# Patient Record
Sex: FEMALE | Race: WHITE | URBAN - METROPOLITAN AREA
[De-identification: names, ages, dates, MRNs, and addresses within clinical notes are randomized per-mention and may not be internally consistent; named-entity substitution may affect disease eponyms.]

---

## 2017-01-05 ENCOUNTER — CARE COORDINATION (OUTPATIENT)
Dept: CARDIOLOGY | Facility: CLINIC | Age: 72
End: 2017-01-05

## 2017-01-05 DIAGNOSIS — Z94.0 KIDNEY REPLACED BY TRANSPLANT: Primary | ICD-10-CM

## 2017-01-05 DIAGNOSIS — R79.89 ELEVATED SERUM CREATININE: ICD-10-CM

## 2017-01-05 NOTE — NURSING NOTE
Additional labs per Dr. Rosalba Cope, N  Nephrology  [facility]  [phone]

## 2017-01-06 ENCOUNTER — TELEPHONE (OUTPATIENT)
Dept: TRANSPLANT | Facility: CLINIC | Age: 72
End: 2017-01-06

## 2017-01-06 NOTE — TELEPHONE ENCOUNTER
Prior Authorization Specialty Medication Request    Medication/Dose: Mycophenolic acid 360 mg  Frequency: BID    Route: oral  Diagnosis and ICD: Kidney Transplant Z94.0  New/Renewal/Insurance Change PA:     Important Lab Values:     Previously Tried and Failed Therapies:     Rationale:     Would you like to include any research articles?    If yes please include the hyperlink(s) below or fax @ [phone].    (Include Name and MRN)    If you received a fax notification from an outside Pharmacy;  Pharmacy Name:[facility]  Pharmacy #:[phone]  Pharmacy Fax:[phone]

## 2017-01-10 ENCOUNTER — TELEPHONE (OUTPATIENT)
Dept: NEPHROLOGY | Facility: CLINIC | Age: 72
End: 2017-01-10

## 2017-01-10 NOTE — Clinical Note
January 10, 2017    OUTPATIENT LABORATORY TEST ORDER    Patient Name: Dixie Cunha               Transplant Date: 8/26/2003   YOB: 1945                              Issue Date & Time:1/10/2017  9:48 AM    [facility] MR: [id number]                               Exp. Date (1 year after date issued)    Diagnoses: Aftercare of Organ Transplant (ICD-10 Z48.288)   Kidney Transplant (ICD-10 Z94.0)   Long term use of medications (ICD-10 Z79.899)     Lab results to be available on the same day drawn.   Patient should release information to the [facility] Transplant [facility].  Please fax to the Transplant Center at [phone].    Every 3 months:   ?CBC and Platelet   ?Basic Metabolic Panel           ?12 hour Mycophenolic acid  level          ?12 hour tacrolimus drug level    Every 6 months:          ?Random Urine for protein/creatinine ratio          ?Liver Function Tests           ?Fasting Lipid Panel      If you have any questions, please call The Transplant Center at [phone] .    Please fax labs to [phone]    .

## 2017-01-10 NOTE — Clinical Note
OUTPATIENT LABORATORY TEST ORDER    Patient Name: Dixie Cunha               Transplant Date: 8/26/2003   YOB: 1945                              Issue Date & Time:1/10/2017  9:48 AM    [facility] MR: [id number]                               Exp. Date (1 year after date issued)    Diagnoses: Aftercare of Organ Transplant (ICD-10 Z48.288)   Kidney Transplant (ICD-10 Z94.0)   Long term use of medications (ICD-10 Z79.899)     Lab results to be available on the same day drawn.   Patient should release information to the [facility].  Please fax to the Transplant Center at [phone].    Every 3 months:   ?CBC and Platelet   ?Basic Metabolic Panel           ?12 hour Mycophenolic acid  level          ?12 hour tacrolimus drug level    Every 6 months:          ?Random Urine for protein/creatinine ratio          ?Liver Function Tests           ?Fasting Lipid Panel      If you have any questions, please call The Transplant Center at [phone] .    Please fax labs to [phone]    .

## 2017-01-11 DIAGNOSIS — Z48.298 AFTERCARE FOLLOWING ORGAN TRANSPLANT: ICD-10-CM

## 2017-01-11 DIAGNOSIS — Z94.0 KIDNEY REPLACED BY TRANSPLANT: ICD-10-CM

## 2017-01-11 DIAGNOSIS — Z79.899 ENCOUNTER FOR LONG-TERM CURRENT USE OF MEDICATION: ICD-10-CM

## 2017-01-11 PROCEDURE — 80197 ASSAY OF TACROLIMUS: CPT | Performed by: INTERNAL MEDICINE

## 2017-01-13 LAB
MYCOPHENOLATE SERPL LC/MS/MS-MCNC: NORMAL MG/L (ref 1–3.5)
MYCOPHENOLATE-G SERPL LC/MS/MS-MCNC: NORMAL MG/L (ref 30–95)
TACROLIMUS BLD-MCNC: 8.9 UG/L (ref 5–15)
TME LAST DOSE: NORMAL H
TME LAST DOSE: NORMAL H

## 2017-01-16 ENCOUNTER — DOCUMENTATION ONLY (OUTPATIENT)
Dept: TRANSPLANT | Facility: CLINIC | Age: 72
End: 2017-01-16

## 2017-01-16 ENCOUNTER — TELEPHONE (OUTPATIENT)
Dept: TRANSPLANT | Facility: CLINIC | Age: 72
End: 2017-01-16

## 2017-01-16 DIAGNOSIS — Z94.0 KIDNEY TRANSPLANTED: Primary | ICD-10-CM

## 2017-01-16 DIAGNOSIS — Z94.0 IMMUNOSUPPRESSIVE MANAGEMENT ENCOUNTER FOLLOWING KIDNEY TRANSPLANT: ICD-10-CM

## 2017-01-16 DIAGNOSIS — Z79.899 IMMUNOSUPPRESSIVE MANAGEMENT ENCOUNTER FOLLOWING KIDNEY TRANSPLANT: ICD-10-CM

## 2017-01-16 DIAGNOSIS — Z48.298 AFTERCARE FOLLOWING ORGAN TRANSPLANT: ICD-10-CM

## 2017-01-16 DIAGNOSIS — T86.10 COMPLICATIONS, KIDNEY TRANSPLANT: ICD-10-CM

## 2017-01-16 NOTE — TELEPHONE ENCOUNTER
ISSUE: Serum Potassium = 5.7    Recommend eating LESS of Potassium rich food.    No answer.  Left a voicemail message of which fruits and vegetables to avoid.

## 2017-01-16 NOTE — PROGRESS NOTES
Notes Recorded by Jean Pierre Navarrete MD on 1/13/2017 at 6:17 PM  Can you please make sure her labs are arranged for   She needs to check MPA level and tacrolimus in addition to renal panels    Added lab orders in Epic.  External lab orders already have MPA levels.

## 2017-01-17 ENCOUNTER — OFFICE VISIT (OUTPATIENT)
Dept: NEPHROLOGY | Facility: CLINIC | Age: 72
End: 2017-01-17
Attending: INTERNAL MEDICINE
Payer: COMMERCIAL

## 2017-01-17 VITALS
OXYGEN SATURATION: 94 % | HEIGHT: 64 IN | HEART RATE: 80 BPM | WEIGHT: 172.6 LBS | DIASTOLIC BLOOD PRESSURE: 91 MMHG | SYSTOLIC BLOOD PRESSURE: 160 MMHG | TEMPERATURE: 98.6 F | BODY MASS INDEX: 29.47 KG/M2

## 2017-01-17 DIAGNOSIS — Z94.0 KIDNEY REPLACED BY TRANSPLANT: ICD-10-CM

## 2017-01-17 DIAGNOSIS — Z48.298 AFTERCARE FOLLOWING ORGAN TRANSPLANT: ICD-10-CM

## 2017-01-17 DIAGNOSIS — E08.00 DIABETES MELLITUS DUE TO UNDERLYING CONDITION WITH HYPEROSMOLARITY WITHOUT COMA, WITH LONG-TERM CURRENT USE OF INSULIN (H): ICD-10-CM

## 2017-01-17 DIAGNOSIS — I15.1 HYPERTENSION SECONDARY TO OTHER RENAL DISORDERS: ICD-10-CM

## 2017-01-17 DIAGNOSIS — D84.9 IMMUNOSUPPRESSION (H): Primary | ICD-10-CM

## 2017-01-17 DIAGNOSIS — Z79.4 DIABETES MELLITUS DUE TO UNDERLYING CONDITION WITH HYPEROSMOLARITY WITHOUT COMA, WITH LONG-TERM CURRENT USE OF INSULIN (H): ICD-10-CM

## 2017-01-17 DIAGNOSIS — I25.10 CORONARY ARTERY DISEASE INVOLVING NATIVE CORONARY ARTERY OF NATIVE HEART WITHOUT ANGINA PECTORIS: ICD-10-CM

## 2017-01-17 PROCEDURE — 99213 OFFICE O/P EST LOW 20 MIN: CPT | Mod: ZF

## 2017-01-17 RX ORDER — TACROLIMUS 0.5 MG/1
0.5 CAPSULE ORAL 2 TIMES DAILY
Qty: 360 CAPSULE | Refills: 3 | Status: SHIPPED | OUTPATIENT
Start: 2017-01-17 | End: 2017-01-31

## 2017-01-17 ASSESSMENT — PAIN SCALES - GENERAL: PAINLEVEL: NO PAIN (0)

## 2017-01-17 NOTE — Clinical Note
1/17/2017       RE: Dixie Cunha  [address]  [place] [address]     Dear Colleague,    Thank you for referring your patient, Dixie Cunha, to the [facility] NEPHROLOGY at [facility]. Please see a copy of my visit note below.    No notes on file    Again, thank you for allowing me to participate in the care of your patient.      Sincerely,    Jean Pierre Navarrete MD

## 2017-01-17 NOTE — NURSING NOTE
"Chief Complaint   Patient presents with     RECHECK     FOLLOW UP       Initial /91 mmHg  Pulse 80  Temp(Src) 98.6  F (37  C) (Oral)  Ht 1.626 m (5' 4\")  Wt 78.291 kg (172 lb 9.6 oz)  BMI 29.61 kg/m2  SpO2 94% Estimated body mass index is 29.61 kg/(m^2) as calculated from the following:    Height as of this encounter: 1.626 m (5' 4\").    Weight as of this encounter: 78.291 kg (172 lb 9.6 oz).  BP completed using cuff size: dagoberto LAWSON CMA    "

## 2017-01-17 NOTE — Clinical Note
1/17/2017      RE: Dixie Cunha  [address]  [place] [address]       Assessment and Plan:  1. 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 Cr. Has improved significantly with the switch of rapamycin     2. Immunosuppression: Switched Rapamune to MMF will continue tacrolimus, no diarrhea thus far. I reduced the prograf dose to 0.5 mg po bid goal 4-6 ug/L     3. HTN/ volume overload: acceptable BP, weight is down to EDW and currently on diuretics PRN    4. CAD: LHC was done and showed obstructive CAD only amenable to medical intervention currently asymptomatic     5. DM: well control, Hg A1c in 6 range, following with endocrinology     6. Skin cancer: no recurrence since she was switch on Rapa, dermatology to f/u.     7. Dyslipidemia: continues with statin did not switch to crestor     Will plan to space out labs to monthly,     Assessment and plan was discussed with patient and she voiced her understanding and agreement.      Reason for Visit:  Ms. Cunha is here for kidney transplant follow up.     HPI:   Dixie Cunha is a 70 year old year old female with PMH significant for DM (well controled), complicated by CAD s/p GABG 2003, that failed ( as per patient her vein were to small), now with PHT, RHF, diabetic nephropathy s/p LDKT in 2003 from her sister, with baseline Cr was 1.4-1.5, until 8/12/16. She was found with a Cr of 3.5 during her routine f/u with transplant nephrology. Her immunosuppression has been Rapamune and Tacrolimus. She had a kidney Bx showing oxalate nephropathy, most likely secondary to infectious diarrhea ( 4-6 mo of diarrhea).  She was treated for Yersinia enteritis and her diarrhea resolved. Her Cr got better with resolving diarrhea but she had volume overload,with 15-20 pounds volume up after switch to sirolimus.   Her fluid over load was managed by  discontinuing the rapamycin and intensifying diuretics. She is currently off routine diuresis and only using it PRN   She will travel back to [place] this season          Transplant Hx:       Tx: LDKT  Date: 2003       Present Maintenance IS:Tac myfortic being tapered of mTOR     Baseline Creatinine: 1.4-1.5       Recent DSA: No         Biopsy: Yes: 9/16/16: oxalate nephropathy       Home BP: 140 /70 mmHg      ROS:   A comprehensive review of systems was obtained and negative, except as noted in the HPI or PMH.    Active Medical Problems:  Patient Active Problem List   Diagnosis     Diabetes (H)     Kidney replaced by transplant     CAD (coronary artery disease)     S/P CABG (coronary artery bypass graft)     Skin cancer     Secondary oxalosis (H)     Hypertension     Aftercare following organ transplant     Immunosuppression (H)       Personal Hx:  Social History     Social History     Marital Status:      Spouse Name: N/A     Number of Children: N/A     Years of Education: N/A     Occupational History     Not on file.     Social History Main Topics     Smoking status: Former Smoker     Smokeless tobacco: Not on file     Alcohol Use: Yes     Drug Use: No     Sexual Activity: Not on file     Other Topics Concern     Not on file     Social History Narrative       Allergies:  Allergies   Allergen Reactions     Gabapentin Swelling       Medications:  Prior to Admission medications    Medication Sig Start Date End Date Taking? Authorizing Provider   chlorthalidone (HYGROTON) 25 MG tablet Take 1 tablet (25 mg) by mouth daily 9/20/16  Yes Nydia Sosa MD   blood glucose monitoring (ONE TOUCH ULTRASOFT) lancets Dispense item covered by pt ins. E11.9 IDDM type II - Test 4 times/day. Reason: Insulin Pump 8/16/16  Yes Reported, Patient   metoclopramide (REGLAN) 10 MG tablet Take 10 mg by mouth 8/16/16  Yes Reported, Patient   acetaminophen (TYLENOL) 325 MG tablet Take 325 mg by mouth 10/3/14  Yes Reported,  "Patient   insulin lispro (HUMALOG) 100 UNIT/ML VIAL Inject 100 Units Subcutaneous 3 times daily (before meals)   Yes Reported, Patient   penicillin V (VEETID) 250 mg/5 mL suspension Take 160-800 mg by mouth 3 TAB BEFORE AND 3 TAB AFTER DENTAL PROCEDURE   Yes Reported, Patient   RAPAMUNE 0.5 MG PO TABLET Take 1 tablet (0.5 mg) by mouth daily Total dose = 1.5 mg daily  Patient taking differently: Take 1 mg by mouth daily Total dose = 1.5 mg daily 9/14/16  Yes Kareem White MD   PROGRAF 1 MG PO CAPSULE Take 1 capsule (1 mg) by mouth 2 times daily 8/29/16  Yes Kareem White MD   aspirin  MG tablet Take 325 mg by mouth 10/3/14  Yes Reported, Patient   glucagon (GLUCAGON EMERGENCY) 1 MG injection Inject 1 mg subcutaneous. As  idrected  Indications: HYPOGLYCEMIC DISORDER 10/3/14  Yes Reported, Patient   metoprolol (LOPRESSOR) 25 MG tablet Take 25 mg by mouth 11/10/14  Yes Reported, Patient   Omega-3 Fatty Acids (FISH OIL) 1000 MG CPDR Take  by mouth. One capsule twice daily.  Indications: Arteriosclerotic Heart Disease 10/3/14  Yes Reported, Patient   omeprazole (PRILOSEC) 20 MG capsule Take 20 mg by mouth 10/3/14  Yes Reported, Patient   blood glucose (ONE TOUCH ULTRA) test strip Use to test blood glucose 10 times daily 12/12/14  Yes Reported, Patient   simvastatin (ZOCOR) 20 MG tablet Take 20 mg by mouth 10/3/14  Yes Reported, Patient   sulfamethoxazole-trimethoprim (BACTRIM DS) 800-160 MG per tablet 1 tab q MWF for prophylaxis  Indications: PROPHYLAXIS 10/3/14  Yes Reported, Patient       Vitals:  /91 mmHg  Pulse 80  Temp(Src) 98.6  F (37  C) (Oral)  Ht 1.626 m (5' 4\")  Wt 78.291 kg (172 lb 9.6 oz)  BMI 29.61 kg/m2  SpO2 94%    Exam:   HENT: mouth without ulcers or lesions  LYMPHATICS: no cervical or supraclavicular nodes  RESP: lungs clear to auscultation - no rales, rhonchi or wheezes  CV: regular rhythm, normal rate, no rub, no murmur  EDEMA: minimal LE edema bilateral " (improved)  ABDOMEN: soft, nondistended, nontender, bowel sounds normal  MS: extremities normal - no gross deformities noted, no evidence of inflammation in joints, no muscle tenderness  SKIN: no rash    Results: labs reviewed,        Jea nPierre Navarrete MD

## 2017-01-18 NOTE — TELEPHONE ENCOUNTER
[facility] Prior Authorization Team   Phone: [phone]  Fax: [phone]      PA Initiation    Medication: MYFORTIC 360MG  Insurance Company: [employer] ([facility]) - Phone [phone] Fax [phone]  Pharmacy Filling the Rx: [id number] [place], [place] - [address]  Filling Pharmacy Phone:    Filling Pharmacy Fax:    Start Date: 1/17/2017

## 2017-01-20 DIAGNOSIS — R79.89 ELEVATED SERUM CREATININE: ICD-10-CM

## 2017-01-20 DIAGNOSIS — D84.9 IMMUNOSUPPRESSION (H): ICD-10-CM

## 2017-01-20 DIAGNOSIS — Z94.0 KIDNEY REPLACED BY TRANSPLANT: ICD-10-CM

## 2017-01-20 LAB
ALBUMIN SERPL-MCNC: 3.4 G/DL (ref 3.4–5)
ANION GAP SERPL CALCULATED.3IONS-SCNC: 7 MMOL/L (ref 3–14)
BUN SERPL-MCNC: 52 MG/DL (ref 7–30)
CALCIUM SERPL-MCNC: 8.3 MG/DL (ref 8.5–10.1)
CHLORIDE SERPL-SCNC: 103 MMOL/L (ref 94–109)
CO2 SERPL-SCNC: 30 MMOL/L (ref 20–32)
CREAT SERPL-MCNC: 2.28 MG/DL (ref 0.52–1.04)
FERRITIN SERPL-MCNC: 36 NG/ML (ref 8–252)
GFR SERPL CREATININE-BSD FRML MDRD: 21 ML/MIN/1.7M2
GLUCOSE SERPL-MCNC: 86 MG/DL (ref 70–99)
HGB BLD-MCNC: 10.3 G/DL (ref 11.7–15.7)
IRON SATN MFR SERPL: 12 % (ref 15–46)
IRON SERPL-MCNC: 46 UG/DL (ref 35–180)
PHOSPHATE SERPL-MCNC: 3.7 MG/DL (ref 2.5–4.5)
POTASSIUM SERPL-SCNC: 4.9 MMOL/L (ref 3.4–5.3)
SODIUM SERPL-SCNC: 140 MMOL/L (ref 133–144)
TACROLIMUS BLD-MCNC: 8.4 UG/L (ref 5–15)
TIBC SERPL-MCNC: 376 UG/DL (ref 240–430)
TME LAST DOSE: NORMAL H

## 2017-01-20 NOTE — TELEPHONE ENCOUNTER
Prior Authorization Approval    Authorization Effective Date: 1/18/2017  Authorization Expiration Date: 12/31/2017  Medication: MYFORTIC 360MG - Approved  Approved Dose/Quantity: BID  Reference #: CMM EPA KEY [id number]   Insurance Company: [employer] ([facility]) - Phone [phone] Fax [phone]  Expected CoPay:       CoPay Card Available:      Foundation Assistance Needed:    Which Pharmacy is filling the prescription (Not needed for infusion/clinic administered): [id number] [place] [address]  Pharmacy Notified:  Yes  Patient Notified:  Yes

## 2017-01-22 PROBLEM — D84.9 IMMUNOSUPPRESSION (H): Status: ACTIVE | Noted: 2017-01-22

## 2017-01-23 NOTE — PROGRESS NOTES
Assessment and Plan:  1. 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 Cr. Has improved significantly with the switch of rapamycin     2. Immunosuppression: Switched Rapamune to MMF will continue tacrolimus, no diarrhea thus far. I reduced the prograf dose to 0.5 mg po bid goal 4-6 ug/L     3. HTN/ volume overload: acceptable BP, weight is down to EDW and currently on diuretics PRN    4. CAD: LHC was done and showed obstructive CAD only amenable to medical intervention currently asymptomatic     5. DM: well control, Hg A1c in 6 range, following with endocrinology     6. Skin cancer: no recurrence since she was switch on Rapa, dermatology to f/u.     7. Dyslipidemia: continues with statin did not switch to crestor     Will plan to space out labs to monthly,     Assessment and plan was discussed with patient and she voiced her understanding and agreement.      Reason for Visit:  Ms. Cunha is here for kidney transplant follow up.     HPI:   Dixie Cunha is a 70 year old year old female with PMH significant for DM (well controled), complicated by CAD s/p GABG 2003, that failed ( as per patient her vein were to small), now with PHT, RHF, diabetic nephropathy s/p LDKT in 2003 from her sister, with baseline Cr was 1.4-1.5, until 8/12/16. She was found with a Cr of 3.5 during her routine f/u with transplant nephrology. Her immunosuppression has been Rapamune and Tacrolimus. She had a kidney Bx showing oxalate nephropathy, most likely secondary to infectious diarrhea ( 4-6 mo of diarrhea).  She was treated for Yersinia enteritis and her diarrhea resolved. Her Cr got better with resolving diarrhea but she had volume overload,with 15-20 pounds volume up after switch to sirolimus.   Her fluid over load was managed by discontinuing the rapamycin and intensifying diuretics. She is currently off routine diuresis  and only using it PRN   She will travel back to [place] this season          Transplant Hx:       Tx: LDKT  Date: 2003       Present Maintenance IS:Tac myfortic being tapered of mTOR     Baseline Creatinine: 1.4-1.5       Recent DSA: No         Biopsy: Yes: 9/16/16: oxalate nephropathy       Home BP: 140 /70 mmHg      ROS:   A comprehensive review of systems was obtained and negative, except as noted in the HPI or PMH.    Active Medical Problems:  Patient Active Problem List   Diagnosis     Diabetes (H)     Kidney replaced by transplant     CAD (coronary artery disease)     S/P CABG (coronary artery bypass graft)     Skin cancer     Secondary oxalosis (H)     Hypertension     Aftercare following organ transplant     Immunosuppression (H)       Personal Hx:  Social History     Social History     Marital Status:      Spouse Name: N/A     Number of Children: N/A     Years of Education: N/A     Occupational History     Not on file.     Social History Main Topics     Smoking status: Former Smoker     Smokeless tobacco: Not on file     Alcohol Use: Yes     Drug Use: No     Sexual Activity: Not on file     Other Topics Concern     Not on file     Social History Narrative       Allergies:  Allergies   Allergen Reactions     Gabapentin Swelling       Medications:  Prior to Admission medications    Medication Sig Start Date End Date Taking? Authorizing Provider   chlorthalidone (HYGROTON) 25 MG tablet Take 1 tablet (25 mg) by mouth daily 9/20/16  Yes Nydia Sosa MD   blood glucose monitoring (ONE TOUCH ULTRASOFT) lancets Dispense item covered by pt ins. E11.9 IDDM type II - Test 4 times/day. Reason: Insulin Pump 8/16/16  Yes Reported, Patient   metoclopramide (REGLAN) 10 MG tablet Take 10 mg by mouth 8/16/16  Yes Reported, Patient   acetaminophen (TYLENOL) 325 MG tablet Take 325 mg by mouth 10/3/14  Yes Reported, Patient   insulin lispro (HUMALOG) 100 UNIT/ML VIAL Inject 100 Units Subcutaneous 3 times daily  "(before meals)   Yes Reported, Patient   penicillin V (VEETID) 250 mg/5 mL suspension Take 160-800 mg by mouth 3 TAB BEFORE AND 3 TAB AFTER DENTAL PROCEDURE   Yes Reported, Patient   RAPAMUNE 0.5 MG PO TABLET Take 1 tablet (0.5 mg) by mouth daily Total dose = 1.5 mg daily  Patient taking differently: Take 1 mg by mouth daily Total dose = 1.5 mg daily 9/14/16  Yes Kareem White MD   PROGRAF 1 MG PO CAPSULE Take 1 capsule (1 mg) by mouth 2 times daily 8/29/16  Yes Kareem White MD   aspirin  MG tablet Take 325 mg by mouth 10/3/14  Yes Reported, Patient   glucagon (GLUCAGON EMERGENCY) 1 MG injection Inject 1 mg subcutaneous. As  idrected  Indications: HYPOGLYCEMIC DISORDER 10/3/14  Yes Reported, Patient   metoprolol (LOPRESSOR) 25 MG tablet Take 25 mg by mouth 11/10/14  Yes Reported, Patient   Omega-3 Fatty Acids (FISH OIL) 1000 MG CPDR Take  by mouth. One capsule twice daily.  Indications: Arteriosclerotic Heart Disease 10/3/14  Yes Reported, Patient   omeprazole (PRILOSEC) 20 MG capsule Take 20 mg by mouth 10/3/14  Yes Reported, Patient   blood glucose (ONE TOUCH ULTRA) test strip Use to test blood glucose 10 times daily 12/12/14  Yes Reported, Patient   simvastatin (ZOCOR) 20 MG tablet Take 20 mg by mouth 10/3/14  Yes Reported, Patient   sulfamethoxazole-trimethoprim (BACTRIM DS) 800-160 MG per tablet 1 tab q MWF for prophylaxis  Indications: PROPHYLAXIS 10/3/14  Yes Reported, Patient       Vitals:  /91 mmHg  Pulse 80  Temp(Src) 98.6  F (37  C) (Oral)  Ht 1.626 m (5' 4\")  Wt 78.291 kg (172 lb 9.6 oz)  BMI 29.61 kg/m2  SpO2 94%    Exam:   HENT: mouth without ulcers or lesions  LYMPHATICS: no cervical or supraclavicular nodes  RESP: lungs clear to auscultation - no rales, rhonchi or wheezes  CV: regular rhythm, normal rate, no rub, no murmur  EDEMA: minimal LE edema bilateral (improved)  ABDOMEN: soft, nondistended, nontender, bowel sounds normal  MS: extremities normal - no gross " deformities noted, no evidence of inflammation in joints, no muscle tenderness  SKIN: no rash    Results: labs reviewed,

## 2017-01-24 LAB
MYCOPHENOLATE SERPL LC/MS/MS-MCNC: 2.97 MG/L (ref 1–3.5)
MYCOPHENOLATE-G SERPL LC/MS/MS-MCNC: 121.5 MG/L (ref 30–95)
TME LAST DOSE: ABNORMAL H

## 2017-01-31 DIAGNOSIS — Z94.0 KIDNEY REPLACED BY TRANSPLANT: ICD-10-CM

## 2017-01-31 DIAGNOSIS — D84.9 IMMUNOSUPPRESSION (H): Primary | ICD-10-CM

## 2017-01-31 RX ORDER — TACROLIMUS 0.5 MG/1
0.5 CAPSULE ORAL 2 TIMES DAILY
Qty: 60 CAPSULE | Refills: 11 | Status: SHIPPED | OUTPATIENT
Start: 2017-01-31 | End: 2017-04-27

## 2017-01-31 RX ORDER — MYCOPHENOLIC ACID 360 MG/1
360 TABLET, DELAYED RELEASE ORAL 2 TIMES DAILY
Qty: 60 TABLET | Refills: 11 | Status: SHIPPED | OUTPATIENT
Start: 2017-01-31 | End: 2017-06-01

## 2017-03-02 DIAGNOSIS — T86.10 COMPLICATIONS, KIDNEY TRANSPLANT: ICD-10-CM

## 2017-03-02 DIAGNOSIS — Z94.0 KIDNEY TRANSPLANTED: ICD-10-CM

## 2017-03-02 RX ORDER — CALCIUM ACETATE 667 MG/1
667 CAPSULE ORAL
Qty: 270 CAPSULE | Refills: 3 | Status: SHIPPED | OUTPATIENT
Start: 2017-03-02 | End: 2018-06-06

## 2017-03-02 NOTE — TELEPHONE ENCOUNTER
Last Office Visit with Nephrologist:  1/17/17.  Medication refilled per Nephrology Clinic protocol.     Debbie Olsen RN

## 2017-03-30 ENCOUNTER — CARE COORDINATION (OUTPATIENT)
Dept: NEPHROLOGY | Facility: CLINIC | Age: 72
End: 2017-03-30

## 2017-03-30 NOTE — PROGRESS NOTES
Reason for Call    Followed up with patient on uremic symptoms. She's currently in [place], said she's feeling well overall. Notes ongoing fatigue, which isn't new or changed. Weight is down to 155lbs. Had one bout of diarrhea, which was treated locally. Said she's not great at checking BP consistently. No other symptoms or concerns at this time for nephrology.    Patient Education    1. Uremic symptoms and when to call clinic    Plan    1. Follow up with Dr. Navarrete on 5/16/17    Patient was given an opportunity to ask questions and have those questions answered to her satisfaction.  Patient verbalized understanding of instructions provided and agreed to plan of care.    Debbie Olsen RN

## 2017-04-26 DIAGNOSIS — D84.9 IMMUNOSUPPRESSION (H): ICD-10-CM

## 2017-04-27 RX ORDER — TACROLIMUS 0.5 MG/1
0.5 CAPSULE ORAL 2 TIMES DAILY
Qty: 60 CAPSULE | Refills: 11 | Status: SHIPPED | OUTPATIENT
Start: 2017-04-27 | End: 2017-07-21

## 2017-05-13 ENCOUNTER — HOSPITAL ENCOUNTER (OUTPATIENT)
Facility: CLINIC | Age: 72
Setting detail: SPECIMEN
Discharge: HOME | End: 2017-05-13
Attending: INTERNAL MEDICINE | Admitting: INTERNAL MEDICINE
Payer: COMMERCIAL

## 2017-05-13 PROCEDURE — 80197 ASSAY OF TACROLIMUS: CPT | Performed by: INTERNAL MEDICINE

## 2017-05-16 ENCOUNTER — OFFICE VISIT (OUTPATIENT)
Dept: NEPHROLOGY | Facility: CLINIC | Age: 72
End: 2017-05-16
Attending: INTERNAL MEDICINE
Payer: COMMERCIAL

## 2017-05-16 VITALS
TEMPERATURE: 98.4 F | OXYGEN SATURATION: 96 % | BODY MASS INDEX: 27.96 KG/M2 | DIASTOLIC BLOOD PRESSURE: 62 MMHG | HEIGHT: 64 IN | WEIGHT: 163.8 LBS | HEART RATE: 75 BPM | SYSTOLIC BLOOD PRESSURE: 128 MMHG

## 2017-05-16 DIAGNOSIS — R82.992: ICD-10-CM

## 2017-05-16 DIAGNOSIS — E08.00 DIABETES MELLITUS DUE TO UNDERLYING CONDITION WITH HYPEROSMOLARITY WITHOUT COMA, WITH LONG-TERM CURRENT USE OF INSULIN (H): ICD-10-CM

## 2017-05-16 DIAGNOSIS — D84.9 IMMUNOSUPPRESSION (H): ICD-10-CM

## 2017-05-16 DIAGNOSIS — Z48.298 AFTERCARE FOLLOWING ORGAN TRANSPLANT: Primary | ICD-10-CM

## 2017-05-16 DIAGNOSIS — I15.1 HYPERTENSION SECONDARY TO OTHER RENAL DISORDERS: ICD-10-CM

## 2017-05-16 DIAGNOSIS — Z79.4 DIABETES MELLITUS DUE TO UNDERLYING CONDITION WITH HYPEROSMOLARITY WITHOUT COMA, WITH LONG-TERM CURRENT USE OF INSULIN (H): ICD-10-CM

## 2017-05-16 PROCEDURE — 99212 OFFICE O/P EST SF 10 MIN: CPT | Mod: ZF

## 2017-05-16 ASSESSMENT — PAIN SCALES - GENERAL: PAINLEVEL: NO PAIN (0)

## 2017-05-16 NOTE — MR AVS SNAPSHOT
After Visit Summary   5/16/2017    Dixie Cunha    MRN: [id number]           Patient Information     Date Of Birth          1945        Visit Information        Provider Department      5/16/2017 1:10 PM Jean Pierre Navarrete MD [facility]         Follow-ups after your visit        Your next 10 appointments already scheduled     Aug 14, 2017  2:00 PM CDT   (Arrive by 1:30 PM)   Return Kidney Transplant with Jean Pierre Navarrete MD   [facility] ([facility] [place] [facility] [place])    [address] [place] [id number] [phone]              Who to contact     If you have questions or need follow up information about today's clinic visit or your schedule please contact [facility] directly at [phone].  Normal or non-critical lab and imaging results will be communicated to you by Applausehart, letter or phone within 4 business days after the clinic has received the results. If you do not hear from us within 7 days, please contact the clinic through Applausehart or phone. If you have a critical or abnormal lab result, we will notify you by phone as soon as possible.  Submit refill requests through [email or web address] or call your pharmacy and they will forward the refill request to us. Please allow 3 business days for your refill to be completed.          Additional Information About Your Visit        Applausehart Information     [email or web address] gives you secure access to your electronic health record. If you see a primary care provider, you can also send messages to your care team and make appointments. If you have questions, please call your primary care clinic.  If you do not have a primary care provider, please call [phone] and they will assist you.        Care EveryWhere ID     This is your Care EveryWhere ID. This could be used by other organizations to access your [facility] medical records  [id number]        Your Vitals Were     Pulse Temperature Height Pulse  "Oximetry BMI (Body Mass Index)       75 98.4  F (36.9  C) (Oral) 1.626 m (5' 4\") 96% 28.12 kg/m2        Blood Pressure from Last 3 Encounters:   05/16/17 128/62   01/17/17 (!) 160/91   12/20/16 150/75    Weight from Last 3 Encounters:   05/16/17 74.3 kg (163 lb 12.8 oz)   01/17/17 78.3 kg (172 lb 9.6 oz)   12/20/16 77.6 kg (171 lb)              Today, you had the following     No orders found for display         Today's Medication Changes          These changes are accurate as of: 5/16/17  1:29 PM.  If you have any questions, ask your nurse or doctor.               These medicines have changed or have updated prescriptions.        Dose/Directions    bumetanide 2 MG tablet   Commonly known as:  BUMEX   This may have changed:  See the new instructions.   Used for:  Edema        TAKE 1 TABLET BY MOUTH TWICE A DAY   Quantity:  60 tablet   Refills:  2                Primary Care Provider Office Phone # Fax #    Tano Koch [phone]       [facility] [address] [place] [id number]        Thank you!     Thank you for choosing [facility]  for your care. Our goal is always to provide you with excellent care. Hearing back from our patients is one way we can continue to improve our services. Please take a few minutes to complete the written survey that you may receive in the mail after your visit with us. Thank you!             Your Updated Medication List - Protect others around you: Learn how to safely use, store and throw away your medicines at www.disposemymeds.org.          This list is accurate as of: 5/16/17  1:29 PM.  Always use your most recent med list.                   Brand Name Dispense Instructions for use    acetaminophen 325 MG tablet    TYLENOL     Take 325 mg by mouth as needed       ASPIRIN PO      Take 81 mg by mouth daily       blood glucose monitoring lancets      Dispense item covered by pt ins. E11.9 IDDM type II - Test 4 times/day. Reason: " Insulin Pump       bumetanide 2 MG tablet    BUMEX    60 tablet    TAKE 1 TABLET BY MOUTH TWICE A DAY       Calcium Acetate (Phos Binder) 667 MG Caps     270 capsule    Take 667 mg by mouth 3 times daily (with meals)       Fish Oil 1000 MG Cpdr      Take  by mouth. One capsule twice daily.  Indications: Arteriosclerotic Heart Disease       FLUZONE HIGH-DOSE 0.5 ML injection   Generic drug:  influenza Vac Split High-Dose      TO BE ADMINISTERED BY PHARMACIST FOR IMMUNIZATION       GLUCAGON EMERGENCY 1 MG kit   Generic drug:  glucagon      Inject 1 mg subcutaneous. As  idrected  Indications: HYPOGLYCEMIC DISORDER       insulin lispro 100 UNIT/ML injection    HumaLOG     Inject 100 Units Subcutaneous 3 times daily (before meals)       KAYEXALATE Powd     453 g    Take 30 mls by mouth, Mix with water to equal 30 ml. Only take if Potassium level >5.5       magnesium oxide 400 (241.3 MG) MG tablet    MAG-OX    60 tablet    Take 1 tablet (400 mg) by mouth daily       metoprolol 25 MG tablet    LOPRESSOR    180 tablet    Take 1 tablet (25 mg) by mouth 2 times daily       mycophenolic acid EC tablet     60 tablet    Take 1 tablet (360 mg) by mouth 2 times daily       nitroglycerin 0.4 MG sublingual tablet    NITROSTAT     Place 0.4 mg under the tongue       omeprazole 20 MG CR capsule    priLOSEC     Take 20 mg by mouth       ONE TOUCH ULTRA test strip   Generic drug:  blood glucose monitoring      Use to test blood glucose 10 times daily       penicillin V 250 mg/5 mL suspension    VEETID     Take 160-800 mg by mouth 3 TAB BEFORE AND 3 TAB AFTER DENTAL PROCEDURE       REGLAN 10 MG tablet   Generic drug:  metoclopramide      Take 10 mg by mouth as needed       sulfamethoxazole-trimethoprim 800-160 MG per tablet    BACTRIM DS     1 tab q MWF for prophylaxis  Indications: PROPHYLAXIS       tacrolimus 0.5 MG capsule    PROGRAF - GENERIC EQUIVALENT    60 capsule    Take 1 capsule (0.5 mg) by mouth 2 times daily

## 2017-05-16 NOTE — NURSING NOTE
Chief Complaint   Patient presents with     RECHECK     Post Kidney TXP   Pt roomed, vitals, meds, and allergies reviewed with pt. Pt ready for provider.  Morgan Gonzalez, CMA

## 2017-05-16 NOTE — LETTER
5/16/2017      RE: Dixie Cunha  [address]  [facility] [id number]       Assessment and Plan:  1. LDKT - 2003, new baseline 2-2.5 mg/dL   2. Immunosuppression:  MMF and prograf goal 4-6 ug/L     3. HTN: acceptable BP,  Now euvolemic     4. CAD: LHC was done and showed obstructive CAD only amenable to medical intervention currently asymptomatic     5. DM: well control, Hg A1c in 6 range, following with endocrinology     6. Skin cancer: no recurrence since she was switch on Rapa, dermatology to f/u.     7. Dyslipidemia: continues with statin did not switch to crestor     Will continue with monthly labs      Assessment and plan was discussed with patient and she voiced her understanding and agreement.      Reason for Visit:  Ms. Cunha is here for kidney transplant follow up.     HPI:   Dixie Cunha is a 71 year old year old female with PMH significant for DM (well controled), complicated by CAD s/p GABG 2003, that failed ( as per patient her vein were to small), now with PHT, RHF, diabetic nephropathy s/p LDKT in 2003 from her sister, with baseline Cr was 1.4-1.5, until 8/12/16. She was found with a Cr of 3.5 during her routine f/u with transplant nephrology. Her immunosuppression had been Rapamune and Tacrolimus. 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     Overall ding better. She had just returned from [place].           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    Active Medical Problems:  Patient Active Problem List   Diagnosis     Diabetes (H)     Kidney replaced by transplant     CAD (coronary artery disease)     S/P CABG  (coronary artery bypass graft)     Skin cancer     Secondary oxalosis (H)     Hypertension     Aftercare following organ transplant     Immunosuppression (H)       Personal Hx:  Social History     Social History     Marital status:      Spouse name: N/A     Number of children: N/A     Years of education: N/A     Occupational History     Not on file.     Social History Main Topics     Smoking status: Former Smoker     Smokeless tobacco: Not on file     Alcohol use Yes     Drug use: No     Sexual activity: Not on file     Other Topics Concern     Not on file     Social History Narrative       Allergies:  Allergies   Allergen Reactions     Gabapentin Swelling       Medications:  Prior to Admission medications    Medication Sig Start Date End Date Taking? Authorizing Provider   chlorthalidone (HYGROTON) 25 MG tablet Take 1 tablet (25 mg) by mouth daily 9/20/16  Yes Nydia Sosa MD   blood glucose monitoring (ONE TOUCH ULTRASOFT) lancets Dispense item covered by pt ins. E11.9 IDDM type II - Test 4 times/day. Reason: Insulin Pump 8/16/16  Yes Reported, Patient   metoclopramide (REGLAN) 10 MG tablet Take 10 mg by mouth 8/16/16  Yes Reported, Patient   acetaminophen (TYLENOL) 325 MG tablet Take 325 mg by mouth 10/3/14  Yes Reported, Patient   insulin lispro (HUMALOG) 100 UNIT/ML VIAL Inject 100 Units Subcutaneous 3 times daily (before meals)   Yes Reported, Patient   penicillin V (VEETID) 250 mg/5 mL suspension Take 160-800 mg by mouth 3 TAB BEFORE AND 3 TAB AFTER DENTAL PROCEDURE   Yes Reported, Patient   RAPAMUNE 0.5 MG PO TABLET Take 1 tablet (0.5 mg) by mouth daily Total dose = 1.5 mg daily  Patient taking differently: Take 1 mg by mouth daily Total dose = 1.5 mg daily 9/14/16  Yes Kareem White MD   PROGRAF 1 MG PO CAPSULE Take 1 capsule (1 mg) by mouth 2 times daily 8/29/16  Yes Kareem White MD   aspirin  MG tablet Take 325 mg by mouth 10/3/14  Yes Reported, Patient   glucagon  "(GLUCAGON EMERGENCY) 1 MG injection Inject 1 mg subcutaneous. As  idrected  Indications: HYPOGLYCEMIC DISORDER 10/3/14  Yes Reported, Patient   metoprolol (LOPRESSOR) 25 MG tablet Take 25 mg by mouth 11/10/14  Yes Reported, Patient   Omega-3 Fatty Acids (FISH OIL) 1000 MG CPDR Take  by mouth. One capsule twice daily.  Indications: Arteriosclerotic Heart Disease 10/3/14  Yes Reported, Patient   omeprazole (PRILOSEC) 20 MG capsule Take 20 mg by mouth 10/3/14  Yes Reported, Patient   blood glucose (ONE TOUCH ULTRA) test strip Use to test blood glucose 10 times daily 12/12/14  Yes Reported, Patient   simvastatin (ZOCOR) 20 MG tablet Take 20 mg by mouth 10/3/14  Yes Reported, Patient   sulfamethoxazole-trimethoprim (BACTRIM DS) 800-160 MG per tablet 1 tab q MWF for prophylaxis  Indications: PROPHYLAXIS 10/3/14  Yes Reported, Patient       Vitals:  /62  Pulse 75  Temp 98.4  F (36.9  C) (Oral)  Ht 1.626 m (5' 4\")  Wt 74.3 kg (163 lb 12.8 oz)  SpO2 96%  BMI 28.12 kg/m2    Exam:   HENT: mouth without ulcers or lesions  LYMPHATICS: no cervical or supraclavicular nodes  RESP: lungs clear to auscultation - no rales, rhonchi or wheezes  CV: regular rhythm, normal rate, no rub, no murmur  EDEMA: no LE edema bilateral  ABDOMEN: soft, nondistended, nontender, bowel sounds normal  MS: extremities normal - no gross deformities noted, no evidence of inflammation in joints, no muscle tenderness  SKIN: no rash    Results: labs reviewed,          Jean Pierre Navarrete MD      "

## 2017-05-30 NOTE — PROGRESS NOTES
Assessment and Plan:  1. LDKT - 2003, new baseline 2-2.5 mg/dL   2. Immunosuppression:  MMF and prograf goal 4-6 ug/L     3. HTN: acceptable BP,  Now euvolemic     4. CAD: LHC was done and showed obstructive CAD only amenable to medical intervention currently asymptomatic     5. DM: well control, Hg A1c in 6 range, following with endocrinology     6. Skin cancer: no recurrence since she was switch on Rapa, dermatology to f/u.     7. Dyslipidemia: continues with statin did not switch to crestor     Will continue with monthly labs      Assessment and plan was discussed with patient and she voiced her understanding and agreement.      Reason for Visit:  Ms. Cunha is here for kidney transplant follow up.     HPI:   Dixie Cunha is a 71 year old year old female with PMH significant for DM (well controled), complicated by CAD s/p GABG 2003, that failed ( as per patient her vein were to small), now with PHT, RHF, diabetic nephropathy s/p LDKT in 2003 from her sister, with baseline Cr was 1.4-1.5, until 8/12/16. She was found with a Cr of 3.5 during her routine f/u with transplant nephrology. Her immunosuppression had been Rapamune and Tacrolimus. 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     Overall ding better. She had just returned from [place].           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    Active Medical Problems:  Patient Active Problem List   Diagnosis     Diabetes (H)     Kidney replaced by transplant     CAD (coronary artery disease)     S/P CABG (coronary artery bypass graft)     Skin cancer     Secondary oxalosis (H)      Hypertension     Aftercare following organ transplant     Immunosuppression (H)       Personal Hx:  Social History     Social History     Marital status:      Spouse name: N/A     Number of children: N/A     Years of education: N/A     Occupational History     Not on file.     Social History Main Topics     Smoking status: Former Smoker     Smokeless tobacco: Not on file     Alcohol use Yes     Drug use: No     Sexual activity: Not on file     Other Topics Concern     Not on file     Social History Narrative       Allergies:  Allergies   Allergen Reactions     Gabapentin Swelling       Medications:  Prior to Admission medications    Medication Sig Start Date End Date Taking? Authorizing Provider   chlorthalidone (HYGROTON) 25 MG tablet Take 1 tablet (25 mg) by mouth daily 9/20/16  Yes Nydia Sosa MD   blood glucose monitoring (ONE TOUCH ULTRASOFT) lancets Dispense item covered by pt ins. E11.9 IDDM type II - Test 4 times/day. Reason: Insulin Pump 8/16/16  Yes Reported, Patient   metoclopramide (REGLAN) 10 MG tablet Take 10 mg by mouth 8/16/16  Yes Reported, Patient   acetaminophen (TYLENOL) 325 MG tablet Take 325 mg by mouth 10/3/14  Yes Reported, Patient   insulin lispro (HUMALOG) 100 UNIT/ML VIAL Inject 100 Units Subcutaneous 3 times daily (before meals)   Yes Reported, Patient   penicillin V (VEETID) 250 mg/5 mL suspension Take 160-800 mg by mouth 3 TAB BEFORE AND 3 TAB AFTER DENTAL PROCEDURE   Yes Reported, Patient   RAPAMUNE 0.5 MG PO TABLET Take 1 tablet (0.5 mg) by mouth daily Total dose = 1.5 mg daily  Patient taking differently: Take 1 mg by mouth daily Total dose = 1.5 mg daily 9/14/16  Yes Kareem White MD   PROGRAF 1 MG PO CAPSULE Take 1 capsule (1 mg) by mouth 2 times daily 8/29/16  Yes Kareem White MD   aspirin  MG tablet Take 325 mg by mouth 10/3/14  Yes Reported, Patient   glucagon (GLUCAGON EMERGENCY) 1 MG injection Inject 1 mg subcutaneous. As  idrected   "Indications: HYPOGLYCEMIC DISORDER 10/3/14  Yes Reported, Patient   metoprolol (LOPRESSOR) 25 MG tablet Take 25 mg by mouth 11/10/14  Yes Reported, Patient   Omega-3 Fatty Acids (FISH OIL) 1000 MG CPDR Take  by mouth. One capsule twice daily.  Indications: Arteriosclerotic Heart Disease 10/3/14  Yes Reported, Patient   omeprazole (PRILOSEC) 20 MG capsule Take 20 mg by mouth 10/3/14  Yes Reported, Patient   blood glucose (ONE TOUCH ULTRA) test strip Use to test blood glucose 10 times daily 12/12/14  Yes Reported, Patient   simvastatin (ZOCOR) 20 MG tablet Take 20 mg by mouth 10/3/14  Yes Reported, Patient   sulfamethoxazole-trimethoprim (BACTRIM DS) 800-160 MG per tablet 1 tab q MWF for prophylaxis  Indications: PROPHYLAXIS 10/3/14  Yes Reported, Patient       Vitals:  /62  Pulse 75  Temp 98.4  F (36.9  C) (Oral)  Ht 1.626 m (5' 4\")  Wt 74.3 kg (163 lb 12.8 oz)  SpO2 96%  BMI 28.12 kg/m2    Exam:   HENT: mouth without ulcers or lesions  LYMPHATICS: no cervical or supraclavicular nodes  RESP: lungs clear to auscultation - no rales, rhonchi or wheezes  CV: regular rhythm, normal rate, no rub, no murmur  EDEMA: no LE edema bilateral  ABDOMEN: soft, nondistended, nontender, bowel sounds normal  MS: extremities normal - no gross deformities noted, no evidence of inflammation in joints, no muscle tenderness  SKIN: no rash    Results: labs reviewed,        "

## 2017-06-01 DIAGNOSIS — Z94.0 KIDNEY REPLACED BY TRANSPLANT: Primary | ICD-10-CM

## 2017-06-01 RX ORDER — MYCOPHENOLIC ACID 360 MG/1
360 TABLET, DELAYED RELEASE ORAL 2 TIMES DAILY
Qty: 60 TABLET | Refills: 11 | Status: SHIPPED | OUTPATIENT
Start: 2017-06-01 | End: 2018-01-29

## 2017-06-03 DIAGNOSIS — R60.9 EDEMA: ICD-10-CM

## 2017-06-05 RX ORDER — BUMETANIDE 2 MG/1
TABLET ORAL
Qty: 60 TABLET | Refills: 11 | Status: SHIPPED | OUTPATIENT
Start: 2017-06-05 | End: 2017-08-14

## 2017-06-05 NOTE — TELEPHONE ENCOUNTER
Last Office Visit with Nephrologist:  5/16/17.  Medication refilled per Nephrology Clinic protocol.     Debbie Olsen RN

## 2017-07-19 DIAGNOSIS — Z79.899 IMMUNOSUPPRESSIVE MANAGEMENT ENCOUNTER FOLLOWING KIDNEY TRANSPLANT: ICD-10-CM

## 2017-07-19 DIAGNOSIS — Z94.0 IMMUNOSUPPRESSIVE MANAGEMENT ENCOUNTER FOLLOWING KIDNEY TRANSPLANT: ICD-10-CM

## 2017-07-19 DIAGNOSIS — Z94.0 KIDNEY TRANSPLANTED: ICD-10-CM

## 2017-07-19 DIAGNOSIS — T86.10 COMPLICATIONS, KIDNEY TRANSPLANT: ICD-10-CM

## 2017-07-19 DIAGNOSIS — Z48.298 AFTERCARE FOLLOWING ORGAN TRANSPLANT: ICD-10-CM

## 2017-07-19 PROCEDURE — 80197 ASSAY OF TACROLIMUS: CPT | Performed by: INTERNAL MEDICINE

## 2017-07-19 PROCEDURE — 80180 DRUG SCRN QUAN MYCOPHENOLATE: CPT | Performed by: INTERNAL MEDICINE

## 2017-07-21 ENCOUNTER — TELEPHONE (OUTPATIENT)
Dept: TRANSPLANT | Facility: CLINIC | Age: 72
End: 2017-07-21

## 2017-07-21 DIAGNOSIS — D84.9 IMMUNOSUPPRESSION (H): ICD-10-CM

## 2017-07-21 LAB
TACROLIMUS BLD-MCNC: ABNORMAL UG/L (ref 5–15)
TME LAST DOSE: ABNORMAL H

## 2017-07-21 RX ORDER — TACROLIMUS 0.5 MG/1
1 CAPSULE ORAL 2 TIMES DAILY
Qty: 120 CAPSULE | Refills: 11 | Status: SHIPPED | OUTPATIENT
Start: 2017-07-21 | End: 2017-09-28

## 2017-07-21 NOTE — TELEPHONE ENCOUNTER
Tacrolimus level <3.0 on dose of 0.5 mg twice a day.  Goal tacrolimus level 3-5.  Creatinine 2.25  Also on Myfortic 360 mg twice a day  14 years post kidney transplant    PLAN:   Increase tacrolimus dose to 1 mg twice a day  Check tacrolimus level with next labs.

## 2017-07-23 LAB
MYCOPHENOLATE SERPL LC/MS/MS-MCNC: 1.99 MG/L (ref 1–3.5)
MYCOPHENOLATE-G SERPL LC/MS/MS-MCNC: 125 MG/L (ref 30–95)
TME LAST DOSE: ABNORMAL H

## 2017-08-09 DIAGNOSIS — Z94.0 KIDNEY TRANSPLANTED: ICD-10-CM

## 2017-08-09 DIAGNOSIS — Z79.899 IMMUNOSUPPRESSIVE MANAGEMENT ENCOUNTER FOLLOWING KIDNEY TRANSPLANT: ICD-10-CM

## 2017-08-09 DIAGNOSIS — T86.10 COMPLICATIONS, KIDNEY TRANSPLANT: ICD-10-CM

## 2017-08-09 DIAGNOSIS — Z48.298 AFTERCARE FOLLOWING ORGAN TRANSPLANT: ICD-10-CM

## 2017-08-09 DIAGNOSIS — Z94.0 IMMUNOSUPPRESSIVE MANAGEMENT ENCOUNTER FOLLOWING KIDNEY TRANSPLANT: ICD-10-CM

## 2017-08-09 PROCEDURE — 80197 ASSAY OF TACROLIMUS: CPT | Performed by: INTERNAL MEDICINE

## 2017-08-09 PROCEDURE — 80180 DRUG SCRN QUAN MYCOPHENOLATE: CPT | Performed by: INTERNAL MEDICINE

## 2017-08-11 LAB
TACROLIMUS BLD-MCNC: 5.5 UG/L (ref 5–15)
TME LAST DOSE: NORMAL H

## 2017-08-14 ENCOUNTER — OFFICE VISIT (OUTPATIENT)
Dept: NEPHROLOGY | Facility: CLINIC | Age: 72
End: 2017-08-14
Attending: INTERNAL MEDICINE
Payer: COMMERCIAL

## 2017-08-14 VITALS
HEART RATE: 90 BPM | HEIGHT: 64 IN | WEIGHT: 164.6 LBS | TEMPERATURE: 98.3 F | OXYGEN SATURATION: 98 % | DIASTOLIC BLOOD PRESSURE: 74 MMHG | BODY MASS INDEX: 28.1 KG/M2 | SYSTOLIC BLOOD PRESSURE: 129 MMHG

## 2017-08-14 DIAGNOSIS — R60.9 EDEMA, UNSPECIFIED TYPE: ICD-10-CM

## 2017-08-14 DIAGNOSIS — Z94.0 KIDNEY TRANSPLANTED: ICD-10-CM

## 2017-08-14 DIAGNOSIS — Z48.298 AFTERCARE FOLLOWING ORGAN TRANSPLANT: ICD-10-CM

## 2017-08-14 DIAGNOSIS — N25.0 RENAL OSTEODYSTROPHY: ICD-10-CM

## 2017-08-14 DIAGNOSIS — R53.83 FATIGUE, UNSPECIFIED TYPE: Primary | ICD-10-CM

## 2017-08-14 DIAGNOSIS — T86.10 COMPLICATION OF TRANSPLANTED KIDNEY, UNSPECIFIED COMPLICATION: ICD-10-CM

## 2017-08-14 PROCEDURE — 99212 OFFICE O/P EST SF 10 MIN: CPT | Mod: ZF

## 2017-08-14 RX ORDER — BUMETANIDE 0.5 MG/1
0.5 TABLET ORAL DAILY
Qty: 90 TABLET | Refills: 3 | Status: SHIPPED | OUTPATIENT
Start: 2017-08-14 | End: 2017-09-18

## 2017-08-14 NOTE — MR AVS SNAPSHOT
After Visit Summary   8/14/2017    Dixie Cunha    MRN: [id number]           Patient Information     Date Of Birth          1945        Visit Information        Provider Department      8/14/2017 2:00 PM Jean Pierre Navarrete MD [facility]        Today's Diagnoses     Fatigue, unspecified type    -  1    Edema, unspecified type        Kidney transplanted        Aftercare following organ transplant        Complication of transplanted kidney, unspecified complication        Renal osteodystrophy           Follow-ups after your visit        Follow-up notes from your care team     Return in about 4 weeks (around 9/11/2017).      Your next 10 appointments already scheduled     Sep 18, 2017  2:00 PM CDT   (Arrive by 1:30 PM)   Return Kidney Transplant with Jean Pierre Navarrete MD   [facility] ([facility] [place])    [address] [place] [id number] [phone]              Future tests that were ordered for you today     Open Future Orders        Priority Expected Expires Ordered    TSH with free T4 reflex Routine  9/13/2017 8/14/2017    EBV DNA PCR Quantitative Whole Blood Routine  9/13/2017 8/14/2017    CMV DNA quantification Routine  9/13/2017 8/14/2017    Parathyroid Hormone Intact Routine  9/13/2017 8/14/2017    Magnesium Routine  9/13/2017 8/14/2017    Phosphorus Routine  9/13/2017 8/14/2017            Who to contact     If you have questions or need follow up information about today's clinic visit or your schedule please contact [facility] directly at [phone].  Normal or non-critical lab and imaging results will be communicated to you by MyChart, letter or phone within 4 business days after the clinic has received the results. If you do not hear from us within 7 days, please contact the clinic through MyChart or phone. If you have a critical or abnormal lab result, we will notify you by phone as soon as possible.  Submit  "refill requests through [employer] or call your pharmacy and they will forward the refill request to us. Please allow 3 business days for your refill to be completed.          Additional Information About Your Visit        [employer] Information     [employer] gives you secure access to your electronic health record. If you see a primary care provider, you can also send messages to your care team and make appointments. If you have questions, please call your primary care clinic.  If you do not have a primary care provider, please call [phone] and they will assist you.        Care EveryWhere ID     This is your Care EveryWhere ID. This could be used by other organizations to access your [place] medical records  [id number]        Your Vitals Were     Pulse Temperature Height Pulse Oximetry BMI (Body Mass Index)       90 98.3  F (36.8  C) (Oral) 1.626 m (5' 4\") 98% 28.25 kg/m2        Blood Pressure from Last 3 Encounters:   08/14/17 129/74   05/16/17 128/62   01/17/17 (!) 160/91    Weight from Last 3 Encounters:   08/14/17 74.7 kg (164 lb 9.6 oz)   05/16/17 74.3 kg (163 lb 12.8 oz)   01/17/17 78.3 kg (172 lb 9.6 oz)                 Today's Medication Changes          These changes are accurate as of: 8/14/17  2:45 PM.  If you have any questions, ask your nurse or doctor.               These medicines have changed or have updated prescriptions.        Dose/Directions    bumetanide 0.5 MG tablet   Commonly known as:  BUMEX   This may have changed:  See the new instructions.   Used for:  Edema, unspecified type   Changed by:  Jean Pierre Navarrete MD        Dose:  0.5 mg   Take 1 tablet (0.5 mg) by mouth daily   Quantity:  90 tablet   Refills:  3            Where to get your medicines      These medications were sent to [facility] - [address] [address]     Phone:  [phone]     bumetanide 0.5 MG tablet                Primary Care Provider Office " Phone # Fax #    Tano Koch [phone]       [facility] [address] [address]        Equal Access to Services     SRINIVAS MCGARRY : Neno steven ku cartero Sodeidraali, waaxda luqadaha, qaybta kaalmada adealvina, shell mcbridetaz hager. So [facility] [phone].    ATENCIÓN: Si habla español, tiene a zapata disposición servicios gratuitos de asistencia lingüística. Aurea al [phone].    We comply with applicable federal civil rights laws and [place] laws. We do not discriminate on the basis of race, color, national origin, age, disability sex, sexual orientation or gender identity.            Thank you!     Thank you for choosing [facility]  for your care. Our goal is always to provide you with excellent care. Hearing back from our patients is one way we can continue to improve our services. Please take a few minutes to complete the written survey that you may receive in the mail after your visit with us. Thank you!             Your Updated Medication List - Protect others around you: Learn how to safely use, store and throw away your medicines at www.disposemymeds.org.          This list is accurate as of: 8/14/17  2:45 PM.  Always use your most recent med list.                   Brand Name Dispense Instructions for use Diagnosis    acetaminophen 325 MG tablet    TYLENOL     Take 325 mg by mouth as needed        ASPIRIN PO      Take 81 mg by mouth daily        blood glucose monitoring lancets      Dispense item covered by pt ins. E11.9 IDDM type II - Test 4 times/day. Reason: Insulin Pump        bumetanide 0.5 MG tablet    BUMEX    90 tablet    Take 1 tablet (0.5 mg) by mouth daily    Edema, unspecified type       Calcium Acetate (Phos Binder) 667 MG Caps     270 capsule    Take 667 mg by mouth 3 times daily (with meals)    Kidney transplanted, Complications, kidney transplant       Fish Oil 1000 MG Cpdr      Take  by mouth. One capsule  twice daily.  Indications: Arteriosclerotic Heart Disease        FLUZONE HIGH-DOSE 0.5 ML injection   Generic drug:  influenza Vac Split High-Dose      TO BE ADMINISTERED BY PHARMACIST FOR IMMUNIZATION        GLUCAGON EMERGENCY 1 MG kit   Generic drug:  glucagon      Inject 1 mg subcutaneous. As  idrected  Indications: HYPOGLYCEMIC DISORDER        insulin lispro 100 UNIT/ML injection    HumaLOG     Inject 100 Units Subcutaneous 3 times daily (before meals)        KAYEXALATE Powd     453 g    Take 30 mls by mouth, Mix with water to equal 30 ml. Only take if Potassium level >5.5    Hyperkalemia       magnesium oxide 400 (241.3 MG) MG tablet    MAG-OX    60 tablet    Take 1 tablet (400 mg) by mouth daily    Kidney transplanted, Aftercare following organ transplant, Complication of transplanted kidney, unspecified complication       metoprolol 25 MG tablet    LOPRESSOR    180 tablet    Take 1 tablet (25 mg) by mouth 2 times daily    Renal hypertension, stage 1-4 or unspecified chronic kidney disease       mycophenolic acid EC tablet     60 tablet    Take 1 tablet (360 mg) by mouth 2 times daily    Kidney replaced by transplant       nitroGLYcerin 0.4 MG sublingual tablet    NITROSTAT     Place 0.4 mg under the tongue        omeprazole 20 MG CR capsule    priLOSEC     Take 20 mg by mouth        ONE TOUCH ULTRA test strip   Generic drug:  blood glucose monitoring      Use to test blood glucose 10 times daily        penicillin V 250 mg/5 mL suspension    VEETID     Take 160-800 mg by mouth 3 TAB BEFORE AND 3 TAB AFTER DENTAL PROCEDURE        REGLAN 10 MG tablet   Generic drug:  metoclopramide      Take 10 mg by mouth as needed        sulfamethoxazole-trimethoprim 800-160 MG per tablet    BACTRIM DS     1 tab q MWF for prophylaxis  Indications: PROPHYLAXIS        tacrolimus 0.5 MG capsule    GENERIC EQUIVALENT    120 capsule    Take 2 capsules (1 mg) by mouth 2 times daily    Immunosuppression (H)

## 2017-08-14 NOTE — LETTER
8/14/2017      RE: Dixie Cunha  [address]  [facility] [address]       Assessment and Plan:  1. LDKT - 2003, new baseline 2-2.5 mg/dL   2. Immunosuppression:  MMF and prograf goal 4-6 ug/L   3. HTN: acceptable BP,  Now euvolemic on bumex 0.5 mg daily   4. CAD: LHC was done and showed obstructive CAD only amenable to medical intervention currently asymptomatic   5. DM: well control, Hg A1c in 6 range, following with endocrinology  6. Skin cancer: no recurrence follows with dermatology   7. Dyslipidemia: continues with statin did not switch to crestor   Will continue with monthly labs    8. CKD 4 but stable   9. Anemia of CKD stable HGB   10. MICHELA: PTH is appropriate will need repeat and her vitamin D was replete last checked   11. Fatigue: will need to check virals and TSH with next lab draw   Assessment and plan was discussed with patient and she voiced her understanding and agreement.      Reason for Visit:  Ms. Cunha is here for kidney transplant follow up.     HPI:   Dixie Cunha is a 71 year old year old female with PMH significant for DM (well controled), complicated by CAD s/p GABG 2003, that failed ( as per patient her vein were to small), now with PHT, RHF, diabetic nephropathy s/p LDKT in 2003 from her sister, with baseline Cr was 1.4-1.5, until 8/12/16. She was found with a Cr of 3.5 during her routine f/u with transplant nephrology. Her immunosuppression had been Rapamune and Tacrolimus. 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 Her immunosuppression was switched to Tac/MPA with good tolerance.    Since she was seen last, she has been feeling fine. Her allograft functiosn are stable. Her DM is under good control. She is regular with her drugs and labs.         Transplant Hx:       Tx: LDKT  Date: 2003       Present Maintenance IS:Tac myfortic being tapered of mTOR     Baseline Creatinine: 2-2.5 mg/dL       Recent  DSA: No         Biopsy: Yes: 9/16/16: oxalate nephropathy       Home BP: 140 /70 mmHg      ROS:   A comprehensive review of systems was obtained and negative, except as noted in the HPI or PMH.    Active Medical Problems:  Patient Active Problem List   Diagnosis     Diabetes (H)     Kidney replaced by transplant     CAD (coronary artery disease)     S/P CABG (coronary artery bypass graft)     Skin cancer     Secondary oxalosis (H)     Hypertension     Aftercare following organ transplant     Immunosuppression (H)       Personal Hx:  Social History     Social History     Marital status:      Spouse name: N/A     Number of children: N/A     Years of education: N/A     Occupational History     Not on file.     Social History Main Topics     Smoking status: Former Smoker     Smokeless tobacco: Not on file     Alcohol use Yes     Drug use: No     Sexual activity: Not on file     Other Topics Concern     Not on file     Social History Narrative       Allergies:  Allergies   Allergen Reactions     Gabapentin Swelling       Medications:  Prior to Admission medications    Medication Sig Start Date End Date Taking? Authorizing Provider   chlorthalidone (HYGROTON) 25 MG tablet Take 1 tablet (25 mg) by mouth daily 9/20/16  Yes Nydia Sosa MD   blood glucose monitoring (ONE TOUCH ULTRASOFT) lancets Dispense item covered by pt ins. E11.9 IDDM type II - Test 4 times/day. Reason: Insulin Pump 8/16/16  Yes Reported, Patient   metoclopramide (REGLAN) 10 MG tablet Take 10 mg by mouth 8/16/16  Yes Reported, Patient   acetaminophen (TYLENOL) 325 MG tablet Take 325 mg by mouth 10/3/14  Yes Reported, Patient   insulin lispro (HUMALOG) 100 UNIT/ML VIAL Inject 100 Units Subcutaneous 3 times daily (before meals)   Yes Reported, Patient   penicillin V (VEETID) 250 mg/5 mL suspension Take 160-800 mg by mouth 3 TAB BEFORE AND 3 TAB AFTER DENTAL PROCEDURE   Yes Reported, Patient   RAPAMUNE 0.5 MG PO TABLET Take 1 tablet (0.5 mg)  "by mouth daily Total dose = 1.5 mg daily  Patient taking differently: Take 1 mg by mouth daily Total dose = 1.5 mg daily 9/14/16  Yes Kareem White MD   PROGRAF 1 MG PO CAPSULE Take 1 capsule (1 mg) by mouth 2 times daily 8/29/16  Yes Kareem White MD   aspirin  MG tablet Take 325 mg by mouth 10/3/14  Yes Reported, Patient   glucagon (GLUCAGON EMERGENCY) 1 MG injection Inject 1 mg subcutaneous. As  idrected  Indications: HYPOGLYCEMIC DISORDER 10/3/14  Yes Reported, Patient   metoprolol (LOPRESSOR) 25 MG tablet Take 25 mg by mouth 11/10/14  Yes Reported, Patient   Omega-3 Fatty Acids (FISH OIL) 1000 MG CPDR Take  by mouth. One capsule twice daily.  Indications: Arteriosclerotic Heart Disease 10/3/14  Yes Reported, Patient   omeprazole (PRILOSEC) 20 MG capsule Take 20 mg by mouth 10/3/14  Yes Reported, Patient   blood glucose (ONE TOUCH ULTRA) test strip Use to test blood glucose 10 times daily 12/12/14  Yes Reported, Patient   simvastatin (ZOCOR) 20 MG tablet Take 20 mg by mouth 10/3/14  Yes Reported, Patient   sulfamethoxazole-trimethoprim (BACTRIM DS) 800-160 MG per tablet 1 tab q MWF for prophylaxis  Indications: PROPHYLAXIS 10/3/14  Yes Reported, Patient       Vitals:  /74  Pulse 90  Temp 98.3  F (36.8  C) (Oral)  Ht 1.626 m (5' 4\")  Wt 74.7 kg (164 lb 9.6 oz)  SpO2 98%  BMI 28.25 kg/m2    Exam:   HENT: mouth without ulcers or lesions  LYMPHATICS: no cervical or supraclavicular nodes  RESP: lungs clear to auscultation - no rales, rhonchi or wheezes  CV: regular rhythm, normal rate, no rub, no murmur  EDEMA: no LE edema bilateral  ABDOMEN: soft, nondistended, nontender, bowel sounds normal  MS: extremities normal - no gross deformities noted, no evidence of inflammation in joints, no muscle tenderness  SKIN: no rash    Results: labs reviewed,            Jean Pierre Navarrete MD      "

## 2017-08-14 NOTE — NURSING NOTE
"Chief Complaint   Patient presents with     RECHECK     Kidney follow up       Initial /74  Pulse 90  Temp 98.3  F (36.8  C) (Oral)  Ht 1.626 m (5' 4\")  Wt 74.7 kg (164 lb 9.6 oz)  SpO2 98%  BMI 28.25 kg/m2 Estimated body mass index is 28.25 kg/(m^2) as calculated from the following:    Height as of this encounter: 1.626 m (5' 4\").    Weight as of this encounter: 74.7 kg (164 lb 9.6 oz).  Medication Reconciliation: complete   MARJORIE FAGAN CMA      "

## 2017-08-16 LAB
MYCOPHENOLATE SERPL LC/MS/MS-MCNC: 2.36 MG/L (ref 1–3.5)
MYCOPHENOLATE-G SERPL LC/MS/MS-MCNC: 129.9 MG/L (ref 30–95)
TME LAST DOSE: ABNORMAL H

## 2017-08-19 NOTE — PROGRESS NOTES
Assessment and Plan:  1. LDKT - 2003, new baseline 2-2.5 mg/dL   2. Immunosuppression:  MMF and prograf goal 4-6 ug/L   3. HTN: acceptable BP,  Now euvolemic on bumex 0.5 mg daily   4. CAD: LHC was done and showed obstructive CAD only amenable to medical intervention currently asymptomatic   5. DM: well control, Hg A1c in 6 range, following with endocrinology  6. Skin cancer: no recurrence follows with dermatology   7. Dyslipidemia: continues with statin did not switch to crestor   Will continue with monthly labs    8. CKD 4 but stable   9. Anemia of CKD stable HGB   10. MICHELA: PTH is appropriate will need repeat and her vitamin D was replete last checked   11. Fatigue: will need to check virals and TSH with next lab draw   Assessment and plan was discussed with patient and she voiced her understanding and agreement.      Reason for Visit:  Ms. Cunha is here for kidney transplant follow up.     HPI:   Dixie Cunha is a 71 year old year old female with PMH significant for DM (well controled), complicated by CAD s/p GABG 2003, that failed ( as per patient her vein were to small), now with PHT, RHF, diabetic nephropathy s/p LDKT in 2003 from her sister, with baseline Cr was 1.4-1.5, until 8/12/16. She was found with a Cr of 3.5 during her routine f/u with transplant nephrology. Her immunosuppression had been Rapamune and Tacrolimus. 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 Her immunosuppression was switched to Tac/MPA with good tolerance.    Since she was seen last, she has been feeling fine. Her allograft functiosn are stable. Her DM is under good control. She is regular with her drugs and labs.         Transplant Hx:       Tx: LDKT  Date: 2003       Present Maintenance IS:Tac myfortic being tapered of mTOR     Baseline Creatinine: 2-2.5 mg/dL       Recent DSA: No         Biopsy: Yes: 9/16/16: oxalate nephropathy       Home BP: 140 /70 mmHg       ROS:   A comprehensive review of systems was obtained and negative, except as noted in the HPI or PMH.    Active Medical Problems:  Patient Active Problem List   Diagnosis     Diabetes (H)     Kidney replaced by transplant     CAD (coronary artery disease)     S/P CABG (coronary artery bypass graft)     Skin cancer     Secondary oxalosis (H)     Hypertension     Aftercare following organ transplant     Immunosuppression (H)       Personal Hx:  Social History     Social History     Marital status:      Spouse name: N/A     Number of children: N/A     Years of education: N/A     Occupational History     Not on file.     Social History Main Topics     Smoking status: Former Smoker     Smokeless tobacco: Not on file     Alcohol use Yes     Drug use: No     Sexual activity: Not on file     Other Topics Concern     Not on file     Social History Narrative       Allergies:  Allergies   Allergen Reactions     Gabapentin Swelling       Medications:  Prior to Admission medications    Medication Sig Start Date End Date Taking? Authorizing Provider   chlorthalidone (HYGROTON) 25 MG tablet Take 1 tablet (25 mg) by mouth daily 9/20/16  Yes Nydia Sosa MD   blood glucose monitoring (ONE TOUCH ULTRASOFT) lancets Dispense item covered by pt ins. E11.9 IDDM type II - Test 4 times/day. Reason: Insulin Pump 8/16/16  Yes Reported, Patient   metoclopramide (REGLAN) 10 MG tablet Take 10 mg by mouth 8/16/16  Yes Reported, Patient   acetaminophen (TYLENOL) 325 MG tablet Take 325 mg by mouth 10/3/14  Yes Reported, Patient   insulin lispro (HUMALOG) 100 UNIT/ML VIAL Inject 100 Units Subcutaneous 3 times daily (before meals)   Yes Reported, Patient   penicillin V (VEETID) 250 mg/5 mL suspension Take 160-800 mg by mouth 3 TAB BEFORE AND 3 TAB AFTER DENTAL PROCEDURE   Yes Reported, Patient   RAPAMUNE 0.5 MG PO TABLET Take 1 tablet (0.5 mg) by mouth daily Total dose = 1.5 mg daily  Patient taking differently: Take 1 mg by mouth  "daily Total dose = 1.5 mg daily 9/14/16  Yes Kareem White MD   PROGRAF 1 MG PO CAPSULE Take 1 capsule (1 mg) by mouth 2 times daily 8/29/16  Yes Kareem White MD   aspirin  MG tablet Take 325 mg by mouth 10/3/14  Yes Reported, Patient   glucagon (GLUCAGON EMERGENCY) 1 MG injection Inject 1 mg subcutaneous. As  idrected  Indications: HYPOGLYCEMIC DISORDER 10/3/14  Yes Reported, Patient   metoprolol (LOPRESSOR) 25 MG tablet Take 25 mg by mouth 11/10/14  Yes Reported, Patient   Omega-3 Fatty Acids (FISH OIL) 1000 MG CPDR Take  by mouth. One capsule twice daily.  Indications: Arteriosclerotic Heart Disease 10/3/14  Yes Reported, Patient   omeprazole (PRILOSEC) 20 MG capsule Take 20 mg by mouth 10/3/14  Yes Reported, Patient   blood glucose (ONE TOUCH ULTRA) test strip Use to test blood glucose 10 times daily 12/12/14  Yes Reported, Patient   simvastatin (ZOCOR) 20 MG tablet Take 20 mg by mouth 10/3/14  Yes Reported, Patient   sulfamethoxazole-trimethoprim (BACTRIM DS) 800-160 MG per tablet 1 tab q MWF for prophylaxis  Indications: PROPHYLAXIS 10/3/14  Yes Reported, Patient       Vitals:  /74  Pulse 90  Temp 98.3  F (36.8  C) (Oral)  Ht 1.626 m (5' 4\")  Wt 74.7 kg (164 lb 9.6 oz)  SpO2 98%  BMI 28.25 kg/m2    Exam:   HENT: mouth without ulcers or lesions  LYMPHATICS: no cervical or supraclavicular nodes  RESP: lungs clear to auscultation - no rales, rhonchi or wheezes  CV: regular rhythm, normal rate, no rub, no murmur  EDEMA: no LE edema bilateral  ABDOMEN: soft, nondistended, nontender, bowel sounds normal  MS: extremities normal - no gross deformities noted, no evidence of inflammation in joints, no muscle tenderness  SKIN: no rash    Results: labs reviewed,      Answers for HPI/ROS submitted by the patient on 8/9/2017   General Symptoms: No  Skin Symptoms: No  HENT Symptoms: No  EYE SYMPTOMS: No  HEART SYMPTOMS: No  LUNG SYMPTOMS: No  INTESTINAL SYMPTOMS: No  URINARY SYMPTOMS: " No  GYNECOLOGIC SYMPTOMS: No  BREAST SYMPTOMS: No  SKELETAL SYMPTOMS: No  BLOOD SYMPTOMS: No  NERVOUS SYSTEM SYMPTOMS: No  MENTAL HEALTH SYMPTOMS: No

## 2017-08-21 ENCOUNTER — CARE COORDINATION (OUTPATIENT)
Dept: NEPHROLOGY | Facility: CLINIC | Age: 72
End: 2017-08-21

## 2017-08-21 DIAGNOSIS — E55.9 VITAMIN D DEFICIENCY: Primary | ICD-10-CM

## 2017-08-21 NOTE — PROGRESS NOTES
Per Dr. Navarrete:  Please make sure she has the lab requisitions I ordered to work up the fatigue (ordered during last encounter) also add vitamin D     Mychart message sent to patient with update.    Debbie Olsen RN

## 2017-09-13 DIAGNOSIS — Z94.0 IMMUNOSUPPRESSIVE MANAGEMENT ENCOUNTER FOLLOWING KIDNEY TRANSPLANT: ICD-10-CM

## 2017-09-13 DIAGNOSIS — T86.10 COMPLICATIONS, KIDNEY TRANSPLANT: ICD-10-CM

## 2017-09-13 DIAGNOSIS — Z48.298 AFTERCARE FOLLOWING ORGAN TRANSPLANT: ICD-10-CM

## 2017-09-13 DIAGNOSIS — Z94.0 KIDNEY TRANSPLANTED: ICD-10-CM

## 2017-09-13 DIAGNOSIS — Z79.899 IMMUNOSUPPRESSIVE MANAGEMENT ENCOUNTER FOLLOWING KIDNEY TRANSPLANT: ICD-10-CM

## 2017-09-14 ENCOUNTER — HOSPITAL ENCOUNTER (OUTPATIENT)
Facility: CLINIC | Age: 72
Setting detail: SPECIMEN
Discharge: HOME | End: 2017-09-14
Admitting: INTERNAL MEDICINE
Payer: COMMERCIAL

## 2017-09-14 PROCEDURE — 80180 DRUG SCRN QUAN MYCOPHENOLATE: CPT | Performed by: INTERNAL MEDICINE

## 2017-09-14 PROCEDURE — 80197 ASSAY OF TACROLIMUS: CPT | Performed by: INTERNAL MEDICINE

## 2017-09-15 LAB
TACROLIMUS BLD-MCNC: 5.7 UG/L (ref 5–15)
TME LAST DOSE: NORMAL H

## 2017-09-16 LAB
MYCOPHENOLATE SERPL LC/MS/MS-MCNC: 2.18 MG/L (ref 1–3.5)
MYCOPHENOLATE-G SERPL LC/MS/MS-MCNC: 118.9 MG/L (ref 30–95)
TME LAST DOSE: ABNORMAL H

## 2017-09-18 ENCOUNTER — TELEPHONE (OUTPATIENT)
Dept: TRANSPLANT | Facility: CLINIC | Age: 72
End: 2017-09-18

## 2017-09-18 ENCOUNTER — OFFICE VISIT (OUTPATIENT)
Dept: NEPHROLOGY | Facility: CLINIC | Age: 72
End: 2017-09-18
Attending: INTERNAL MEDICINE
Payer: COMMERCIAL

## 2017-09-18 VITALS
WEIGHT: 166.8 LBS | HEIGHT: 64 IN | DIASTOLIC BLOOD PRESSURE: 82 MMHG | TEMPERATURE: 98.2 F | SYSTOLIC BLOOD PRESSURE: 123 MMHG | BODY MASS INDEX: 28.48 KG/M2 | HEART RATE: 97 BPM | OXYGEN SATURATION: 99 %

## 2017-09-18 DIAGNOSIS — Z94.0 KIDNEY TRANSPLANTED: ICD-10-CM

## 2017-09-18 DIAGNOSIS — T86.10 COMPLICATIONS, KIDNEY TRANSPLANT: ICD-10-CM

## 2017-09-18 DIAGNOSIS — E78.5 HYPERLIPIDEMIA, UNSPECIFIED HYPERLIPIDEMIA TYPE: Primary | ICD-10-CM

## 2017-09-18 DIAGNOSIS — R53.83 FATIGUE, UNSPECIFIED TYPE: ICD-10-CM

## 2017-09-18 DIAGNOSIS — Z94.0 KIDNEY REPLACED BY TRANSPLANT: Primary | ICD-10-CM

## 2017-09-18 DIAGNOSIS — N25.0 RENAL OSTEODYSTROPHY: ICD-10-CM

## 2017-09-18 DIAGNOSIS — R60.9 EDEMA, UNSPECIFIED TYPE: ICD-10-CM

## 2017-09-18 DIAGNOSIS — N25.81 SECONDARY RENAL HYPERPARATHYROIDISM (H): ICD-10-CM

## 2017-09-18 DIAGNOSIS — T86.10 COMPLICATION OF TRANSPLANTED KIDNEY, UNSPECIFIED COMPLICATION: ICD-10-CM

## 2017-09-18 DIAGNOSIS — E55.9 VITAMIN D DEFICIENCY: ICD-10-CM

## 2017-09-18 DIAGNOSIS — Z48.298 AFTERCARE FOLLOWING ORGAN TRANSPLANT: ICD-10-CM

## 2017-09-18 LAB
ALBUMIN SERPL-MCNC: 3.6 G/DL (ref 3.4–5)
ANION GAP SERPL CALCULATED.3IONS-SCNC: 5 MMOL/L (ref 3–14)
BUN SERPL-MCNC: 50 MG/DL (ref 7–30)
CALCIUM SERPL-MCNC: 9.3 MG/DL (ref 8.5–10.1)
CHLORIDE SERPL-SCNC: 102 MMOL/L (ref 94–109)
CO2 SERPL-SCNC: 32 MMOL/L (ref 20–32)
CREAT SERPL-MCNC: 2.16 MG/DL (ref 0.52–1.04)
CREAT UR-MCNC: 49 MG/DL
ERYTHROCYTE [DISTWIDTH] IN BLOOD BY AUTOMATED COUNT: 15.3 % (ref 10–15)
GFR SERPL CREATININE-BSD FRML MDRD: 22 ML/MIN/1.7M2
GLUCOSE SERPL-MCNC: 143 MG/DL (ref 70–99)
HCT VFR BLD AUTO: 40 % (ref 35–47)
HGB BLD-MCNC: 12.1 G/DL (ref 11.7–15.7)
MCH RBC QN AUTO: 28 PG (ref 26.5–33)
MCHC RBC AUTO-ENTMCNC: 30.3 G/DL (ref 31.5–36.5)
MCV RBC AUTO: 93 FL (ref 78–100)
PHOSPHATE SERPL-MCNC: 3.6 MG/DL (ref 2.5–4.5)
PLATELET # BLD AUTO: 218 10E9/L (ref 150–450)
POTASSIUM SERPL-SCNC: 4.5 MMOL/L (ref 3.4–5.3)
PROT UR-MCNC: 0.08 G/L
PROT/CREAT 24H UR: 0.16 G/G CR (ref 0–0.2)
RBC # BLD AUTO: 4.32 10E12/L (ref 3.8–5.2)
SODIUM SERPL-SCNC: 139 MMOL/L (ref 133–144)
WBC # BLD AUTO: 6.1 10E9/L (ref 4–11)

## 2017-09-18 PROCEDURE — 80069 RENAL FUNCTION PANEL: CPT | Performed by: INTERNAL MEDICINE

## 2017-09-18 PROCEDURE — 87799 DETECT AGENT NOS DNA QUANT: CPT | Performed by: INTERNAL MEDICINE

## 2017-09-18 PROCEDURE — 85027 COMPLETE CBC AUTOMATED: CPT | Performed by: INTERNAL MEDICINE

## 2017-09-18 PROCEDURE — 99212 OFFICE O/P EST SF 10 MIN: CPT | Mod: ZF

## 2017-09-18 PROCEDURE — 36415 COLL VENOUS BLD VENIPUNCTURE: CPT | Performed by: INTERNAL MEDICINE

## 2017-09-18 PROCEDURE — 84156 ASSAY OF PROTEIN URINE: CPT | Performed by: INTERNAL MEDICINE

## 2017-09-18 RX ORDER — ROSUVASTATIN CALCIUM 5 MG/1
5 TABLET, COATED ORAL DAILY
Qty: 90 TABLET | Refills: 3 | Status: SHIPPED | OUTPATIENT
Start: 2017-09-18

## 2017-09-18 RX ORDER — BUMETANIDE 1 MG/1
1 TABLET ORAL DAILY
Qty: 90 TABLET | Refills: 3 | Status: SHIPPED | OUTPATIENT
Start: 2017-09-18 | End: 2018-08-23

## 2017-09-18 ASSESSMENT — PAIN SCALES - GENERAL: PAINLEVEL: NO PAIN (0)

## 2017-09-18 NOTE — LETTER
9/18/2017      RE: Dixie Cunha  [address]  [facility] [address]       Assessment and Plan:  1. LDKT - 2003, new baseline 2-2.5 mg/dL which remains stable    2. Immunosuppression:  MPA and prograf goal 4-6 ug/L, reduced tacrolimus dose to 4- 6 ug/L   3. HTN: acceptable BP,  Now euvolemic on bumex 1 mg daily   4. CAD: LHC was done and showed obstructive CAD only amenable to medical intervention currently asymptomatic   5. DM: well control, Hg A1c in 7.2 range, following with endocrinology  6. Skin cancer: no recurrence follows with dermatology, B3 500 mg po bid trial   7. Dyslipidemia: continues with statin will switch to crestor 5 mg Will continue with monthly labs    8. CKD 4 but stable   9. Anemia of CKD stable HGB   10. MICHELA: PTH is appropriate will need repeat and her vitamin D was replete last checked   11. Fatigue: resolved     Assessment and plan was discussed with patient and she voiced her understanding and agreement.      Reason for Visit:  Ms. Cunha is here for kidney transplant follow up.     HPI:   Dixie Cunha is a 71 year old year old female with PMH significant for DM (well controled), complicated by CAD s/p GABG 2003, that failed ( as per patient her vein were to small), now with PHT, RHF, diabetic nephropathy s/p LDKT in 2003 from her sister, with baseline Cr was 1.4-1.5, until 8/12/16. She was found with a Cr of 3.5 during her routine f/u with transplant nephrology. Her immunosuppression had been Rapamune and Tacrolimus. 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 Her immunosuppression was switched to Tac/MPA with good tolerance.    Since she was seen last, she has been feeling really well. Her allograft functiosn are stable. Her DM is under good control. She is regular with her drugs and labs.         Transplant Hx:       Tx: LDKT  Date: 2003       Present Maintenance IS:Tac myfortic being tapered of mTOR      Baseline Creatinine: 2-2.5 mg/dL       Recent DSA: No         Biopsy: Yes: 9/16/16: oxalate nephropathy       Home BP: 140 /70 mmHg      ROS:   A comprehensive review of systems was obtained and negative, except as noted in the HPI or PMH.    Active Medical Problems:  Patient Active Problem List   Diagnosis     Diabetes (H)     Kidney replaced by transplant     CAD (coronary artery disease)     S/P CABG (coronary artery bypass graft)     Skin cancer     Secondary oxalosis (H)     Hypertension     Aftercare following organ transplant     Immunosuppression (H)       Personal Hx:  Social History     Social History     Marital status:      Spouse name: N/A     Number of children: N/A     Years of education: N/A     Occupational History     Not on file.     Social History Main Topics     Smoking status: Former Smoker     Smokeless tobacco: Not on file     Alcohol use Yes     Drug use: No     Sexual activity: Not on file     Other Topics Concern     Not on file     Social History Narrative       Allergies:  Allergies   Allergen Reactions     Gabapentin Swelling       Medications:  Prior to Admission medications    Medication Sig Start Date End Date Taking? Authorizing Provider   chlorthalidone (HYGROTON) 25 MG tablet Take 1 tablet (25 mg) by mouth daily 9/20/16  Yes Nydia Sosa MD   blood glucose monitoring (ONE TOUCH ULTRASOFT) lancets Dispense item covered by pt ins. E11.9 IDDM type II - Test 4 times/day. Reason: Insulin Pump 8/16/16  Yes Reported, Patient   metoclopramide (REGLAN) 10 MG tablet Take 10 mg by mouth 8/16/16  Yes Reported, Patient   acetaminophen (TYLENOL) 325 MG tablet Take 325 mg by mouth 10/3/14  Yes Reported, Patient   insulin lispro (HUMALOG) 100 UNIT/ML VIAL Inject 100 Units Subcutaneous 3 times daily (before meals)   Yes Reported, Patient   penicillin V (VEETID) 250 mg/5 mL suspension Take 160-800 mg by mouth 3 TAB BEFORE AND 3 TAB AFTER DENTAL PROCEDURE   Yes Reported, Patient  "  RAPAMUNE 0.5 MG PO TABLET Take 1 tablet (0.5 mg) by mouth daily Total dose = 1.5 mg daily  Patient taking differently: Take 1 mg by mouth daily Total dose = 1.5 mg daily 9/14/16  Yes Kareem White MD   PROGRAF 1 MG PO CAPSULE Take 1 capsule (1 mg) by mouth 2 times daily 8/29/16  Yes Kareem White MD   aspirin  MG tablet Take 325 mg by mouth 10/3/14  Yes Reported, Patient   glucagon (GLUCAGON EMERGENCY) 1 MG injection Inject 1 mg subcutaneous. As  idrected  Indications: HYPOGLYCEMIC DISORDER 10/3/14  Yes Reported, Patient   metoprolol (LOPRESSOR) 25 MG tablet Take 25 mg by mouth 11/10/14  Yes Reported, Patient   Omega-3 Fatty Acids (FISH OIL) 1000 MG CPDR Take  by mouth. One capsule twice daily.  Indications: Arteriosclerotic Heart Disease 10/3/14  Yes Reported, Patient   omeprazole (PRILOSEC) 20 MG capsule Take 20 mg by mouth 10/3/14  Yes Reported, Patient   blood glucose (ONE TOUCH ULTRA) test strip Use to test blood glucose 10 times daily 12/12/14  Yes Reported, Patient   simvastatin (ZOCOR) 20 MG tablet Take 20 mg by mouth 10/3/14  Yes Reported, Patient   sulfamethoxazole-trimethoprim (BACTRIM DS) 800-160 MG per tablet 1 tab q MWF for prophylaxis  Indications: PROPHYLAXIS 10/3/14  Yes Reported, Patient       Vitals:  /82  Pulse 97  Temp 98.2  F (36.8  C) (Oral)  Ht 1.626 m (5' 4\")  Wt 75.7 kg (166 lb 12.8 oz)  SpO2 99%  BMI 28.63 kg/m2    Exam:   HENT: mouth without ulcers or lesions  LYMPHATICS: no cervical or supraclavicular nodes  RESP: lungs clear to auscultation - no rales, rhonchi or wheezes  CV: regular rhythm, normal rate, no rub, no murmur  EDEMA: no LE edema bilateral  ABDOMEN: soft, nondistended, nontender, bowel sounds normal  MS: extremities normal - no gross deformities noted, no evidence of inflammation in joints, no muscle tenderness  SKIN: no rash    Results: labs reviewed,        Jean Pierre Navarrete MD      "

## 2017-09-18 NOTE — NURSING NOTE
"Chief Complaint   Patient presents with     RECHECK     Kidney transplant follow up       Initial /82  Pulse 97  Temp 98.2  F (36.8  C) (Oral)  Ht 1.626 m (5' 4\")  Wt 75.7 kg (166 lb 12.8 oz)  SpO2 99%  BMI 28.63 kg/m2 Estimated body mass index is 28.63 kg/(m^2) as calculated from the following:    Height as of this encounter: 1.626 m (5' 4\").    Weight as of this encounter: 75.7 kg (166 lb 12.8 oz).  Medication Reconciliation: complete   MARJORIE FAGAN CMA      "

## 2017-09-18 NOTE — MR AVS SNAPSHOT
After Visit Summary   9/18/2017    Dixie Cunha    MRN: [id number]           Patient Information     Date Of Birth          1945        Visit Information        Provider Department      9/18/2017 2:00 PM Jean Pierre Navarrete MD [facility]        Today's Diagnoses     Hyperlipidemia, unspecified hyperlipidemia type    -  1    Kidney transplanted        Aftercare following organ transplant        Complication of transplanted kidney, unspecified complication        Edema, unspecified type        Secondary renal hyperparathyroidism (H)           Follow-ups after your visit        Follow-up notes from your care team     Return in about 3 months (around 12/18/2017).      Your next 10 appointments already scheduled     Nov 27, 2017  1:10 PM CST   (Arrive by 12:40 PM)   Return Kidney Transplant with Jean Pierre Navarrete MD   [facility] ([facility])    [address] [place] [id number] [phone]              Future tests that were ordered for you today     Open Future Orders        Priority Expected Expires Ordered    Tacrolimus level Routine  9/18/2018 9/18/2017            Who to contact     If you have questions or need follow up information about today's clinic visit or your schedule please contact [facility] directly at [phone].  Normal or non-critical lab and imaging results will be communicated to you by MyChart, letter or phone within 4 business days after the clinic has received the results. If you do not hear from us within 7 days, please contact the clinic through MyChart or phone. If you have a critical or abnormal lab result, we will notify you by phone as soon as possible.  Submit refill requests through [employer] or call your pharmacy and they will forward the refill request to us. Please allow 3 business days for your refill to be completed.          Additional Information About Your Visit        MyChart  "Information     [employer] gives you secure access to your electronic health record. If you see a primary care provider, you can also send messages to your care team and make appointments. If you have questions, please call your primary care clinic.  If you do not have a primary care provider, please call [phone] and they will assist you.        Care EveryWhere ID     This is your Care EveryWhere ID. This could be used by other organizations to access your [facility] medical records  [id number]        Your Vitals Were     Pulse Temperature Height Pulse Oximetry BMI (Body Mass Index)       97 98.2  F (36.8  C) (Oral) 1.626 m (5' 4\") 99% 28.63 kg/m2        Blood Pressure from Last 3 Encounters:   09/18/17 123/82   08/14/17 129/74   05/16/17 128/62    Weight from Last 3 Encounters:   09/18/17 75.7 kg (166 lb 12.8 oz)   08/14/17 74.7 kg (164 lb 9.6 oz)   05/16/17 74.3 kg (163 lb 12.8 oz)                 Today's Medication Changes          These changes are accurate as of: 9/18/17  2:29 PM.  If you have any questions, ask your nurse or doctor.               Start taking these medicines.        Dose/Directions    rosuvastatin 5 MG tablet   Commonly known as:  CRESTOR   Used for:  Hyperlipidemia, unspecified hyperlipidemia type   Started by:  Jean Pierre Navarrete MD        Dose:  5 mg   Take 1 tablet (5 mg) by mouth daily   Quantity:  90 tablet   Refills:  3         These medicines have changed or have updated prescriptions.        Dose/Directions    bumetanide 1 MG tablet   Commonly known as:  BUMEX   This may have changed:    - medication strength  - how much to take   Used for:  Edema, unspecified type   Changed by:  Jean Pierre Navarrete MD        Dose:  1 mg   Take 1 tablet (1 mg) by mouth daily   Quantity:  90 tablet   Refills:  3         Stop taking these medicines if you haven't already. Please contact your care team if you have questions.     FLUZONE HIGH-DOSE 0.5 ML injection   Generic drug:  influenza Vac Split " High-Dose   Stopped by:  Jean Pierre Navarrete MD                Where to get your medicines      These medications were sent to [facility] - [place], [place] - [address] [facility] [address]     Phone:  [phone]     bumetanide 1 MG tablet    magnesium oxide 400 (241.3 MG) MG tablet    rosuvastatin 5 MG tablet                Primary Care Provider Office Phone # Fax #    Tano Koch [phone]       [facility] [address] [place] [id number]        Equal Access to Services     [facility]: Hadii aad ku hadasho Soomaali, waaxda luqadaha, qaybta kaalmada adeegyada, shell lin . So [facility] [phone].    ATENCIÓN: Si habla español, tiene a zapata disposición servicios gratuitos de asistencia lingüística. [facility] [phone].    We comply with applicable federal civil rights laws and [place] laws. We do not discriminate on the basis of race, color, national origin, age, disability sex, sexual orientation or gender identity.            Thank you!     Thank you for choosing [facility]  for your care. Our goal is always to provide you with excellent care. Hearing back from our patients is one way we can continue to improve our services. Please take a few minutes to complete the written survey that you may receive in the mail after your visit with us. Thank you!             Your Updated Medication List - Protect others around you: Learn how to safely use, store and throw away your medicines at www.disposemymeds.org.          This list is accurate as of: 9/18/17  2:29 PM.  Always use your most recent med list.                   Brand Name Dispense Instructions for use Diagnosis    acetaminophen 325 MG tablet    TYLENOL     Take 325 mg by mouth as needed        ASPIRIN PO      Take 81 mg by mouth daily        blood glucose monitoring lancets      Dispense item covered by pt ins. E11.9 IDDM  type II - Test 4 times/day. Reason: Insulin Pump        bumetanide 1 MG tablet    BUMEX    90 tablet    Take 1 tablet (1 mg) by mouth daily    Edema, unspecified type       Calcium Acetate (Phos Binder) 667 MG Caps     270 capsule    Take 667 mg by mouth 3 times daily (with meals)    Kidney transplanted, Complications, kidney transplant       Fish Oil 1000 MG Cpdr      Take  by mouth. One capsule twice daily.  Indications: Arteriosclerotic Heart Disease        GLUCAGON EMERGENCY 1 MG kit   Generic drug:  glucagon      Inject 1 mg subcutaneous. As  idrected  Indications: HYPOGLYCEMIC DISORDER        insulin lispro 100 UNIT/ML injection    HumaLOG     Inject 100 Units Subcutaneous 3 times daily (before meals)        KAYEXALATE Powd     453 g    Take 30 mls by mouth, Mix with water to equal 30 ml. Only take if Potassium level >5.5    Hyperkalemia       magnesium oxide 400 (241.3 MG) MG tablet    MAG-OX    90 tablet    Take 1 tablet (400 mg) by mouth daily    Kidney transplanted, Aftercare following organ transplant, Complication of transplanted kidney, unspecified complication       metoprolol 25 MG tablet    LOPRESSOR    180 tablet    Take 1 tablet (25 mg) by mouth 2 times daily    Renal hypertension, stage 1-4 or unspecified chronic kidney disease       mycophenolic acid EC tablet     60 tablet    Take 1 tablet (360 mg) by mouth 2 times daily    Kidney replaced by transplant       nitroGLYcerin 0.4 MG sublingual tablet    NITROSTAT     Place 0.4 mg under the tongue        omeprazole 20 MG CR capsule    priLOSEC     Take 20 mg by mouth        ONE TOUCH ULTRA test strip   Generic drug:  blood glucose monitoring      Use to test blood glucose 10 times daily        penicillin V 250 mg/5 mL suspension    VEETID     Take 160-800 mg by mouth 3 TAB BEFORE AND 3 TAB AFTER DENTAL PROCEDURE        REGLAN 10 MG tablet   Generic drug:  metoclopramide      Take 10 mg by mouth as needed        rosuvastatin 5 MG tablet    CRESTOR     90 tablet    Take 1 tablet (5 mg) by mouth daily    Hyperlipidemia, unspecified hyperlipidemia type       sulfamethoxazole-trimethoprim 800-160 MG per tablet    BACTRIM DS     1 tab q MW for prophylaxis  Indications: PROPHYLAXIS        tacrolimus 0.5 MG capsule    GENERIC EQUIVALENT    120 capsule    Take 2 capsules (1 mg) by mouth 2 times daily    Immunosuppression (H)

## 2017-09-18 NOTE — PROGRESS NOTES
Assessment and Plan:  1. LDKT - 2003, new baseline 2-2.5 mg/dL which remains stable    2. Immunosuppression:  MPA and prograf goal 4-6 ug/L, reduced tacrolimus dose to 4- 6 ug/L   3. HTN: acceptable BP,  Now euvolemic on bumex 1 mg daily   4. CAD: LHC was done and showed obstructive CAD only amenable to medical intervention currently asymptomatic   5. DM: well control, Hg A1c in 7.2 range, following with endocrinology  6. Skin cancer: no recurrence follows with dermatology, B3 500 mg po bid trial   7. Dyslipidemia: continues with statin will switch to crestor 5 mg Will continue with monthly labs    8. CKD 4 but stable   9. Anemia of CKD stable HGB   10. MICHELA: PTH is appropriate will need repeat and her vitamin D was replete last checked   11. Fatigue: resolved     Assessment and plan was discussed with patient and she voiced her understanding and agreement.      Reason for Visit:  Ms. Cunha is here for kidney transplant follow up.     HPI:   Dixie Cunha is a 71 year old year old female with PMH significant for DM (well controled), complicated by CAD s/p GABG 2003, that failed ( as per patient her vein were to small), now with PHT, RHF, diabetic nephropathy s/p LDKT in 2003 from her sister, with baseline Cr was 1.4-1.5, until 8/12/16. She was found with a Cr of 3.5 during her routine f/u with transplant nephrology. Her immunosuppression had been Rapamune and Tacrolimus. 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 Her immunosuppression was switched to Tac/MPA with good tolerance.    Since she was seen last, she has been feeling really well. Her allograft functiosn are stable. Her DM is under good control. She is regular with her drugs and labs.         Transplant Hx:       Tx: LDKT  Date: 2003       Present Maintenance IS:Tac myfortic being tapered of mTOR     Baseline Creatinine: 2-2.5 mg/dL       Recent DSA: No         Biopsy: Yes: 9/16/16:  oxalate nephropathy       Home BP: 140 /70 mmHg      ROS:   A comprehensive review of systems was obtained and negative, except as noted in the HPI or PMH.    Active Medical Problems:  Patient Active Problem List   Diagnosis     Diabetes (H)     Kidney replaced by transplant     CAD (coronary artery disease)     S/P CABG (coronary artery bypass graft)     Skin cancer     Secondary oxalosis (H)     Hypertension     Aftercare following organ transplant     Immunosuppression (H)       Personal Hx:  Social History     Social History     Marital status:      Spouse name: N/A     Number of children: N/A     Years of education: N/A     Occupational History     Not on file.     Social History Main Topics     Smoking status: Former Smoker     Smokeless tobacco: Not on file     Alcohol use Yes     Drug use: No     Sexual activity: Not on file     Other Topics Concern     Not on file     Social History Narrative       Allergies:  Allergies   Allergen Reactions     Gabapentin Swelling       Medications:  Prior to Admission medications    Medication Sig Start Date End Date Taking? Authorizing Provider   chlorthalidone (HYGROTON) 25 MG tablet Take 1 tablet (25 mg) by mouth daily 9/20/16  Yes Nydia Sosa MD   blood glucose monitoring (ONE TOUCH ULTRASOFT) lancets Dispense item covered by pt ins. E11.9 IDDM type II - Test 4 times/day. Reason: Insulin Pump 8/16/16  Yes Reported, Patient   metoclopramide (REGLAN) 10 MG tablet Take 10 mg by mouth 8/16/16  Yes Reported, Patient   acetaminophen (TYLENOL) 325 MG tablet Take 325 mg by mouth 10/3/14  Yes Reported, Patient   insulin lispro (HUMALOG) 100 UNIT/ML VIAL Inject 100 Units Subcutaneous 3 times daily (before meals)   Yes Reported, Patient   penicillin V (VEETID) 250 mg/5 mL suspension Take 160-800 mg by mouth 3 TAB BEFORE AND 3 TAB AFTER DENTAL PROCEDURE   Yes Reported, Patient   RAPAMUNE 0.5 MG PO TABLET Take 1 tablet (0.5 mg) by mouth daily Total dose = 1.5 mg  "daily  Patient taking differently: Take 1 mg by mouth daily Total dose = 1.5 mg daily 9/14/16  Yes Kareem White MD   PROGRAF 1 MG PO CAPSULE Take 1 capsule (1 mg) by mouth 2 times daily 8/29/16  Yes Kareem White MD   aspirin  MG tablet Take 325 mg by mouth 10/3/14  Yes Reported, Patient   glucagon (GLUCAGON EMERGENCY) 1 MG injection Inject 1 mg subcutaneous. As  idrected  Indications: HYPOGLYCEMIC DISORDER 10/3/14  Yes Reported, Patient   metoprolol (LOPRESSOR) 25 MG tablet Take 25 mg by mouth 11/10/14  Yes Reported, Patient   Omega-3 Fatty Acids (FISH OIL) 1000 MG CPDR Take  by mouth. One capsule twice daily.  Indications: Arteriosclerotic Heart Disease 10/3/14  Yes Reported, Patient   omeprazole (PRILOSEC) 20 MG capsule Take 20 mg by mouth 10/3/14  Yes Reported, Patient   blood glucose (ONE TOUCH ULTRA) test strip Use to test blood glucose 10 times daily 12/12/14  Yes Reported, Patient   simvastatin (ZOCOR) 20 MG tablet Take 20 mg by mouth 10/3/14  Yes Reported, Patient   sulfamethoxazole-trimethoprim (BACTRIM DS) 800-160 MG per tablet 1 tab q MWF for prophylaxis  Indications: PROPHYLAXIS 10/3/14  Yes Reported, Patient       Vitals:  /82  Pulse 97  Temp 98.2  F (36.8  C) (Oral)  Ht 1.626 m (5' 4\")  Wt 75.7 kg (166 lb 12.8 oz)  SpO2 99%  BMI 28.63 kg/m2    Exam:   HENT: mouth without ulcers or lesions  LYMPHATICS: no cervical or supraclavicular nodes  RESP: lungs clear to auscultation - no rales, rhonchi or wheezes  CV: regular rhythm, normal rate, no rub, no murmur  EDEMA: no LE edema bilateral  ABDOMEN: soft, nondistended, nontender, bowel sounds normal  MS: extremities normal - no gross deformities noted, no evidence of inflammation in joints, no muscle tenderness  SKIN: no rash    Results: labs reviewed,      "

## 2017-09-19 LAB
CMV DNA SPEC NAA+PROBE-ACNC: NORMAL [IU]/ML
CMV DNA SPEC NAA+PROBE-LOG#: NORMAL {LOG_IU}/ML
SPECIMEN SOURCE: NORMAL

## 2017-09-20 LAB
EBV DNA # SPEC NAA+PROBE: 2600 {COPIES}/ML
EBV DNA SPEC NAA+PROBE-LOG#: 3.4 {LOG_COPIES}/ML

## 2017-09-20 NOTE — PROGRESS NOTES
Sent to physician for review  Note new EBV 2600. Of note, EBV PCR doen at an outside facility on 9/13/17 was negative.   Please respond to Gamal- thanks

## 2017-09-22 ENCOUNTER — TELEPHONE (OUTPATIENT)
Dept: TRANSPLANT | Facility: CLINIC | Age: 72
End: 2017-09-22

## 2017-09-22 DIAGNOSIS — Z94.0 KIDNEY REPLACED BY TRANSPLANT: Primary | ICD-10-CM

## 2017-09-22 NOTE — LETTER
PHYSICIAN ORDERS      DATE & TIME ISSUED: 2017 4:38 PM  PATIENT NAME: Dixie Cunha   : 1945     [facility] MR# [if applicable]: [id number]     DIAGNOSIS:  Kidney transplant  ICD-9 CODE: Z94.0      EBV PCR QT Level monthly x3     Any questions please call: [phone]    Please fax these results to [phone].      Jean Pierre Navarrete

## 2017-09-22 NOTE — TELEPHONE ENCOUNTER
Jean Pierre Navarrete MD Steen, Daniel C, RN                     Lets repeat the ebv monthly x 3           Plan   Please update lab letter and update epic orders  Please fax and mail lab orders to patient

## 2017-09-27 DIAGNOSIS — Z94.0 KIDNEY REPLACED BY TRANSPLANT: ICD-10-CM

## 2017-09-27 DIAGNOSIS — Z48.298 AFTERCARE FOLLOWING ORGAN TRANSPLANT: ICD-10-CM

## 2017-09-27 DIAGNOSIS — D84.9 IMMUNOSUPPRESSION (H): Primary | ICD-10-CM

## 2017-09-27 LAB
TACROLIMUS BLD-MCNC: 6 UG/L (ref 5–15)
TME LAST DOSE: NORMAL H

## 2017-09-27 PROCEDURE — 87799 DETECT AGENT NOS DNA QUANT: CPT | Performed by: FAMILY MEDICINE

## 2017-09-27 PROCEDURE — 36415 COLL VENOUS BLD VENIPUNCTURE: CPT | Performed by: FAMILY MEDICINE

## 2017-09-27 PROCEDURE — 80197 ASSAY OF TACROLIMUS: CPT | Performed by: FAMILY MEDICINE

## 2017-09-28 LAB
EBV DNA # SPEC NAA+PROBE: 776 {COPIES}/ML
EBV DNA SPEC NAA+PROBE-LOG#: 2.9 {LOG_COPIES}/ML

## 2017-09-28 RX ORDER — TACROLIMUS 0.5 MG/1
CAPSULE ORAL
Qty: 90 CAPSULE | Refills: 11 | Status: SHIPPED | OUTPATIENT
Start: 2017-09-28 | End: 2018-01-29

## 2017-09-28 NOTE — TELEPHONE ENCOUNTER
Spoke to patient regarding tac level = 6, above goal.  Patient states that she was in to see Dr. Navarrete and he lowered her dose to Tacrolimus 1 mg in the AM and 0.5 mg in the PM.  Patient will repeat labs next month.

## 2017-09-28 NOTE — TELEPHONE ENCOUNTER
Tacrolimus level 6.0, goal 4-5.  Current dose 1 mg twice daily.    PLAN:  Decrease tacrolimus dose to 1 mg every morning and 0.5 mg every evening.  Check tacrolimus level 1 week after dose change.    TASK:  Call patient with instructions for dose change and blood test.

## 2017-10-17 ENCOUNTER — TELEPHONE (OUTPATIENT)
Dept: TRANSPLANT | Facility: CLINIC | Age: 72
End: 2017-10-17

## 2017-10-17 DIAGNOSIS — Z94.0 KIDNEY REPLACED BY TRANSPLANT: ICD-10-CM

## 2017-10-17 NOTE — TELEPHONE ENCOUNTER
Patient Call: Transplant Lab  Reason for call: Annual lab reorder   Pt needs standing orders entered into [facility] for  clinic

## 2017-10-20 DIAGNOSIS — Z48.298 AFTERCARE FOLLOWING ORGAN TRANSPLANT: ICD-10-CM

## 2017-10-20 DIAGNOSIS — Z94.0 KIDNEY TRANSPLANTED: ICD-10-CM

## 2017-10-20 DIAGNOSIS — Z79.899 IMMUNOSUPPRESSIVE MANAGEMENT ENCOUNTER FOLLOWING KIDNEY TRANSPLANT: ICD-10-CM

## 2017-10-20 DIAGNOSIS — Z94.0 KIDNEY REPLACED BY TRANSPLANT: ICD-10-CM

## 2017-10-20 DIAGNOSIS — T86.10 COMPLICATIONS, KIDNEY TRANSPLANT: ICD-10-CM

## 2017-10-20 DIAGNOSIS — Z94.0 IMMUNOSUPPRESSIVE MANAGEMENT ENCOUNTER FOLLOWING KIDNEY TRANSPLANT: ICD-10-CM

## 2017-10-20 LAB
ERYTHROCYTE [DISTWIDTH] IN BLOOD BY AUTOMATED COUNT: 15.8 % (ref 10–15)
HCT VFR BLD AUTO: 37.6 % (ref 35–47)
HGB BLD-MCNC: 11.5 G/DL (ref 11.7–15.7)
MCH RBC QN AUTO: 28.4 PG (ref 26.5–33)
MCHC RBC AUTO-ENTMCNC: 30.6 G/DL (ref 31.5–36.5)
MCV RBC AUTO: 93 FL (ref 78–100)
PLATELET # BLD AUTO: 209 10E9/L (ref 150–450)
RBC # BLD AUTO: 4.05 10E12/L (ref 3.8–5.2)
TACROLIMUS BLD-MCNC: 4.9 UG/L (ref 5–15)
TME LAST DOSE: ABNORMAL H
WBC # BLD AUTO: 4.9 10E9/L (ref 4–11)

## 2017-10-20 PROCEDURE — 85027 COMPLETE CBC AUTOMATED: CPT | Performed by: INTERNAL MEDICINE

## 2017-10-20 PROCEDURE — 80197 ASSAY OF TACROLIMUS: CPT | Performed by: INTERNAL MEDICINE

## 2017-10-20 PROCEDURE — 36415 COLL VENOUS BLD VENIPUNCTURE: CPT | Performed by: INTERNAL MEDICINE

## 2017-10-20 PROCEDURE — 80069 RENAL FUNCTION PANEL: CPT | Performed by: INTERNAL MEDICINE

## 2017-10-20 PROCEDURE — 87799 DETECT AGENT NOS DNA QUANT: CPT | Performed by: INTERNAL MEDICINE

## 2017-10-20 PROCEDURE — 80180 DRUG SCRN QUAN MYCOPHENOLATE: CPT | Performed by: INTERNAL MEDICINE

## 2017-10-21 LAB
ALBUMIN SERPL-MCNC: 3.8 G/DL (ref 3.4–5)
ANION GAP SERPL CALCULATED.3IONS-SCNC: 7 MMOL/L (ref 3–14)
BUN SERPL-MCNC: 62 MG/DL (ref 7–30)
CALCIUM SERPL-MCNC: 9.5 MG/DL (ref 8.5–10.1)
CHLORIDE SERPL-SCNC: 100 MMOL/L (ref 94–109)
CO2 SERPL-SCNC: 30 MMOL/L (ref 20–32)
CREAT SERPL-MCNC: 2.15 MG/DL (ref 0.52–1.04)
GFR SERPL CREATININE-BSD FRML MDRD: 23 ML/MIN/1.7M2
GLUCOSE SERPL-MCNC: 117 MG/DL (ref 70–99)
PHOSPHATE SERPL-MCNC: 3.9 MG/DL (ref 2.5–4.5)
POTASSIUM SERPL-SCNC: 5 MMOL/L (ref 3.4–5.3)
SODIUM SERPL-SCNC: 137 MMOL/L (ref 133–144)

## 2017-10-23 LAB
EBV DNA # SPEC NAA+PROBE: 1619 {COPIES}/ML
EBV DNA SPEC NAA+PROBE-LOG#: 3.2 {LOG_COPIES}/ML

## 2017-10-24 LAB
MYCOPHENOLATE SERPL LC/MS/MS-MCNC: 3.46 MG/L (ref 1–3.5)
MYCOPHENOLATE-G SERPL LC/MS/MS-MCNC: 141.3 MG/L (ref 30–95)
TME LAST DOSE: ABNORMAL H

## 2017-10-25 ENCOUNTER — TELEPHONE (OUTPATIENT)
Dept: TRANSPLANT | Facility: CLINIC | Age: 72
End: 2017-10-25

## 2017-10-25 DIAGNOSIS — Z94.0 KIDNEY REPLACED BY TRANSPLANT: Primary | ICD-10-CM

## 2017-10-25 NOTE — TELEPHONE ENCOUNTER
----- Message from Jean Pierre Navarrete MD sent at 10/23/2017  7:49 PM CDT -----  Regarding: RE: EBV 1619  No changes   Quarterly ebv check x 2 if under 3 k no further checks are needed   ----- Message -----     From: Aretha Ramsey, ALBERT     Sent: 10/23/2017   3:36 PM       To: Jean Pierre Navarrete MD, Ora Obrien RN  Subject: EBV 1619                                         Dr Navarrete,    Any changes in immunosuppression medications?

## 2017-11-20 DIAGNOSIS — Z94.0 KIDNEY REPLACED BY TRANSPLANT: ICD-10-CM

## 2017-11-20 DIAGNOSIS — Z79.899 IMMUNOSUPPRESSIVE MANAGEMENT ENCOUNTER FOLLOWING KIDNEY TRANSPLANT: ICD-10-CM

## 2017-11-20 DIAGNOSIS — Z94.0 IMMUNOSUPPRESSIVE MANAGEMENT ENCOUNTER FOLLOWING KIDNEY TRANSPLANT: ICD-10-CM

## 2017-11-20 DIAGNOSIS — Z48.298 AFTERCARE FOLLOWING ORGAN TRANSPLANT: ICD-10-CM

## 2017-11-20 DIAGNOSIS — Z94.0 KIDNEY TRANSPLANTED: ICD-10-CM

## 2017-11-20 DIAGNOSIS — T86.10 COMPLICATIONS, KIDNEY TRANSPLANT: ICD-10-CM

## 2017-11-20 LAB
ERYTHROCYTE [DISTWIDTH] IN BLOOD BY AUTOMATED COUNT: 15.7 % (ref 10–15)
HCT VFR BLD AUTO: 35.1 % (ref 35–47)
HGB BLD-MCNC: 10.6 G/DL (ref 11.7–15.7)
MCH RBC QN AUTO: 28.4 PG (ref 26.5–33)
MCHC RBC AUTO-ENTMCNC: 30.2 G/DL (ref 31.5–36.5)
MCV RBC AUTO: 94 FL (ref 78–100)
PLATELET # BLD AUTO: 243 10E9/L (ref 150–450)
RBC # BLD AUTO: 3.73 10E12/L (ref 3.8–5.2)
WBC # BLD AUTO: 3.8 10E9/L (ref 4–11)

## 2017-11-20 PROCEDURE — 87799 DETECT AGENT NOS DNA QUANT: CPT | Performed by: INTERNAL MEDICINE

## 2017-11-20 PROCEDURE — 80069 RENAL FUNCTION PANEL: CPT | Performed by: INTERNAL MEDICINE

## 2017-11-20 PROCEDURE — 36415 COLL VENOUS BLD VENIPUNCTURE: CPT | Performed by: INTERNAL MEDICINE

## 2017-11-20 PROCEDURE — 80180 DRUG SCRN QUAN MYCOPHENOLATE: CPT | Performed by: INTERNAL MEDICINE

## 2017-11-20 PROCEDURE — 85027 COMPLETE CBC AUTOMATED: CPT | Performed by: INTERNAL MEDICINE

## 2017-11-21 LAB
ALBUMIN SERPL-MCNC: 3.5 G/DL (ref 3.4–5)
ANION GAP SERPL CALCULATED.3IONS-SCNC: 12 MMOL/L (ref 3–14)
BUN SERPL-MCNC: 70 MG/DL (ref 7–30)
CALCIUM SERPL-MCNC: 9.2 MG/DL (ref 8.5–10.1)
CHLORIDE SERPL-SCNC: 106 MMOL/L (ref 94–109)
CO2 SERPL-SCNC: 22 MMOL/L (ref 20–32)
CREAT SERPL-MCNC: 2.71 MG/DL (ref 0.52–1.04)
EBV DNA # SPEC NAA+PROBE: <500 {COPIES}/ML
EBV DNA SPEC NAA+PROBE-LOG#: <2.7 {LOG_COPIES}/ML
GFR SERPL CREATININE-BSD FRML MDRD: 17 ML/MIN/1.7M2
GLUCOSE SERPL-MCNC: 177 MG/DL (ref 70–99)
PHOSPHATE SERPL-MCNC: 3.2 MG/DL (ref 2.5–4.5)
POTASSIUM SERPL-SCNC: 4.6 MMOL/L (ref 3.4–5.3)
SODIUM SERPL-SCNC: 140 MMOL/L (ref 133–144)

## 2017-11-22 ENCOUNTER — TELEPHONE (OUTPATIENT)
Dept: TRANSPLANT | Facility: CLINIC | Age: 72
End: 2017-11-22

## 2017-11-22 DIAGNOSIS — R79.89 ELEVATED SERUM CREATININE: Primary | ICD-10-CM

## 2017-11-22 DIAGNOSIS — Z94.0 KIDNEY TRANSPLANT RECIPIENT: ICD-10-CM

## 2017-11-22 DIAGNOSIS — N18.4 CHRONIC KIDNEY DISEASE, STAGE 4, SEVERELY DECREASED GFR (H): ICD-10-CM

## 2017-11-22 LAB
MYCOPHENOLATE SERPL LC/MS/MS-MCNC: 2.24 MG/L (ref 1–3.5)
MYCOPHENOLATE-G SERPL LC/MS/MS-MCNC: 132 MG/L (ref 30–95)
TME LAST DOSE: ABNORMAL H

## 2017-11-23 NOTE — TELEPHONE ENCOUNTER
Creatinine 2.71, up from baseline 2.1-2.5.  14 years post kidney transplat  CKD stage 4    PLAN:  Repeat renal panel in 2-4 weeks (order Epic per Dr. Navarrete)    TASK:   Call with instructions per plan

## 2017-11-27 ENCOUNTER — OFFICE VISIT (OUTPATIENT)
Dept: NEPHROLOGY | Facility: CLINIC | Age: 72
End: 2017-11-27
Attending: INTERNAL MEDICINE
Payer: COMMERCIAL

## 2017-11-27 VITALS
HEART RATE: 87 BPM | HEIGHT: 64 IN | BODY MASS INDEX: 28.31 KG/M2 | TEMPERATURE: 98.1 F | SYSTOLIC BLOOD PRESSURE: 123 MMHG | DIASTOLIC BLOOD PRESSURE: 74 MMHG | WEIGHT: 165.8 LBS | OXYGEN SATURATION: 98 %

## 2017-11-27 DIAGNOSIS — D84.9 IMMUNOSUPPRESSION (H): Primary | ICD-10-CM

## 2017-11-27 DIAGNOSIS — Z86.2 HISTORY OF ANEMIA DUE TO CKD: ICD-10-CM

## 2017-11-27 DIAGNOSIS — Z48.298 AFTERCARE FOLLOWING ORGAN TRANSPLANT: ICD-10-CM

## 2017-11-27 DIAGNOSIS — N18.9 HISTORY OF ANEMIA DUE TO CKD: ICD-10-CM

## 2017-11-27 DIAGNOSIS — N18.4 CKD (CHRONIC KIDNEY DISEASE) STAGE 4, GFR 15-29 ML/MIN (H): ICD-10-CM

## 2017-11-27 PROCEDURE — 99212 OFFICE O/P EST SF 10 MIN: CPT | Mod: ZF

## 2017-11-27 RX ORDER — SULFAMETHOXAZOLE AND TRIMETHOPRIM 400; 80 MG/1; MG/1
1 TABLET ORAL
Qty: 45 TABLET | Refills: 3 | Status: SHIPPED | OUTPATIENT
Start: 2017-11-27 | End: 2018-08-23

## 2017-11-27 ASSESSMENT — PAIN SCALES - GENERAL: PAINLEVEL: NO PAIN (0)

## 2017-11-27 NOTE — MR AVS SNAPSHOT
After Visit Summary   11/27/2017    Dixie Cunha    MRN: [id number]           Patient Information     Date Of Birth          1945        Visit Information        Provider Department      11/27/2017 1:10 PM Jean Pierre Navarrete MD [facility] Nephrology        Today's Diagnoses     Immunosuppression (H)    -  1       Follow-ups after your visit        Follow-up notes from your care team     Return in about 6 months (around 5/27/2018).      Your next 10 appointments already scheduled     May 21, 2018  1:35 PM CDT   (Arrive by 1:05 PM)   Return Kidney Transplant with Jean Pierre Navarrete MD   [facility] Nephrology ([facility])    [address]  [address] [facility] [address] [phone]              Who to contact     If you have questions or need follow up information about today's clinic visit or your schedule please contact [facility] NEPHROLOGY directly at [phone].  Normal or non-critical lab and imaging results will be communicated to you by [employer], letter or phone within 4 business days after the clinic has received the results. If you do not hear from us within 7 days, please contact the clinic through [employer] or phone. If you have a critical or abnormal lab result, we will notify you by phone as soon as possible.  Submit refill requests through [facility] or call your pharmacy and they will forward the refill request to us. Please allow 3 business days for your refill to be completed.          Additional Information About Your Visit        MyChart Information     [facility] gives you secure access to your electronic health record. If you see a primary care provider, you can also send messages to your care team and make appointments. If you have questions, please call your primary care clinic.  If you do not have a primary care provider, please call [phone] and they will assist you.        Care EveryWhere ID     This is your Care EveryWhere ID. This could  "be used by other organizations to access your [facility] medical records  [id number]        Your Vitals Were     Pulse Temperature Height Pulse Oximetry BMI (Body Mass Index)       87 98.1  F (36.7  C) (Oral) 1.626 m (5' 4\") 98% 28.46 kg/m2        Blood Pressure from Last 3 Encounters:   11/27/17 123/74   09/18/17 123/82   08/14/17 129/74    Weight from Last 3 Encounters:   11/27/17 75.2 kg (165 lb 12.8 oz)   09/18/17 75.7 kg (166 lb 12.8 oz)   08/14/17 74.7 kg (164 lb 9.6 oz)              Today, you had the following     No orders found for display         Today's Medication Changes          These changes are accurate as of: 11/27/17  2:05 PM.  If you have any questions, ask your nurse or doctor.               Start taking these medicines.        Dose/Directions    sulfamethoxazole-trimethoprim 400-80 MG per tablet   Commonly known as:  BACTRIM   Used for:  Immunosuppression (H)   Replaces:  sulfamethoxazole-trimethoprim 800-160 MG per tablet   Started by:  Jean Pierre Navarrete MD        Dose:  1 tablet   Take 1 tablet by mouth Every Mon, Wed, Fri Morning   Quantity:  45 tablet   Refills:  3         Stop taking these medicines if you haven't already. Please contact your care team if you have questions.     sulfamethoxazole-trimethoprim 800-160 MG per tablet   Commonly known as:  BACTRIM DS   Replaced by:  sulfamethoxazole-trimethoprim 400-80 MG per tablet   Stopped by:  Jean Pierre Navarrete MD                Where to get your medicines      These medications were sent to [facility] [address] [address]     Phone:  [phone]     sulfamethoxazole-trimethoprim 400-80 MG per tablet                Primary Care Provider Office Phone # Fax #    Tano CHANDNI Koch [phone]       [facility] [address] [place] [id number]        Equal Access to Services     SRINIVAS MCGARRY AH: Neno Boyd, " wasirida lubritrigo, qaybta kaevelin lombardo, shell lin ah. So [facility] [place] [facility] [phone].    ATENCIÓN: Si beau garcia, tiene a zapata disposición servicios gratuitos de asistencia lingüística. Llame al [phone].    We comply with applicable federal civil rights laws and [place] laws. We do not discriminate on the basis of race, color, national origin, age, disability, sex, sexual orientation, or gender identity.            Thank you!     Thank you for choosing [facility] NEPHROLOGY  for your care. Our goal is always to provide you with excellent care. Hearing back from our patients is one way we can continue to improve our services. Please take a few minutes to complete the written survey that you may receive in the mail after your visit with us. Thank you!             Your Updated Medication List - Protect others around you: Learn how to safely use, store and throw away your medicines at www.disposemymeds.org.          This list is accurate as of: 11/27/17  2:05 PM.  Always use your most recent med list.                   Brand Name Dispense Instructions for use Diagnosis    acetaminophen 325 MG tablet    TYLENOL     Take 325 mg by mouth as needed        ASPIRIN PO      Take 81 mg by mouth daily        blood glucose monitoring lancets      Dispense item covered by pt ins. E11.9 IDDM type II - Test 4 times/day. Reason: Insulin Pump        bumetanide 1 MG tablet    BUMEX    90 tablet    Take 1 tablet (1 mg) by mouth daily    Edema, unspecified type       Calcium Acetate (Phos Binder) 667 MG Caps     270 capsule    Take 667 mg by mouth 3 times daily (with meals)    Kidney transplanted, Complications, kidney transplant       Fish Oil 1000 MG Cpdr      Take  by mouth. One capsule twice daily.  Indications: Arteriosclerotic Heart Disease        GLUCAGON EMERGENCY 1 MG kit   Generic drug:  glucagon      Inject 1 mg subcutaneous. As  idrected  Indications: HYPOGLYCEMIC DISORDER        insulin lispro  100 UNIT/ML injection    HumaLOG     Inject 100 Units Subcutaneous 3 times daily (before meals)        KAYEXALATE Powd     453 g    Take 30 mls by mouth, Mix with water to equal 30 ml. Only take if Potassium level >5.5    Hyperkalemia       magnesium oxide 400 (241.3 MG) MG tablet    MAG-OX    90 tablet    Take 1 tablet (400 mg) by mouth daily    Kidney transplanted, Aftercare following organ transplant, Complication of transplanted kidney, unspecified complication       metoprolol 25 MG tablet    LOPRESSOR    180 tablet    Take 1 tablet (25 mg) by mouth 2 times daily    Renal hypertension, stage 1-4 or unspecified chronic kidney disease       mycophenolic acid 360 MG EC tablet     60 tablet    Take 1 tablet (360 mg) by mouth 2 times daily    Kidney replaced by transplant       nitroGLYcerin 0.4 MG sublingual tablet    NITROSTAT     Place 0.4 mg under the tongue        omeprazole 20 MG CR capsule    priLOSEC     Take 20 mg by mouth        ONETOUCH ULTRA test strip   Generic drug:  blood glucose monitoring      Use to test blood glucose 10 times daily        penicillin V 250 mg/5 mL suspension    VEETID     Take 160-800 mg by mouth 3 TAB BEFORE AND 3 TAB AFTER DENTAL PROCEDURE        REGLAN 10 MG tablet   Generic drug:  metoclopramide      Take 10 mg by mouth as needed        rosuvastatin 5 MG tablet    CRESTOR    90 tablet    Take 1 tablet (5 mg) by mouth daily    Hyperlipidemia, unspecified hyperlipidemia type       sulfamethoxazole-trimethoprim 400-80 MG per tablet    BACTRIM    45 tablet    Take 1 tablet by mouth Every Mon, Wed, Fri Morning    Immunosuppression (H)       tacrolimus 0.5 MG capsule    GENERIC EQUIVALENT    90 capsule    Take 2 capsules (1 mg) by mouth every morning and 1 capsules (0.5 mg) every evening    Immunosuppression (H)

## 2017-11-27 NOTE — NURSING NOTE
"Chief Complaint   Patient presents with     RECHECK     3 MO KIDNEY TRANSPLANT FOLLOW UP       Initial /74  Pulse 87  Temp 98.1  F (36.7  C) (Oral)  Ht 1.626 m (5' 4\")  Wt 75.2 kg (165 lb 12.8 oz)  SpO2 98%  BMI 28.46 kg/m2 Estimated body mass index is 28.46 kg/(m^2) as calculated from the following:    Height as of this encounter: 1.626 m (5' 4\").    Weight as of this encounter: 75.2 kg (165 lb 12.8 oz).  Medication Reconciliation: complete   MARJORIE FAGAN CMA      "

## 2017-11-27 NOTE — LETTER
11/27/2017      RE: Dixie Cunha  [address] [facility] [address]       Assessment and Plan:  1. LDKT - 2003, new baseline 2-2.5 mg/dL which remains stable to slightly up   2. Immunosuppression:  MPA and prograf goal 4-6 ug/L, well tolerated   3. HTN: acceptable BP,  Now euvolemic on bumex 1 mg daily   4. CAD: LHC was done and showed obstructive CAD only amenable to medical intervention currently asymptomatic   5. DM: well control, Hg A1c in 6.6 range, following with endocrinology  6. Skin cancer: no recurrence follows with dermatology, B3 500 mg po bid trial   7. Dyslipidemia: continues with statin will switch to crestor 5 mg Will continue with monthly labs    8. CKD 4 but stable   9. Anemia of CKD stable HGB   10. MICHELA: PTH is appropriate will need repeat and her vitamin D was replete last checked   11. Anemia of CKD will refer to anemia clinic    12. Low BK grade viremia no need to check further as it has been stable for several months     Assessment and plan was discussed with patient and she voiced her understanding and agreement.      Reason for Visit:  Ms. Cunha is here for kidney transplant follow up.     HPI:   Dixie Cunha is a 71 year old year old female with PMH significant for DM (well controled), complicated by CAD s/p GABG 2003, that failed ( as per patient her vein were to small), now with PHT, RHF, diabetic nephropathy s/p LDKT in 2003 from her sister, with baseline Cr was 1.4-1.5, until 8/12/16. She was found with a Cr of 3.5 during her routine f/u with transplant nephrology. Her immunosuppression had been Rapamune and Tacrolimus. 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 Her immunosuppression was switched to Tac/MPA with good tolerance.    Since she was seen last, she has been feeling really well. Her allograft functiosn are stable but recently was elevated. She attributed this to her recent trips. Her DM is  under good control. She is regular with her drugs and labs.         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    Active Medical Problems:  Patient Active Problem List   Diagnosis     Diabetes (H)     Kidney replaced by transplant     CAD (coronary artery disease)     S/P CABG (coronary artery bypass graft)     Skin cancer     Secondary oxalosis (H)     Hypertension     Aftercare following organ transplant     Immunosuppression (H)       Personal Hx:  Social History     Social History     Marital status:      Spouse name: N/A     Number of children: N/A     Years of education: N/A     Occupational History     Not on file.     Social History Main Topics     Smoking status: Former Smoker     Smokeless tobacco: Not on file     Alcohol use Yes     Drug use: No     Sexual activity: Not on file     Other Topics Concern     Not on file     Social History Narrative       Allergies:  Allergies   Allergen Reactions     Gabapentin Swelling       Medications:    Current Outpatient Prescriptions on File Prior to Visit:  tacrolimus (GENERIC EQUIVALENT) 0.5 MG capsule Take 2 capsules (1 mg) by mouth every morning and 1 capsules (0.5 mg) every evening   magnesium oxide (MAG-OX) 400 (241.3 MG) MG tablet Take 1 tablet (400 mg) by mouth daily   bumetanide (BUMEX) 1 MG tablet Take 1 tablet (1 mg) by mouth daily   rosuvastatin (CRESTOR) 5 MG tablet Take 1 tablet (5 mg) by mouth daily   MYFORTIC 360 MG PO EC TABLET Take 1 tablet (360 mg) by mouth 2 times daily   Calcium Acetate, Phos Binder, 667 MG CAPS Take 667 mg by mouth 3 times daily (with meals)   metoprolol (LOPRESSOR) 25 MG tablet Take 1 tablet (25 mg) by mouth 2 times  "daily   ASPIRIN PO Take 81 mg by mouth daily   blood glucose monitoring (ONE TOUCH ULTRASOFT) lancets Dispense item covered by pt ins. E11.9 IDDM type II - Test 4 times/day. Reason: Insulin Pump   metoclopramide (REGLAN) 10 MG tablet Take 10 mg by mouth as needed    acetaminophen (TYLENOL) 325 MG tablet Take 325 mg by mouth as needed    penicillin V (VEETID) 250 mg/5 mL suspension Take 160-800 mg by mouth 3 TAB BEFORE AND 3 TAB AFTER DENTAL PROCEDURE   glucagon (GLUCAGON EMERGENCY) 1 MG injection Inject 1 mg subcutaneous. As  idrected  Indications: HYPOGLYCEMIC DISORDER   Omega-3 Fatty Acids (FISH OIL) 1000 MG CPDR Take  by mouth. One capsule twice daily.  Indications: Arteriosclerotic Heart Disease   omeprazole (PRILOSEC) 20 MG capsule Take 20 mg by mouth   blood glucose (ONE TOUCH ULTRA) test strip Use to test blood glucose 10 times daily   Sodium Polystyrene Sulfonate (KAYEXALATE) POWD Take 30 mls by mouth, Mix with water to equal 30 ml. Only take if Potassium level >5.5 (Patient not taking: Reported on 11/27/2017)   nitroglycerin (NITROSTAT) 0.4 MG SL tablet Place 0.4 mg under the tongue   insulin lispro (HUMALOG) 100 UNIT/ML VIAL Inject 100 Units Subcutaneous 3 times daily (before meals)     No current facility-administered medications on file prior to visit.     Vitals:  /74  Pulse 87  Temp 98.1  F (36.7  C) (Oral)  Ht 1.626 m (5' 4\")  Wt 75.2 kg (165 lb 12.8 oz)  SpO2 98%  BMI 28.46 kg/m2    Exam:   HENT: mouth without ulcers or lesions  LYMPHATICS: no cervical or supraclavicular nodes  RESP: lungs clear to auscultation - no rales, rhonchi or wheezes  CV: regular rhythm, normal rate, no rub, no murmur  EDEMA: no LE edema bilateral  ABDOMEN: soft, nondistended, nontender, bowel sounds normal  MS: extremities normal - no gross deformities noted, no evidence of inflammation in joints, no muscle tenderness  SKIN: no rash    Results:   Reviewed       Jean Pierre Navarrete MD      "

## 2017-12-04 NOTE — PROGRESS NOTES
Assessment and Plan:  1. LDKT - 2003, new baseline 2-2.5 mg/dL which remains stable to slightly up   2. Immunosuppression:  MPA and prograf goal 4-6 ug/L, well tolerated   3. HTN: acceptable BP,  Now euvolemic on bumex 1 mg daily   4. CAD: LHC was done and showed obstructive CAD only amenable to medical intervention currently asymptomatic   5. DM: well control, Hg A1c in 6.6 range, following with endocrinology  6. Skin cancer: no recurrence follows with dermatology, B3 500 mg po bid trial   7. Dyslipidemia: continues with statin will switch to crestor 5 mg Will continue with monthly labs    8. CKD 4 but stable   9. Anemia of CKD stable HGB   10. MICHELA: PTH is appropriate will need repeat and her vitamin D was replete last checked   11. Anemia of CKD will refer to anemia clinic    12. Low BK grade viremia no need to check further as it has been stable for several months     Assessment and plan was discussed with patient and she voiced her understanding and agreement.      Reason for Visit:  Ms. Cunha is here for kidney transplant follow up.     HPI:   Dixie Cunha is a 71 year old year old female with PMH significant for DM (well controled), complicated by CAD s/p GABG 2003, that failed ( as per patient her vein were to small), now with PHT, RHF, diabetic nephropathy s/p LDKT in 2003 from her sister, with baseline Cr was 1.4-1.5, until 8/12/16. She was found with a Cr of 3.5 during her routine f/u with transplant nephrology. Her immunosuppression had been Rapamune and Tacrolimus. 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 Her immunosuppression was switched to Tac/MPA with good tolerance.    Since she was seen last, she has been feeling really well. Her allograft functiosn are stable but recently was elevated. She attributed this to her recent trips. Her DM is under good control. She is regular with her drugs and labs.         Transplant Hx:        Tx: LDKT  Date: 2003       Present Maintenance IS:Tac myfortic being tapered of mTOR     Baseline Creatinine: 2-2.5 mg/dL       Recent DSA: No         Biopsy: Yes: 9/16/16: oxalate nephropathy       Home BP: 140 /70 mmHg      ROS:   A comprehensive review of systems was obtained and negative, except as noted in the HPI or PMH.    Active Medical Problems:  Patient Active Problem List   Diagnosis     Diabetes (H)     Kidney replaced by transplant     CAD (coronary artery disease)     S/P CABG (coronary artery bypass graft)     Skin cancer     Secondary oxalosis (H)     Hypertension     Aftercare following organ transplant     Immunosuppression (H)       Personal Hx:  Social History     Social History     Marital status:      Spouse name: N/A     Number of children: N/A     Years of education: N/A     Occupational History     Not on file.     Social History Main Topics     Smoking status: Former Smoker     Smokeless tobacco: Not on file     Alcohol use Yes     Drug use: No     Sexual activity: Not on file     Other Topics Concern     Not on file     Social History Narrative       Allergies:  Allergies   Allergen Reactions     Gabapentin Swelling       Medications:    Current Outpatient Prescriptions on File Prior to Visit:  tacrolimus (GENERIC EQUIVALENT) 0.5 MG capsule Take 2 capsules (1 mg) by mouth every morning and 1 capsules (0.5 mg) every evening   magnesium oxide (MAG-OX) 400 (241.3 MG) MG tablet Take 1 tablet (400 mg) by mouth daily   bumetanide (BUMEX) 1 MG tablet Take 1 tablet (1 mg) by mouth daily   rosuvastatin (CRESTOR) 5 MG tablet Take 1 tablet (5 mg) by mouth daily   MYFORTIC 360 MG PO EC TABLET Take 1 tablet (360 mg) by mouth 2 times daily   Calcium Acetate, Phos Binder, 667 MG CAPS Take 667 mg by mouth 3 times daily (with meals)   metoprolol (LOPRESSOR) 25 MG tablet Take 1 tablet (25 mg) by mouth 2 times daily   ASPIRIN PO Take 81 mg by mouth daily   blood glucose monitoring (ONE TOUCH  "ULTRASOFT) lancets Dispense item covered by pt ins. E11.9 IDDM type II - Test 4 times/day. Reason: Insulin Pump   metoclopramide (REGLAN) 10 MG tablet Take 10 mg by mouth as needed    acetaminophen (TYLENOL) 325 MG tablet Take 325 mg by mouth as needed    penicillin V (VEETID) 250 mg/5 mL suspension Take 160-800 mg by mouth 3 TAB BEFORE AND 3 TAB AFTER DENTAL PROCEDURE   glucagon (GLUCAGON EMERGENCY) 1 MG injection Inject 1 mg subcutaneous. As  idrected  Indications: HYPOGLYCEMIC DISORDER   Omega-3 Fatty Acids (FISH OIL) 1000 MG CPDR Take  by mouth. One capsule twice daily.  Indications: Arteriosclerotic Heart Disease   omeprazole (PRILOSEC) 20 MG capsule Take 20 mg by mouth   blood glucose (ONE TOUCH ULTRA) test strip Use to test blood glucose 10 times daily   Sodium Polystyrene Sulfonate (KAYEXALATE) POWD Take 30 mls by mouth, Mix with water to equal 30 ml. Only take if Potassium level >5.5 (Patient not taking: Reported on 11/27/2017)   nitroglycerin (NITROSTAT) 0.4 MG SL tablet Place 0.4 mg under the tongue   insulin lispro (HUMALOG) 100 UNIT/ML VIAL Inject 100 Units Subcutaneous 3 times daily (before meals)     No current facility-administered medications on file prior to visit.     Vitals:  /74  Pulse 87  Temp 98.1  F (36.7  C) (Oral)  Ht 1.626 m (5' 4\")  Wt 75.2 kg (165 lb 12.8 oz)  SpO2 98%  BMI 28.46 kg/m2    Exam:   HENT: mouth without ulcers or lesions  LYMPHATICS: no cervical or supraclavicular nodes  RESP: lungs clear to auscultation - no rales, rhonchi or wheezes  CV: regular rhythm, normal rate, no rub, no murmur  EDEMA: no LE edema bilateral  ABDOMEN: soft, nondistended, nontender, bowel sounds normal  MS: extremities normal - no gross deformities noted, no evidence of inflammation in joints, no muscle tenderness  SKIN: no rash    Results:   Reviewed     "

## 2017-12-08 ENCOUNTER — TELEPHONE (OUTPATIENT)
Dept: TRANSPLANT | Facility: CLINIC | Age: 72
End: 2017-12-08

## 2017-12-08 NOTE — LETTER
OUTPATIENT LABORATORY TEST ORDER    Patient Name: Dixie Cunha               Transplant Date: 8/26/2003   YOB: 1945                              Issue Date & Time:12/8/2017 12:22 PM    [facility] MR: [id number]                               Exp. Date (1 year after date issued)    Diagnoses: Aftercare of Organ Transplant (ICD-10 Z48.288)   Kidney Transplant (ICD-10 Z94.0)   Long term use of medications (ICD-10 Z79.899)     Lab results to be available on the same day drawn.   Patient should release information to the [facility], [facility].  Please fax to the Transplant Center at [phone].    Every month:   ?CBC and Platelet   ?Basic Metabolic Panel           ?12 hour Mycophenolic acid  level          ?12 hour tacrolimus drug level    Every 6 months:          ?Random Urine for protein/creatinine ratio          ?Liver Function Tests           ?Fasting Lipid Panel      If you have any questions, please call The Transplant Center at [phone] .    Please fax labs to [phone]    .

## 2017-12-08 NOTE — TELEPHONE ENCOUNTER
S:  Dixie would like her lab orders updated.    B:  Dixie's lab orders run from January to January, however, she is leaving for [place] today and will not be back until April.  Since her orders  next month, she would like them updated now so she doesn't have to call again next month.       A:  Please draft new lab orders and mail to temporary address on demographics screen.      R:  I verified temporary address and updated the dates that she will be in [place] this winter.      Teresa Ware.......2017

## 2017-12-19 ENCOUNTER — TELEPHONE (OUTPATIENT)
Dept: PHARMACY | Facility: CLINIC | Age: 72
End: 2017-12-19

## 2017-12-22 NOTE — TELEPHONE ENCOUNTER
Not clear she really needs anemia clinic. Sent message to Dr Navarrete to confirm.    Carri Moreno, PharmD, [facility]  [phone]  December 22, 2017

## 2017-12-26 ENCOUNTER — TELEPHONE (OUTPATIENT)
Dept: NEPHROLOGY | Facility: CLINIC | Age: 72
End: 2017-12-26

## 2017-12-26 DIAGNOSIS — I12.9 RENAL HYPERTENSION, STAGE 1-4 OR UNSPECIFIED CHRONIC KIDNEY DISEASE: ICD-10-CM

## 2017-12-26 RX ORDER — METOPROLOL TARTRATE 25 MG/1
25 TABLET, FILM COATED ORAL 2 TIMES DAILY
Qty: 180 TABLET | Refills: 3 | Status: SHIPPED | OUTPATIENT
Start: 2017-12-26 | End: 2019-02-11

## 2018-01-04 ENCOUNTER — TELEPHONE (OUTPATIENT)
Dept: TRANSPLANT | Facility: CLINIC | Age: 73
End: 2018-01-04

## 2018-01-04 NOTE — TELEPHONE ENCOUNTER
Issue: Tac level elevated at 7, goal is 4-6.  Also BG is 273.    Plan:  Please ask if her last tac level was a 12 hour trough.  No dose change for now, please ensure 12 hour trough level with next lab draw.  Please ask patient if she is following with endocrinology regarding elevated BG.

## 2018-01-05 NOTE — TELEPHONE ENCOUNTER
Call placed to patient: No answer. Detailed voice message left requesting patient ensure next tacrolimus level is a accurate twelve hour level. Also requested patient return call to discuss if she's following an endocrinologist for BG's and if not f\u with her PCP.

## 2018-01-09 NOTE — TELEPHONE ENCOUNTER
Patient Call: Transplant Lab  Reason for call: Clarification    Dixie called back left  01/09/18   She received the VM and she is going to follow up with her PCP in [place] about Blood Glucose and will have her tacro re-checked this month of January.   Stated she does not need call back.

## 2018-01-29 DIAGNOSIS — Z94.0 KIDNEY REPLACED BY TRANSPLANT: ICD-10-CM

## 2018-01-29 DIAGNOSIS — D84.9 IMMUNOSUPPRESSION (H): ICD-10-CM

## 2018-01-29 RX ORDER — TACROLIMUS 0.5 MG/1
CAPSULE ORAL
Qty: 90 CAPSULE | Refills: 11 | Status: SHIPPED | OUTPATIENT
Start: 2018-01-29 | End: 2018-02-08

## 2018-01-29 RX ORDER — MYCOPHENOLIC ACID 360 MG/1
360 TABLET, DELAYED RELEASE ORAL 2 TIMES DAILY
Qty: 180 TABLET | Refills: 3 | Status: SHIPPED | OUTPATIENT
Start: 2018-01-29 | End: 2018-04-26

## 2018-02-06 DIAGNOSIS — D84.9 IMMUNOSUPPRESSION (H): ICD-10-CM

## 2018-02-06 NOTE — TELEPHONE ENCOUNTER
Patient is trying to verify that her most recent lab results were sent. [facility] insists that they were re-sent 2/7. Requesting a call to notify if they have in fact been received.

## 2018-02-06 NOTE — LETTER
PHYSICIAN ORDERS      DATE & TIME ISSUED: 2018 11:35 AM  PATIENT NAME: Dixie Cunha   : 1945     [facility] MR# [if applicable]: [id number]     DIAGNOSIS:  Kidney transplant  ICD-9 CODE: Z94.0      Please decrease tacrolimus dose to 0.5 mg twice daily and recheck level in 1-2 weeks.     Tacrolimus level    Any questions please call: [phone] Option #5    Please fax these results to [phone].      Jean Pierre Navarrete

## 2018-02-06 NOTE — TELEPHONE ENCOUNTER
ISSUE:  Tac 6.8    PLAN:  Let patient know we just received labs from 1/26  DECREASE tacrolimus to 0.5mg twice daily from 1mg every morning and 0.5mg every evening.    Recheck level in 1-2 weeks.

## 2018-02-07 NOTE — TELEPHONE ENCOUNTER
Call placed to patient: No answer. Detailed voice message left with instructions listed below. Will try back

## 2018-02-08 RX ORDER — TACROLIMUS 0.5 MG/1
0.5 CAPSULE ORAL 2 TIMES DAILY
Qty: 60 CAPSULE | Refills: 11 | Status: SHIPPED | OUTPATIENT
Start: 2018-02-08 | End: 2018-05-22

## 2018-02-08 NOTE — TELEPHONE ENCOUNTER
Call placed to patient: No answer. Detailed voice message left requesting patient decrease her tacrolimus dose to 0.5 mg twice daily and recheck level in 1-2 weeks. Order/rx sent

## 2018-02-09 ENCOUNTER — TELEPHONE (OUTPATIENT)
Dept: TRANSPLANT | Facility: CLINIC | Age: 73
End: 2018-02-09

## 2018-02-09 NOTE — TELEPHONE ENCOUNTER
Patient Call: Transplant  Reason for call: patient was returning the call from this week and had some questions.

## 2018-02-09 NOTE — TELEPHONE ENCOUNTER
Call returned to patient: Patient confirm message receipt and made requested dose change. Patient notes an increase in diarrhea and was seen by nephrology in [place]. Dr. Prasad Hollins ( [phone] ) whom made adjustments to some of her medications. Confirms no changes were made to her IS medications. Call placed to Dr. Hollins office to obtain note from Wednesdays appointment.

## 2018-03-05 ENCOUNTER — TELEPHONE (OUTPATIENT)
Dept: NEPHROLOGY | Facility: CLINIC | Age: 73
End: 2018-03-05

## 2018-03-05 NOTE — TELEPHONE ENCOUNTER
Per Dr. Navarrete:    Urine looks infected   Please call patient and arrange for repeat ua and uc   Check for symptoms and if present let me know would start a course of abx     Left voicemail for patient to call back. Also sent Verified Identity Pass message.    Debbie Olsen RN

## 2018-04-10 DIAGNOSIS — Z94.0 KIDNEY REPLACED BY TRANSPLANT: ICD-10-CM

## 2018-04-10 DIAGNOSIS — Z94.0 KIDNEY TRANSPLANTED: ICD-10-CM

## 2018-04-10 DIAGNOSIS — Z48.298 AFTERCARE FOLLOWING ORGAN TRANSPLANT: ICD-10-CM

## 2018-04-10 DIAGNOSIS — T86.10 COMPLICATIONS, KIDNEY TRANSPLANT: ICD-10-CM

## 2018-04-10 LAB
ALBUMIN SERPL-MCNC: 3.8 G/DL (ref 3.4–5)
ANION GAP SERPL CALCULATED.3IONS-SCNC: 10 MMOL/L (ref 3–14)
BUN SERPL-MCNC: 81 MG/DL (ref 7–30)
CALCIUM SERPL-MCNC: 9.2 MG/DL (ref 8.5–10.1)
CHLORIDE SERPL-SCNC: 106 MMOL/L (ref 94–109)
CO2 SERPL-SCNC: 24 MMOL/L (ref 20–32)
CREAT SERPL-MCNC: 2.75 MG/DL (ref 0.52–1.04)
ERYTHROCYTE [DISTWIDTH] IN BLOOD BY AUTOMATED COUNT: 14.8 % (ref 10–15)
GFR SERPL CREATININE-BSD FRML MDRD: 17 ML/MIN/1.7M2
GLUCOSE SERPL-MCNC: 130 MG/DL (ref 70–99)
HCT VFR BLD AUTO: 37.1 % (ref 35–47)
HGB BLD-MCNC: 11.2 G/DL (ref 11.7–15.7)
MCH RBC QN AUTO: 28.6 PG (ref 26.5–33)
MCHC RBC AUTO-ENTMCNC: 30.2 G/DL (ref 31.5–36.5)
MCV RBC AUTO: 95 FL (ref 78–100)
PHOSPHATE SERPL-MCNC: 4.2 MG/DL (ref 2.5–4.5)
PLATELET # BLD AUTO: 201 10E9/L (ref 150–450)
POTASSIUM SERPL-SCNC: 4.3 MMOL/L (ref 3.4–5.3)
RBC # BLD AUTO: 3.92 10E12/L (ref 3.8–5.2)
SODIUM SERPL-SCNC: 140 MMOL/L (ref 133–144)
WBC # BLD AUTO: 4.9 10E9/L (ref 4–11)

## 2018-04-10 PROCEDURE — 80069 RENAL FUNCTION PANEL: CPT | Performed by: INTERNAL MEDICINE

## 2018-04-10 PROCEDURE — 85027 COMPLETE CBC AUTOMATED: CPT | Performed by: INTERNAL MEDICINE

## 2018-04-10 PROCEDURE — 87799 DETECT AGENT NOS DNA QUANT: CPT | Performed by: INTERNAL MEDICINE

## 2018-04-10 PROCEDURE — 36415 COLL VENOUS BLD VENIPUNCTURE: CPT | Performed by: INTERNAL MEDICINE

## 2018-04-11 ENCOUNTER — TELEPHONE (OUTPATIENT)
Dept: TRANSPLANT | Facility: CLINIC | Age: 73
End: 2018-04-11

## 2018-04-11 DIAGNOSIS — Z94.0 KIDNEY REPLACED BY TRANSPLANT: Primary | ICD-10-CM

## 2018-04-11 DIAGNOSIS — Z79.899 ENCOUNTER FOR LONG-TERM CURRENT USE OF MEDICATION: ICD-10-CM

## 2018-04-11 DIAGNOSIS — Z48.298 AFTERCARE FOLLOWING ORGAN TRANSPLANT: ICD-10-CM

## 2018-04-11 LAB
EBV DNA # SPEC NAA+PROBE: <500 {COPIES}/ML
EBV DNA SPEC NAA+PROBE-LOG#: <2.7 {LOG_COPIES}/ML

## 2018-04-11 NOTE — TELEPHONE ENCOUNTER
Creatinine = 2.75 (2.71)  Baseline 2.0-2.5    Notes Recorded by Jean Pierre Navarrete MD on 4/10/2018 at 5:29 PM  Can you check with Mrs. uCnha on this cr rise    LPN task:  Call Dixie Cunha and check if feeling sick?   Having cough/cold/congestion?   Diarrhea?   Dehydration?   Started on new medication?    PLAN:  Instruct to improve hydration and recheck BMP and a UA/UC.  [facility] [phone] (Phone)  [phone] (Fax)

## 2018-04-11 NOTE — LETTER
PHYSICIAN ORDERS      DATE & TIME ISSUED: 2018 12:31 PM  PATIENT NAME: Dixie Cunha   : 1945     [facility] MR# [if applicable]: [id number]     DIAGNOSIS:  Kidney transplant  ICD-9 CODE: Z94.0      Please improve hydration and repeat BMP and UA/UC level in 1-2 weeks.    Any questions please call: [phone] Option #5     Please fax these results to [phone].      Jean Pierre Navarrete

## 2018-04-11 NOTE — TELEPHONE ENCOUNTER
Call placed to patient: No answer. Voice message left requesting patient return call to discuss creatinine level. Order sent. Will try back.

## 2018-04-12 NOTE — TELEPHONE ENCOUNTER
F\u call placed to patient: Patient states that she is currently treated with abx for a sinus infection and she just finished abx for UTI. States that she's having a lot of diarrhea lately and she is following with DOMINIK Cantu regarding this. Patient verbalize understanding to improve hydration and repeat labs in 1-2 weeks. Order sent

## 2018-04-26 DIAGNOSIS — Z94.0 KIDNEY REPLACED BY TRANSPLANT: ICD-10-CM

## 2018-04-26 RX ORDER — MYCOPHENOLIC ACID 360 MG/1
360 TABLET, DELAYED RELEASE ORAL 2 TIMES DAILY
Qty: 180 TABLET | Refills: 3 | Status: SHIPPED | OUTPATIENT
Start: 2018-04-26 | End: 2019-01-22

## 2018-04-30 ENCOUNTER — TELEPHONE (OUTPATIENT)
Dept: TRANSPLANT | Facility: CLINIC | Age: 73
End: 2018-04-30

## 2018-05-10 DIAGNOSIS — Z48.298 AFTERCARE FOLLOWING ORGAN TRANSPLANT: ICD-10-CM

## 2018-05-10 DIAGNOSIS — Z79.899 ENCOUNTER FOR LONG-TERM CURRENT USE OF MEDICATION: ICD-10-CM

## 2018-05-10 DIAGNOSIS — Z94.0 KIDNEY REPLACED BY TRANSPLANT: ICD-10-CM

## 2018-05-10 LAB
ALBUMIN SERPL-MCNC: 3.4 G/DL (ref 3.4–5)
ALBUMIN UR-MCNC: NEGATIVE MG/DL
ALP SERPL-CCNC: 75 U/L (ref 40–150)
ALT SERPL W P-5'-P-CCNC: 32 U/L (ref 0–50)
ANION GAP SERPL CALCULATED.3IONS-SCNC: 8 MMOL/L (ref 3–14)
APPEARANCE UR: CLEAR
AST SERPL W P-5'-P-CCNC: 20 U/L (ref 0–45)
BILIRUB DIRECT SERPL-MCNC: 0.1 MG/DL (ref 0–0.2)
BILIRUB SERPL-MCNC: 0.4 MG/DL (ref 0.2–1.3)
BILIRUB UR QL STRIP: NEGATIVE
BUN SERPL-MCNC: 61 MG/DL (ref 7–30)
CALCIUM SERPL-MCNC: 9.2 MG/DL (ref 8.5–10.1)
CHLORIDE SERPL-SCNC: 108 MMOL/L (ref 94–109)
CHOLEST SERPL-MCNC: 96 MG/DL
CO2 SERPL-SCNC: 26 MMOL/L (ref 20–32)
COLOR UR AUTO: YELLOW
CREAT SERPL-MCNC: 2.42 MG/DL (ref 0.52–1.04)
CREAT UR-MCNC: 51 MG/DL
ERYTHROCYTE [DISTWIDTH] IN BLOOD BY AUTOMATED COUNT: 15.1 % (ref 10–15)
GFR SERPL CREATININE-BSD FRML MDRD: 20 ML/MIN/1.7M2
GLUCOSE SERPL-MCNC: 125 MG/DL (ref 70–99)
GLUCOSE UR STRIP-MCNC: NEGATIVE MG/DL
HCT VFR BLD AUTO: 35.4 % (ref 35–47)
HDLC SERPL-MCNC: 51 MG/DL
HGB BLD-MCNC: 10.7 G/DL (ref 11.7–15.7)
HGB UR QL STRIP: NEGATIVE
KETONES UR STRIP-MCNC: NEGATIVE MG/DL
LDLC SERPL CALC-MCNC: 34 MG/DL
LEUKOCYTE ESTERASE UR QL STRIP: ABNORMAL
MAGNESIUM SERPL-MCNC: 2 MG/DL (ref 1.6–2.3)
MCH RBC QN AUTO: 29.2 PG (ref 26.5–33)
MCHC RBC AUTO-ENTMCNC: 30.2 G/DL (ref 31.5–36.5)
MCV RBC AUTO: 97 FL (ref 78–100)
NITRATE UR QL: NEGATIVE
NON-SQ EPI CELLS #/AREA URNS LPF: ABNORMAL /LPF
NONHDLC SERPL-MCNC: 45 MG/DL
PH UR STRIP: 6 PH (ref 5–7)
PLATELET # BLD AUTO: 244 10E9/L (ref 150–450)
POTASSIUM SERPL-SCNC: 4.9 MMOL/L (ref 3.4–5.3)
PROT SERPL-MCNC: 6.7 G/DL (ref 6.8–8.8)
PROT UR-MCNC: 0.08 G/L
PROT/CREAT 24H UR: 0.16 G/G CR (ref 0–0.2)
RBC # BLD AUTO: 3.67 10E12/L (ref 3.8–5.2)
RBC #/AREA URNS AUTO: ABNORMAL /HPF
SODIUM SERPL-SCNC: 142 MMOL/L (ref 133–144)
SOURCE: ABNORMAL
SP GR UR STRIP: <=1.005 (ref 1–1.03)
TACROLIMUS BLD-MCNC: 8.5 UG/L (ref 5–15)
TME LAST DOSE: 2100 H
TRIGL SERPL-MCNC: 57 MG/DL
UROBILINOGEN UR STRIP-ACNC: 0.2 EU/DL (ref 0.2–1)
WBC # BLD AUTO: 3.8 10E9/L (ref 4–11)
WBC #/AREA URNS AUTO: ABNORMAL /HPF

## 2018-05-10 PROCEDURE — 80180 DRUG SCRN QUAN MYCOPHENOLATE: CPT | Performed by: INTERNAL MEDICINE

## 2018-05-10 PROCEDURE — 84156 ASSAY OF PROTEIN URINE: CPT | Performed by: INTERNAL MEDICINE

## 2018-05-10 PROCEDURE — 80048 BASIC METABOLIC PNL TOTAL CA: CPT | Performed by: INTERNAL MEDICINE

## 2018-05-10 PROCEDURE — 36415 COLL VENOUS BLD VENIPUNCTURE: CPT | Performed by: INTERNAL MEDICINE

## 2018-05-10 PROCEDURE — 83735 ASSAY OF MAGNESIUM: CPT | Performed by: INTERNAL MEDICINE

## 2018-05-10 PROCEDURE — 80061 LIPID PANEL: CPT | Performed by: INTERNAL MEDICINE

## 2018-05-10 PROCEDURE — 80197 ASSAY OF TACROLIMUS: CPT | Performed by: INTERNAL MEDICINE

## 2018-05-10 PROCEDURE — 81001 URINALYSIS AUTO W/SCOPE: CPT | Performed by: INTERNAL MEDICINE

## 2018-05-10 PROCEDURE — 80076 HEPATIC FUNCTION PANEL: CPT | Performed by: INTERNAL MEDICINE

## 2018-05-10 PROCEDURE — 85027 COMPLETE CBC AUTOMATED: CPT | Performed by: INTERNAL MEDICINE

## 2018-05-11 ENCOUNTER — TELEPHONE (OUTPATIENT)
Dept: TRANSPLANT | Facility: CLINIC | Age: 73
End: 2018-05-11

## 2018-05-11 NOTE — TELEPHONE ENCOUNTER
Patient Call: Transplant Lab/Orders  Route to LPN  Post Transplant Days: [id number]  When patient is less than 60 days post-transplant, route high priority    Reason for Call: patient called stated she is taking an anti Biotic for a sinus infection. Medication is Clairithromycin 500 mg 2 times a day and it may cause her Tacrolimus to go up. Patient is wondering if she needs to get labs repeated before her monthly are due?  Callback needed? Yes    Return Call Needed  Same as documented in contacts section  When to return call?: Greater than one day: Route standard priority

## 2018-05-11 NOTE — TELEPHONE ENCOUNTER
Call returned to patient: Patient verbalize understanding that abx doesn't interact with her tacrolimus so no additional labs are needed.

## 2018-05-12 LAB
MYCOPHENOLATE SERPL LC/MS/MS-MCNC: 1.31 MG/L (ref 1–3.5)
MYCOPHENOLATE-G SERPL LC/MS/MS-MCNC: 164.8 MG/L (ref 30–95)
TME LAST DOSE: 2100 H

## 2018-05-14 ENCOUNTER — TELEPHONE (OUTPATIENT)
Dept: TRANSPLANT | Facility: CLINIC | Age: 73
End: 2018-05-14

## 2018-05-14 NOTE — TELEPHONE ENCOUNTER
ISSUE:  Tacrolimus level 8.5.  Goal 4-5  Dose 0.5 mg BID    OUTCOME:   Attempted to call pt re tacro level.  Unable to reach pt, left detailed message letting pt know we will not want to make any dose changes at this time.  Pt to come in 1-2 weeks for a redraw.  Pt also asked to call back to let confirm her dosing along with letting us know where she would like her lab order sent.      PLAN:   Will wait for pt to return call for lab order to be sent.

## 2018-05-14 NOTE — TELEPHONE ENCOUNTER
Patient Call: General      Reason for call: Patient returned call from Coordinator but stated she did not get voicemail. I read the message the Coordinator left for her. She also stated the lab orders can be sent to [facility] in [place].    Call back needed? No

## 2018-05-21 ENCOUNTER — OFFICE VISIT (OUTPATIENT)
Dept: NEPHROLOGY | Facility: CLINIC | Age: 73
End: 2018-05-21
Attending: INTERNAL MEDICINE
Payer: COMMERCIAL

## 2018-05-21 VITALS
SYSTOLIC BLOOD PRESSURE: 136 MMHG | OXYGEN SATURATION: 98 % | BODY MASS INDEX: 26.37 KG/M2 | WEIGHT: 153.6 LBS | TEMPERATURE: 97.9 F | HEART RATE: 84 BPM | DIASTOLIC BLOOD PRESSURE: 72 MMHG

## 2018-05-21 DIAGNOSIS — D84.9 IMMUNOSUPPRESSION (H): ICD-10-CM

## 2018-05-21 DIAGNOSIS — Z48.298 AFTERCARE FOLLOWING ORGAN TRANSPLANT: Primary | ICD-10-CM

## 2018-05-21 DIAGNOSIS — Z94.0 KIDNEY REPLACED BY TRANSPLANT: ICD-10-CM

## 2018-05-21 DIAGNOSIS — R07.89 OTHER CHEST PAIN: ICD-10-CM

## 2018-05-21 PROCEDURE — G0463 HOSPITAL OUTPT CLINIC VISIT: HCPCS | Mod: ZF

## 2018-05-21 ASSESSMENT — PAIN SCALES - GENERAL: PAINLEVEL: NO PAIN (0)

## 2018-05-21 NOTE — PROGRESS NOTES
Assessment and Plan:  1. LDKT - 2003, new baseline 2-2.5 mg/dL which remains stable to slightly up   2. Immunosuppression:  MPA and prograf goal 4-6 ug/L, well tolerated   3. HTN: acceptable BP,  Now euvolemic on bumex 1 mg daily   4. CAD: LHC was done and showed obstructive CAD only amenable to medical intervention, largely asymptomatic but recent noted 5 min chest tightness that resolved spontaneously. Will refer back to cardiology   5. DM: well controlled, Hg A1c in 6.6 range, following with endocrinology  6. Skin cancer: no recurrence follows with dermatology  7. Dyslipidemia: continues with statin will switch to crestor 5 mg Will continue with monthly labs    8. CKD 4 but stable   9. Anemia of CKD stable HGB   10. MICHELA: PTH is appropriate will need repeat and her vitamin D was replete last checked   11. Anemia of CKD will refer to anemia clinic    12. Low BK grade viremia no need to check further as it has been stable for several months   13. Diarrhea resolved   14. Persistent sinusitis currently on Biaxin, reduced tacrolimus and will recheck levels   15. Volume overload, likely related to elevated tacrolimus vs others. Sliding scale diuretics (SSD) based on weight   Assessment and plan was discussed with patient and she voiced her understanding and agreement.      Reason for Visit:  Ms. Cunha is here for kidney transplant follow up.     HPI:   Dixie Cunha is a 72 year old year old female with PMH significant for DM (well controled), complicated by CAD s/p GABG 2003, that failed ( as per patient her vein were to small), now with PHT, RHF, diabetic nephropathy s/p LDKT in 2003 from her sister, with baseline Cr was 1.4-1.5, until 8/12/16. She was found with a Cr of 3.5 during her routine f/u with transplant nephrology. Her immunosuppression had been Rapamune and Tacrolimus. 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 Her immunosuppression  was switched to Tac/MPA with good tolerance.    Since she was seen last, she has been feeling really well except for sinusitis. She is on her second course of abx. Her tacrolimus was elevated on last check. She is currently on Biaxin. She is improving.          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    Active Medical Problems:  Patient Active Problem List   Diagnosis     Diabetes (H)     Kidney replaced by transplant     CAD (coronary artery disease)     S/P CABG (coronary artery bypass graft)     Skin cancer     Secondary oxalosis (H)     Hypertension     Aftercare following organ transplant     Immunosuppression (H)       Personal Hx:  Social History     Social History     Marital status:      Spouse name: N/A     Number of children: N/A     Years of education: N/A     Occupational History     Not on file.     Social History Main Topics     Smoking status: Former Smoker     Smokeless tobacco: Not on file     Alcohol use Yes     Drug use: No     Sexual activity: Not on file     Other Topics Concern     Not on file     Social History Narrative       Allergies:  Allergies   Allergen Reactions     Gabapentin Swelling       Medications:    Current Outpatient Prescriptions on File Prior to Visit:  acetaminophen (TYLENOL) 325 MG tablet Take 325 mg by mouth as needed    ASPIRIN PO Take 81 mg by mouth daily   blood glucose (ONE TOUCH ULTRA) test strip Use to test blood glucose 10 times daily   blood glucose monitoring (ONE TOUCH ULTRASOFT) lancets Dispense item covered by pt ins. E11.9 IDDM type II - Test 4 times/day. Reason: Insulin Pump   bumetanide (BUMEX) 1 MG tablet Take 1 tablet (1 mg) by mouth daily    Calcium Acetate, Phos Binder, 667 MG CAPS Take 667 mg by mouth 3 times daily (with meals)   glucagon (GLUCAGON EMERGENCY) 1 MG injection Inject 1 mg subcutaneous. As  idrected  Indications: HYPOGLYCEMIC DISORDER   insulin lispro (HUMALOG) 100 UNIT/ML VIAL Inject 100 Units Subcutaneous 3 times daily (before meals)   magnesium oxide (MAG-OX) 400 (241.3 MG) MG tablet Take 1 tablet (400 mg) by mouth daily   metoclopramide (REGLAN) 10 MG tablet Take 10 mg by mouth as needed    metoprolol (LOPRESSOR) 25 MG tablet Take 1 tablet (25 mg) by mouth 2 times daily   mycophenolic acid (GENERIC EQUIVALENT) 360 MG EC tablet Take 1 tablet (360 mg) by mouth 2 times daily   nitroglycerin (NITROSTAT) 0.4 MG SL tablet Place 0.4 mg under the tongue   Omega-3 Fatty Acids (FISH OIL) 1000 MG CPDR Take  by mouth. One capsule twice daily.  Indications: Arteriosclerotic Heart Disease   omeprazole (PRILOSEC) 20 MG capsule Take 20 mg by mouth   penicillin V (VEETID) 250 mg/5 mL suspension Take 160-800 mg by mouth 3 TAB BEFORE AND 3 TAB AFTER DENTAL PROCEDURE   rosuvastatin (CRESTOR) 5 MG tablet Take 1 tablet (5 mg) by mouth daily   Sodium Polystyrene Sulfonate (KAYEXALATE) POWD Take 30 mls by mouth, Mix with water to equal 30 ml. Only take if Potassium level >5.5 (Patient not taking: Reported on 11/27/2017)   sulfamethoxazole-trimethoprim (BACTRIM) 400-80 MG per tablet Take 1 tablet by mouth Every Mon, Wed, Fri Morning   tacrolimus (GENERIC EQUIVALENT) 0.5 MG capsule Take 1 capsule (0.5 mg) by mouth 2 times daily     No current facility-administered medications on file prior to visit.     Vitals:  /72 (BP Location: Right arm)  Pulse 84  Temp 97.9  F (36.6  C) (Oral)  Wt 69.7 kg (153 lb 9.6 oz)  SpO2 98%  BMI 26.37 kg/m2    Exam:   HENT: mouth without ulcers or lesions  LYMPHATICS: no cervical or supraclavicular nodes  RESP: lungs clear to auscultation - no rales, rhonchi or wheezes  CV: regular rhythm, normal rate, no rub, no  murmur  EDEMA: no LE edema bilateral  ABDOMEN: soft, nondistended, nontender, bowel sounds normal  MS: extremities normal - no gross deformities noted, no evidence of inflammation in joints, no muscle tenderness  SKIN: no rash    Results:   Reviewed

## 2018-05-21 NOTE — MR AVS SNAPSHOT
After Visit Summary   5/21/2018    Dixie Cunha    MRN: [id number]           Patient Information     Date Of Birth          1945        Visit Information        Provider Department      5/21/2018 1:35 PM Jean Pierre Navarrete MD [facility] Nephrology        Today's Diagnoses     Kidney replaced by transplant    -  1    Other chest pain           Follow-ups after your visit        Additional Services     CARDIOLOGY EVAL ADULT REFERRAL       Preferred location:  At the [facility] [place]    Please be aware that coverage of these services is subject to the terms and limitations of your health insurance plan.  Call member services at your health plan with any benefit or coverage questions.      Please bring the following to your appointment:  Any x-rays, CTs or MRIs which have been performed. Contact the facility where they were done to arrange for  prior to your scheduled appointment.    List of current medications  This referral request   Any documents/labs given to you for this referral                  Your next 10 appointments already scheduled     May 25, 2018 11:00 AM CDT   (Arrive by 10:45 AM)   Return Visit with Luke Valdez MD   [facility] ([facility])    [address] [place] [address]  [address]  [facility] [id number] [phone]            Aug 23, 2018  1:30 PM CDT   (Arrive by 1:00 PM)   Return Kidney Transplant with  Kidney/Pancreas Recipient   [facility] Nephrology ([facility])    [address] [place] [address]  [address]  [facility] [id number] [phone]              Future tests that were ordered for you today     Open Future Orders        Priority Expected Expires Ordered    CARDIOLOGY EVAL ADULT REFERRAL ASAP  5/29/2018 5/21/2018    Tacrolimus level Routine  5/21/2019 5/21/2018    T cell subset extended profile Routine  5/21/2019 5/21/2018            Who to contact     If you have questions or need follow up information about  today's clinic visit or your schedule please contact [facility] NEPHROLOGY directly at [phone].  Normal or non-critical lab and imaging results will be communicated to you by MyChart, letter or phone within 4 business days after the clinic has received the results. If you do not hear from us within 7 days, please contact the clinic through [employer] or phone. If you have a critical or abnormal lab result, we will notify you by phone as soon as possible.  Submit refill requests through [employer] or call your pharmacy and they will forward the refill request to us. Please allow 3 business days for your refill to be completed.          Additional Information About Your Visit        [employer] Information     [employer] gives you secure access to your electronic health record. If you see a primary care provider, you can also send messages to your care team and make appointments. If you have questions, please call your primary care clinic.  If you do not have a primary care provider, please call [phone] and they will assist you.        Care EveryWhere ID     This is your Care EveryWhere ID. This could be used by other organizations to access your [facility] medical records  [id number]        Your Vitals Were     Pulse Temperature Pulse Oximetry BMI (Body Mass Index)          84 97.9  F (36.6  C) (Oral) 98% 26.37 kg/m2         Blood Pressure from Last 3 Encounters:   05/21/18 136/72   11/27/17 123/74   09/18/17 123/82    Weight from Last 3 Encounters:   05/21/18 69.7 kg (153 lb 9.6 oz)   11/27/17 75.2 kg (165 lb 12.8 oz)   09/18/17 75.7 kg (166 lb 12.8 oz)                 Today's Medication Changes          These changes are accurate as of 5/21/18  2:17 PM.  If you have any questions, ask your nurse or doctor.               Stop taking these medicines if you haven't already. Please contact your care team if you have questions.     omeprazole 20 MG CR capsule   Commonly known as:  priLOSEC                    Primary Care  Provider Office Phone # Fax #    Tano Koch [phone]       [facility] [address] [address]        Equal Access to Services     SRINIVAS MCGARRY : Hadberyl steven ku cartero Sodeidraali, waaxda luqadaha, qaybta kaalmada aderogeryada, shell youngnora hager. So [facility] [phone].    ATENCIÓN: Si habla español, tiene a zapata disposición servicios gratuitos de asistencia lingüística. Llame al [phone].    We comply with applicable federal civil rights laws and [place] laws. We do not discriminate on the basis of race, color, national origin, age, disability, sex, sexual orientation, or gender identity.            Thank you!     Thank you for choosing [facility] NEPHROLOGY  for your care. Our goal is always to provide you with excellent care. Hearing back from our patients is one way we can continue to improve our services. Please take a few minutes to complete the written survey that you may receive in the mail after your visit with us. Thank you!             Your Updated Medication List - Protect others around you: Learn how to safely use, store and throw away your medicines at www.disposemymeds.org.          This list is accurate as of 5/21/18  2:17 PM.  Always use your most recent med list.                   Brand Name Dispense Instructions for use Diagnosis    acetaminophen 325 MG tablet    TYLENOL     Take 325 mg by mouth as needed        ASPIRIN PO      Take 81 mg by mouth daily        blood glucose monitoring lancets      Dispense item covered by pt ins. E11.9 IDDM type II - Test 4 times/day. Reason: Insulin Pump        bumetanide 1 MG tablet    BUMEX    90 tablet    Take 1 tablet (1 mg) by mouth daily    Edema, unspecified type       Calcium Acetate (Phos Binder) 667 MG Caps     270 capsule    Take 667 mg by mouth 3 times daily (with meals)    Kidney transplanted, Complications, kidney transplant       CLARITHROMYCIN PO      Take 500 mg by mouth  2 times daily        Fish Oil 1000 MG Cpdr      Take  by mouth. One capsule twice daily.  Indications: Arteriosclerotic Heart Disease        GLUCAGON EMERGENCY 1 MG kit   Generic drug:  glucagon      Inject 1 mg subcutaneous. As  idrected  Indications: HYPOGLYCEMIC DISORDER        insulin lispro 100 UNIT/ML injection    HumaLOG     Inject 100 Units Subcutaneous 3 times daily (before meals)        KAYEXALATE Powd     453 g    Take 30 mls by mouth, Mix with water to equal 30 ml. Only take if Potassium level >5.5    Hyperkalemia       magnesium oxide 400 (241.3 Mg) MG tablet    MAG-OX    90 tablet    Take 1 tablet (400 mg) by mouth daily    Kidney transplanted, Aftercare following organ transplant, Complication of transplanted kidney, unspecified complication       metoprolol tartrate 25 MG tablet    LOPRESSOR    180 tablet    Take 1 tablet (25 mg) by mouth 2 times daily    Renal hypertension, stage 1-4 or unspecified chronic kidney disease       mycophenolic acid 360 MG EC tablet    GENERIC EQUIVALENT    180 tablet    Take 1 tablet (360 mg) by mouth 2 times daily    Kidney replaced by transplant       nitroGLYcerin 0.4 MG sublingual tablet    NITROSTAT     Place 0.4 mg under the tongue        ONETOUCH ULTRA test strip   Generic drug:  blood glucose monitoring      Use to test blood glucose 10 times daily        penicillin V 250 mg/5 mL suspension    VEETID     Take 160-800 mg by mouth 3 TAB BEFORE AND 3 TAB AFTER DENTAL PROCEDURE        REGLAN 10 MG tablet   Generic drug:  metoclopramide      Take 10 mg by mouth as needed        rosuvastatin 5 MG tablet    CRESTOR    90 tablet    Take 1 tablet (5 mg) by mouth daily    Hyperlipidemia, unspecified hyperlipidemia type       sulfamethoxazole-trimethoprim 400-80 MG per tablet    BACTRIM    45 tablet    Take 1 tablet by mouth Every Mon, Wed, Fri Morning    Immunosuppression (H)       tacrolimus 0.5 MG capsule    GENERIC EQUIVALENT    60 capsule    Take 1 capsule (0.5 mg) by  mouth 2 times daily    Immunosuppression (H)

## 2018-05-22 DIAGNOSIS — D84.9 IMMUNOSUPPRESSION (H): ICD-10-CM

## 2018-05-22 RX ORDER — TACROLIMUS 0.5 MG/1
0.5 CAPSULE ORAL 2 TIMES DAILY
Qty: 60 CAPSULE | Refills: 11 | Status: SHIPPED | OUTPATIENT
Start: 2018-05-22 | End: 2018-11-23

## 2018-05-24 ENCOUNTER — EXTERNAL ORDER RESULTS (OUTPATIENT)
Dept: CARE COORDINATION | Facility: CLINIC | Age: 73
End: 2018-05-24

## 2018-05-24 DIAGNOSIS — Z94.0 KIDNEY REPLACED BY TRANSPLANT: ICD-10-CM

## 2018-05-24 PROCEDURE — 36415 COLL VENOUS BLD VENIPUNCTURE: CPT | Performed by: INTERNAL MEDICINE

## 2018-05-24 PROCEDURE — 80197 ASSAY OF TACROLIMUS: CPT | Performed by: INTERNAL MEDICINE

## 2018-05-25 ENCOUNTER — TELEPHONE (OUTPATIENT)
Dept: TRANSPLANT | Facility: CLINIC | Age: 73
End: 2018-05-25

## 2018-05-25 DIAGNOSIS — Z94.0 KIDNEY REPLACED BY TRANSPLANT: Primary | ICD-10-CM

## 2018-05-25 LAB
TACROLIMUS BLD-MCNC: 13.7 UG/L (ref 5–15)
TME LAST DOSE: NORMAL H

## 2018-05-25 NOTE — TELEPHONE ENCOUNTER
Patient returned call to confirm current dose and accurate trough level. Patient denies taking medication prior to her lab draw. Patient states that she was taking clarithromycin h\e stopped taking it d\t it left a bad taste in her mouth and increased itching. Patient will continue taking tacrolimus 0.5 mg BID and repeat level in one week. Order placed.

## 2018-05-25 NOTE — TELEPHONE ENCOUNTER
ISSUE:  Tacrolimus level 13.7. Goal 4-5    PLAN:   Please confirm accurate trough level and pt is taking tacro 0.5 mg BID. See if pt still taking Biaxin(clarithromycin), if so per Dr Navarrete, pt to decrease her dose to 0.5 mg QD and once done pt to start tacro 1 mg in AM and 0.5 mg in PM. Pt to repeat labs next week and then again 5 days after she increases her tacrolimus dose.       Jean Pierre Navarrete MD Etcheverry, Katherine, RN                   She is currently on biaxin   I tried to call her but no answer   If she is still on biaxin just change her dose to 0.5 mg daily and once she is done she goes back to 1/0.5 am/pm and get a level   Thanks   SR

## 2018-05-26 ENCOUNTER — OFFICE VISIT (OUTPATIENT)
Dept: CARDIOLOGY | Facility: CLINIC | Age: 73
End: 2018-05-26
Attending: INTERNAL MEDICINE
Payer: COMMERCIAL

## 2018-05-26 VITALS
HEIGHT: 64 IN | WEIGHT: 151.6 LBS | SYSTOLIC BLOOD PRESSURE: 163 MMHG | BODY MASS INDEX: 25.88 KG/M2 | DIASTOLIC BLOOD PRESSURE: 73 MMHG | OXYGEN SATURATION: 98 % | HEART RATE: 78 BPM

## 2018-05-26 DIAGNOSIS — R07.89 ATYPICAL CHEST PAIN: Primary | ICD-10-CM

## 2018-05-26 DIAGNOSIS — Z95.1 POSTSURGICAL AORTOCORONARY BYPASS STATUS: ICD-10-CM

## 2018-05-26 DIAGNOSIS — N18.4 CKD (CHRONIC KIDNEY DISEASE) STAGE 4, GFR 15-29 ML/MIN (H): ICD-10-CM

## 2018-05-26 PROCEDURE — G0463 HOSPITAL OUTPT CLINIC VISIT: HCPCS | Mod: ZF

## 2018-05-26 PROCEDURE — 99214 OFFICE O/P EST MOD 30 MIN: CPT | Mod: ZP | Performed by: INTERNAL MEDICINE

## 2018-05-26 ASSESSMENT — PAIN SCALES - GENERAL: PAINLEVEL: NO PAIN (0)

## 2018-05-26 NOTE — NURSING NOTE
Chief Complaint   Patient presents with     Follow Up For     chest pain      Vitals were taken and medications were reconciled.     Mracella Tarango RMA  10:19 AM

## 2018-05-26 NOTE — LETTER
5/26/2018      RE: Dixie Cunha  [address]  [facility] [address]       Dear Colleague,    Thank you for the opportunity to participate in the care of your patient, Dixie Cunha, at the [facility] at [facility]. Please see a copy of my visit note below.       SUBJECTIVE:  Ms. Cunha is a 73 yo female who has a h/o DM type I, CKD, s/p living donor kidney tx in 2003, h/o CAD, s/p CABG in 2004 (SVG-LAD, SVG-OM, SVG-PDA). The patient had a repeat coronary angiogram a year after bypass, which showed occluded bypass grafts, severe RCA and distal Cx disease and non-obstructive disease in LAD. Attempt to PCI the RCA was made, but was unsuccessful and RCA was treated with POBA at that time. The patient states that she never had any chest pains and all her disease was found during her kidney transplant evaluation.   Had repear angiogram 2016. No intervention.Had PAP in 602.  Now had 2 episodes of CP lasting a minute to 5 minutes at rest with no associated symptoms. Patient active,play golf with no symptoms.  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  Current Outpatient Prescriptions   Medication Sig     acetaminophen (TYLENOL) 325 MG tablet Take 325 mg by mouth as needed      ASPIRIN PO Take 81 mg by mouth daily     blood glucose (ONE TOUCH ULTRA) test strip Use to test blood glucose 10 times daily     blood glucose monitoring (ONE TOUCH ULTRASOFT) lancets  Dispense item covered by pt ins. E11.9 IDDM type II - Test 4 times/day. Reason: Insulin Pump     bumetanide (BUMEX) 1 MG tablet Take 1 tablet (1 mg) by mouth daily     Calcium Acetate, Phos Binder, 667 MG CAPS Take 667 mg by mouth 3 times daily (with meals)     insulin lispro (HUMALOG) 100 UNIT/ML VIAL Inject 100 Units Subcutaneous 3 times daily (before meals)     magnesium oxide (MAG-OX) 400 (241.3 MG) MG tablet Take 1 tablet (400 mg) by mouth daily     metoclopramide (REGLAN) 10 MG tablet Take 10 mg by mouth as needed      metoprolol (LOPRESSOR) 25 MG tablet Take 1 tablet (25 mg) by mouth 2 times daily     mycophenolic acid (GENERIC EQUIVALENT) 360 MG EC tablet Take 1 tablet (360 mg) by mouth 2 times daily     nitroglycerin (NITROSTAT) 0.4 MG SL tablet Place 0.4 mg under the tongue     Omega-3 Fatty Acids (FISH OIL) 1000 MG CPDR Take  by mouth. One capsule twice daily.  Indications: Arteriosclerotic Heart Disease     penicillin V (VEETID) 250 mg/5 mL suspension Take 160-800 mg by mouth 3 TAB BEFORE AND 3 TAB AFTER DENTAL PROCEDURE     rosuvastatin (CRESTOR) 5 MG tablet Take 1 tablet (5 mg) by mouth daily     tacrolimus (GENERIC EQUIVALENT) 0.5 MG capsule Take 1 capsule (0.5 mg) by mouth 2 times daily     CLARITHROMYCIN PO Take 500 mg by mouth 2 times daily     glucagon (GLUCAGON EMERGENCY) 1 MG injection Inject 1 mg subcutaneous. As  idrected  Indications: HYPOGLYCEMIC DISORDER     Sodium Polystyrene Sulfonate (KAYEXALATE) POWD Take 30 mls by mouth, Mix with water to equal 30 ml. Only take if Potassium level >5.5 (Patient not taking: Reported on 11/27/2017)     sulfamethoxazole-trimethoprim (BACTRIM) 400-80 MG per tablet Take 1 tablet by mouth Every Mon, Wed, Fri Morning (Patient not taking: Reported on 5/21/2018)     No current facility-administered medications for this visit.      Past Medical History:   Diagnosis Date     CAD (coronary artery disease)      Carcinoma in situ of breast     mastectomy     Diabetes  "(H)      S/P CABG (coronary artery bypass graft)      S/P kidney transplant      Skin cancer      Past Surgical History:   Procedure Laterality Date     MASTECTOMY      left     PERCUTANEOUS BIOPSY KIDNEY Right 9/16/2016    Procedure: PERCUTANEOUS BIOPSY KIDNEY;  Surgeon: Kareem White MD;  Location: [facility]     Allergies   Allergen Reactions     Gabapentin Swelling     Social History     Social History     Marital status:      Spouse name: N/A     Number of children: N/A     Years of education: N/A     Occupational History     Not on file.     Social History Main Topics     Smoking status: Former Smoker     Smokeless tobacco: Never Used     Alcohol use Yes     Drug use: No     Sexual activity: Not on file     Other Topics Concern     Not on file     Social History Narrative     No family history on file.       REVIEW OF SYSTEMS:  General: negative, fever, chills, night sweats  Skin: negative, acne, rash and scaling  Eyes: negative, double vision, eye pain and photophobia  Ears/Nose/Throat: negative, nasal congestion and purulent rhinorrhea  Respiratory: No dyspnea on exertion, No cough, No hemoptysis and negative  Cardiovascular: negative, palpitations, tachycardia, irregular heart beat, exertional chest pain or pressure, paroxysmal nocturnal dyspnea, dyspnea on exertion and orthopnea         OBJECTIVE:  Blood pressure 163/73, pulse 78, height 1.626 m (5' 4\"), weight 68.8 kg (151 lb 9.6 oz), SpO2 98 %.  General Appearance: alert, active and no distress  Head: Normocephalic. No masses, lesions, tenderness or abnormalities  Eyes: conjuctiva clear, PERRL, EOM intact  Ears: External ears normal. Canals clear. TM's normal.  Nose: Nares normal  Mouth: normal  Neck: Supple, no cervical adenopathy, no thyromegaly  Lungs: clear to auscultation  Cardiac: regular rate and rhythm, normal S1 and S2, no murmur         ASSESSMENT/PLAN:  Patient with 2 episodes of chest pain at rest.  Known CAD, S/P CABG " 2004,occludrd grafts,repeat angiogram 12/2016,severe diffuse disease,diabetic vessels,not suitable for intervention.  Type I DM,S/P Kidney Tx. Currently CKD stage 4.  Overall atypical symptoms.  Suggested long acting nitrate,patient do not want and stated it did not bother me.  Suggested F/U due to PHTN and CAD. She states will return on a PRN basis.  Per orders.   Return to Clinic  PRN.    Please do not hesitate to contact me if you have any questions/concerns.     Sincerely,     BLANCA Jara MD

## 2018-05-26 NOTE — MR AVS SNAPSHOT
After Visit Summary   5/26/2018    Dixie Cunha    MRN: [id number]           Patient Information     Date Of Birth          1945        Visit Information        Provider Department      5/26/2018 10:30 AM BLANCA Jara MD [facility]        Today's Diagnoses     Atypical chest pain    -  1    Postsurgical aortocoronary bypass status        CKD (chronic kidney disease) stage 4, GFR 15-29 ml/min (H)           Follow-ups after your visit        Follow-up notes from your care team     Return if symptoms worsen or fail to improve.      Your next 10 appointments already scheduled     Jun 11, 2018  9:00 AM CDT   [facility]   [facility]    [address] [place] [id number] [phone]           Please do not eat 10-12 hours before your appointment if you are coming in fasting for labs on lipids, cholesterol, or glucose (sugar). This does not apply to pregnant women. Water, hot tea and black coffee (with nothing added) are okay. Do not drink other fluids, diet soda or chew gum.            Aug 23, 2018  1:30 PM CDT   (Arrive by 1:00 PM)   Return Kidney Transplant with  Kidney/Pancreas Recipient   [facility] ([facility])    [address] [facility]  [address] [place] [address] [phone]              Future tests that were ordered for you today     Open Future Orders        Priority Expected Expires Ordered    CBC with platelets differential Routine  6/24/2018 5/25/2018    Basic metabolic panel Routine  6/24/2018 5/25/2018    Tacrolimus level Routine  6/24/2018 5/25/2018            Who to contact     If you have questions or need follow up information about today's clinic visit or your schedule please contact [facility] directly at [phone].  Normal or non-critical lab and imaging results will be communicated to you by MyChart, letter or phone within 4  "business days after the clinic has received the results. If you do not hear from us within 7 days, please contact the clinic through [email or web address] or phone. If you have a critical or abnormal lab result, we will notify you by phone as soon as possible.  Submit refill requests through [email or web address] or call your pharmacy and they will forward the refill request to us. Please allow 3 business days for your refill to be completed.          Additional Information About Your Visit        [email or web address] Information     [email or web address] gives you secure access to your electronic health record. If you see a primary care provider, you can also send messages to your care team and make appointments. If you have questions, please call your primary care clinic.  If you do not have a primary care provider, please call [phone] and they will assist you.        Care EveryWhere ID     This is your Care EveryWhere ID. This could be used by other organizations to access your [place] medical records  [id number]        Your Vitals Were     Pulse Height Pulse Oximetry BMI (Body Mass Index)          78 1.626 m (5' 4\") 98% 26.02 kg/m2         Blood Pressure from Last 3 Encounters:   05/26/18 163/73   05/21/18 136/72   11/27/17 123/74    Weight from Last 3 Encounters:   05/26/18 68.8 kg (151 lb 9.6 oz)   05/21/18 69.7 kg (153 lb 9.6 oz)   11/27/17 75.2 kg (165 lb 12.8 oz)              Today, you had the following     No orders found for display       Primary Care Provider Office Phone # Fax #    Tano Koch [phone]       [facility] [address] [place] [phone]        Equal Access to Services     SRINIVAS MCGARRY : Hadii steven Boyd, julio cda clifford, qaybcarlos tuckeralshell dimas. So [facility] [phone].    ATENCIÓN: Si habla español, tiene a zapata disposición servicios gratuitos de asistencia lingüística. Llame al [phone].    We comply with applicable federal " civil rights laws and [place] laws. We do not discriminate on the basis of race, color, national origin, age, disability, sex, sexual orientation, or gender identity.            Thank you!     Thank you for choosing [facility]  for your care. Our goal is always to provide you with excellent care. Hearing back from our patients is one way we can continue to improve our services. Please take a few minutes to complete the written survey that you may receive in the mail after your visit with us. Thank you!             Your Updated Medication List - Protect others around you: Learn how to safely use, store and throw away your medicines at www.disposemymeds.org.          This list is accurate as of 5/26/18 10:53 AM.  Always use your most recent med list.                   Brand Name Dispense Instructions for use Diagnosis    acetaminophen 325 MG tablet    TYLENOL     Take 325 mg by mouth as needed        ASPIRIN PO      Take 81 mg by mouth daily        blood glucose monitoring lancets      Dispense item covered by pt ins. E11.9 IDDM type II - Test 4 times/day. Reason: Insulin Pump        bumetanide 1 MG tablet    BUMEX    90 tablet    Take 1 tablet (1 mg) by mouth daily    Edema, unspecified type       Calcium Acetate (Phos Binder) 667 MG Caps     270 capsule    Take 667 mg by mouth 3 times daily (with meals)    Kidney transplanted, Complications, kidney transplant       CLARITHROMYCIN PO      Take 500 mg by mouth 2 times daily        Fish Oil 1000 MG Cpdr      Take  by mouth. One capsule twice daily.  Indications: Arteriosclerotic Heart Disease        GLUCAGON EMERGENCY 1 MG kit   Generic drug:  glucagon      Inject 1 mg subcutaneous. As  idrected  Indications: HYPOGLYCEMIC DISORDER        insulin lispro 100 UNIT/ML injection    HumaLOG     Inject 100 Units Subcutaneous 3 times daily (before meals)        KAYEXALATE Powd     453 g    Take 30 mls by mouth, Mix with water to equal 30 ml. Only take if Potassium  level >5.5    Hyperkalemia       magnesium oxide 400 (241.3 Mg) MG tablet    MAG-OX    90 tablet    Take 1 tablet (400 mg) by mouth daily    Kidney transplanted, Aftercare following organ transplant, Complication of transplanted kidney, unspecified complication       metoprolol tartrate 25 MG tablet    LOPRESSOR    180 tablet    Take 1 tablet (25 mg) by mouth 2 times daily    Renal hypertension, stage 1-4 or unspecified chronic kidney disease       mycophenolic acid 360 MG EC tablet    GENERIC EQUIVALENT    180 tablet    Take 1 tablet (360 mg) by mouth 2 times daily    Kidney replaced by transplant       nitroGLYcerin 0.4 MG sublingual tablet    NITROSTAT     Place 0.4 mg under the tongue        ONETOUCH ULTRA test strip   Generic drug:  blood glucose monitoring      Use to test blood glucose 10 times daily        penicillin V 250 mg/5 mL suspension    VEETID     Take 160-800 mg by mouth 3 TAB BEFORE AND 3 TAB AFTER DENTAL PROCEDURE        REGLAN 10 MG tablet   Generic drug:  metoclopramide      Take 10 mg by mouth as needed        rosuvastatin 5 MG tablet    CRESTOR    90 tablet    Take 1 tablet (5 mg) by mouth daily    Hyperlipidemia, unspecified hyperlipidemia type       sulfamethoxazole-trimethoprim 400-80 MG per tablet    BACTRIM    45 tablet    Take 1 tablet by mouth Every Mon, Wed, Fri Morning    Immunosuppression (H)       tacrolimus 0.5 MG capsule    GENERIC EQUIVALENT    60 capsule    Take 1 capsule (0.5 mg) by mouth 2 times daily    Immunosuppression (H)

## 2018-05-26 NOTE — PROGRESS NOTES
SUBJECTIVE:  Ms. Cunha is a 71 yo female who has a h/o DM type I, CKD, s/p living donor kidney tx in 2003, h/o CAD, s/p CABG in 2004 (SVG-LAD, SVG-OM, SVG-PDA). The patient had a repeat coronary angiogram a year after bypass, which showed occluded bypass grafts, severe RCA and distal Cx disease and non-obstructive disease in LAD. Attempt to PCI the RCA was made, but was unsuccessful and RCA was treated with POBA at that time. The patient states that she never had any chest pains and all her disease was found during her kidney transplant evaluation.   Had repear angiogram 2016. No intervention.Had PAP in 602.  Now had 2 episodes of CP lasting a minute to 5 minutes at rest with no associated symptoms. Patient active,play golf with no symptoms.  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  Current Outpatient Prescriptions   Medication Sig     acetaminophen (TYLENOL) 325 MG tablet Take 325 mg by mouth as needed      ASPIRIN PO Take 81 mg by mouth daily     blood glucose (ONE TOUCH ULTRA) test strip Use to test blood glucose 10 times daily     blood glucose monitoring (ONE TOUCH ULTRASOFT) lancets Dispense item covered by pt ins. E11.9 IDDM type II - Test 4 times/day. Reason: Insulin Pump     bumetanide (BUMEX) 1 MG tablet Take 1 tablet (1 mg) by mouth daily     Calcium Acetate, Phos Binder, 667 MG CAPS Take 667 mg by mouth 3 times daily (with meals)     insulin lispro (HUMALOG) 100 UNIT/ML VIAL Inject 100 Units  Subcutaneous 3 times daily (before meals)     magnesium oxide (MAG-OX) 400 (241.3 MG) MG tablet Take 1 tablet (400 mg) by mouth daily     metoclopramide (REGLAN) 10 MG tablet Take 10 mg by mouth as needed      metoprolol (LOPRESSOR) 25 MG tablet Take 1 tablet (25 mg) by mouth 2 times daily     mycophenolic acid (GENERIC EQUIVALENT) 360 MG EC tablet Take 1 tablet (360 mg) by mouth 2 times daily     nitroglycerin (NITROSTAT) 0.4 MG SL tablet Place 0.4 mg under the tongue     Omega-3 Fatty Acids (FISH OIL) 1000 MG CPDR Take  by mouth. One capsule twice daily.  Indications: Arteriosclerotic Heart Disease     penicillin V (VEETID) 250 mg/5 mL suspension Take 160-800 mg by mouth 3 TAB BEFORE AND 3 TAB AFTER DENTAL PROCEDURE     rosuvastatin (CRESTOR) 5 MG tablet Take 1 tablet (5 mg) by mouth daily     tacrolimus (GENERIC EQUIVALENT) 0.5 MG capsule Take 1 capsule (0.5 mg) by mouth 2 times daily     CLARITHROMYCIN PO Take 500 mg by mouth 2 times daily     glucagon (GLUCAGON EMERGENCY) 1 MG injection Inject 1 mg subcutaneous. As  idrected  Indications: HYPOGLYCEMIC DISORDER     Sodium Polystyrene Sulfonate (KAYEXALATE) POWD Take 30 mls by mouth, Mix with water to equal 30 ml. Only take if Potassium level >5.5 (Patient not taking: Reported on 11/27/2017)     sulfamethoxazole-trimethoprim (BACTRIM) 400-80 MG per tablet Take 1 tablet by mouth Every Mon, Wed, Fri Morning (Patient not taking: Reported on 5/21/2018)     No current facility-administered medications for this visit.      Past Medical History:   Diagnosis Date     CAD (coronary artery disease)      Carcinoma in situ of breast     mastectomy     Diabetes (H)      S/P CABG (coronary artery bypass graft)      S/P kidney transplant      Skin cancer      Past Surgical History:   Procedure Laterality Date     MASTECTOMY      left     PERCUTANEOUS BIOPSY KIDNEY Right 9/16/2016    Procedure: PERCUTANEOUS BIOPSY KIDNEY;  Surgeon: Kareem White MD;  Location:  OR  "    Allergies   Allergen Reactions     Gabapentin Swelling     Social History     Social History     Marital status:      Spouse name: N/A     Number of children: N/A     Years of education: N/A     Occupational History     Not on file.     Social History Main Topics     Smoking status: Former Smoker     Smokeless tobacco: Never Used     Alcohol use Yes     Drug use: No     Sexual activity: Not on file     Other Topics Concern     Not on file     Social History Narrative     No family history on file.       REVIEW OF SYSTEMS:  General: negative, fever, chills, night sweats  Skin: negative, acne, rash and scaling  Eyes: negative, double vision, eye pain and photophobia  Ears/Nose/Throat: negative, nasal congestion and purulent rhinorrhea  Respiratory: No dyspnea on exertion, No cough, No hemoptysis and negative  Cardiovascular: negative, palpitations, tachycardia, irregular heart beat, exertional chest pain or pressure, paroxysmal nocturnal dyspnea, dyspnea on exertion and orthopnea         OBJECTIVE:  Blood pressure 163/73, pulse 78, height 1.626 m (5' 4\"), weight 68.8 kg (151 lb 9.6 oz), SpO2 98 %.  General Appearance: alert, active and no distress  Head: Normocephalic. No masses, lesions, tenderness or abnormalities  Eyes: conjuctiva clear, PERRL, EOM intact  Ears: External ears normal. Canals clear. TM's normal.  Nose: Nares normal  Mouth: normal  Neck: Supple, no cervical adenopathy, no thyromegaly  Lungs: clear to auscultation  Cardiac: regular rate and rhythm, normal S1 and S2, no murmur         ASSESSMENT/PLAN:  Patient with 2 episodes of chest pain at rest.  Known CAD, S/P CABG 2004,occludrd grafts,repeat angiogram 12/2016,severe diffuse disease,diabetic vessels,not suitable for intervention.  Type I DM,S/P Kidney Tx. Currently CKD stage 4.  Overall atypical symptoms.  Suggested long acting nitrate,patient do not want and stated it did not bother me.  Suggested F/U due to PHTN and CAD. She states will " return on a PRN basis.  Per orders.   Return to Clinic  PRN.

## 2018-06-06 ENCOUNTER — TELEPHONE (OUTPATIENT)
Dept: NEPHROLOGY | Facility: CLINIC | Age: 73
End: 2018-06-06

## 2018-06-06 DIAGNOSIS — Z94.0 KIDNEY TRANSPLANTED: ICD-10-CM

## 2018-06-06 DIAGNOSIS — T86.10 COMPLICATIONS, KIDNEY TRANSPLANT: ICD-10-CM

## 2018-06-06 RX ORDER — CALCIUM ACETATE 667 MG/1
667 CAPSULE ORAL
Qty: 270 CAPSULE | Refills: 3 | Status: SHIPPED | OUTPATIENT
Start: 2018-06-06 | End: 2019-06-26

## 2018-06-06 NOTE — TELEPHONE ENCOUNTER
Last Office Visit with Nephrologist:  5/21/18.  Medication refilled per Nephrology Clinic protocol.     Debbie Olsen RN

## 2018-06-06 NOTE — TELEPHONE ENCOUNTER
[facility] Call Center    Phone Message    May a detailed message be left on voicemail: yes    Reason for Call: Medication Refill Request    Has the patient contacted the pharmacy for the refill? Yes   Name of medication being requested: Refill of RX: Calcium Acetate, Phos Binder, 667 MG CAPS.    Provider who prescribed the medication: Jean Pierre Navarrete  Pharmacy: [facility]  Date medication is needed: ASAP      Action Taken: Other: [facility]

## 2018-06-11 DIAGNOSIS — Z94.0 KIDNEY REPLACED BY TRANSPLANT: ICD-10-CM

## 2018-06-11 LAB
BASOPHILS # BLD AUTO: 0 10E9/L (ref 0–0.2)
BASOPHILS NFR BLD AUTO: 0.5 %
CD19 CELLS # BLD: 52 CELLS/UL (ref 107–698)
CD19 CELLS NFR BLD: 16 % (ref 6–27)
CD3 CELLS # BLD: 206 CELLS/UL (ref 603–2990)
CD3 CELLS NFR BLD: 62 % (ref 49–84)
CD3+CD4+ CELLS # BLD: 104 CELLS/UL (ref 441–2156)
CD3+CD4+ CELLS NFR BLD: 31 % (ref 28–63)
CD3+CD4+ CELLS/CD3+CD8+ CLL BLD: 0.97 % (ref 1.4–2.6)
CD3+CD8+ CELLS # BLD: 106 CELLS/UL (ref 125–1312)
CD3+CD8+ CELLS NFR BLD: 32 % (ref 10–40)
CD3-CD16+CD56+ CELLS # BLD: 68 CELLS/UL (ref 95–640)
CD3-CD16+CD56+ CELLS NFR BLD: 21 % (ref 4–25)
DIFFERENTIAL METHOD BLD: ABNORMAL
EOSINOPHIL # BLD AUTO: 0.1 10E9/L (ref 0–0.7)
EOSINOPHIL NFR BLD AUTO: 1.8 %
ERYTHROCYTE [DISTWIDTH] IN BLOOD BY AUTOMATED COUNT: 15.5 % (ref 10–15)
HCT VFR BLD AUTO: 37.1 % (ref 35–47)
HGB BLD-MCNC: 10.9 G/DL (ref 11.7–15.7)
IFC SPECIMEN: ABNORMAL
LYMPHOCYTES # BLD AUTO: 0.3 10E9/L (ref 0.8–5.3)
LYMPHOCYTES NFR BLD AUTO: 6.3 %
MCH RBC QN AUTO: 28.9 PG (ref 26.5–33)
MCHC RBC AUTO-ENTMCNC: 29.4 G/DL (ref 31.5–36.5)
MCV RBC AUTO: 98 FL (ref 78–100)
MONOCYTES # BLD AUTO: 0.5 10E9/L (ref 0–1.3)
MONOCYTES NFR BLD AUTO: 10.2 %
NEUTROPHILS # BLD AUTO: 3.6 10E9/L (ref 1.6–8.3)
NEUTROPHILS NFR BLD AUTO: 81.2 %
PLATELET # BLD AUTO: 168 10E9/L (ref 150–450)
RBC # BLD AUTO: 3.77 10E12/L (ref 3.8–5.2)
TACROLIMUS BLD-MCNC: 3.7 UG/L (ref 5–15)
TME LAST DOSE: ABNORMAL H
WBC # BLD AUTO: 4.4 10E9/L (ref 4–11)

## 2018-06-11 PROCEDURE — 86355 B CELLS TOTAL COUNT: CPT | Performed by: INTERNAL MEDICINE

## 2018-06-11 PROCEDURE — 36415 COLL VENOUS BLD VENIPUNCTURE: CPT | Performed by: INTERNAL MEDICINE

## 2018-06-11 PROCEDURE — 86357 NK CELLS TOTAL COUNT: CPT | Performed by: INTERNAL MEDICINE

## 2018-06-11 PROCEDURE — 86359 T CELLS TOTAL COUNT: CPT | Performed by: INTERNAL MEDICINE

## 2018-06-11 PROCEDURE — 80180 DRUG SCRN QUAN MYCOPHENOLATE: CPT | Performed by: INTERNAL MEDICINE

## 2018-06-11 PROCEDURE — 85025 COMPLETE CBC W/AUTO DIFF WBC: CPT | Performed by: INTERNAL MEDICINE

## 2018-06-11 PROCEDURE — 80048 BASIC METABOLIC PNL TOTAL CA: CPT | Performed by: INTERNAL MEDICINE

## 2018-06-11 PROCEDURE — 80197 ASSAY OF TACROLIMUS: CPT | Performed by: INTERNAL MEDICINE

## 2018-06-11 PROCEDURE — 86360 T CELL ABSOLUTE COUNT/RATIO: CPT | Performed by: INTERNAL MEDICINE

## 2018-06-12 LAB
ANION GAP SERPL CALCULATED.3IONS-SCNC: 12 MMOL/L (ref 3–14)
BUN SERPL-MCNC: 39 MG/DL (ref 7–30)
CALCIUM SERPL-MCNC: 8.8 MG/DL (ref 8.5–10.1)
CHLORIDE SERPL-SCNC: 111 MMOL/L (ref 94–109)
CO2 SERPL-SCNC: 19 MMOL/L (ref 20–32)
CREAT SERPL-MCNC: 2.09 MG/DL (ref 0.52–1.04)
GFR SERPL CREATININE-BSD FRML MDRD: 23 ML/MIN/1.7M2
GLUCOSE SERPL-MCNC: 160 MG/DL (ref 70–99)
MYCOPHENOLATE SERPL LC/MS/MS-MCNC: 0.71 MG/L (ref 1–3.5)
MYCOPHENOLATE-G SERPL LC/MS/MS-MCNC: 105.5 MG/L (ref 30–95)
POTASSIUM SERPL-SCNC: 5 MMOL/L (ref 3.4–5.3)
SODIUM SERPL-SCNC: 142 MMOL/L (ref 133–144)
TME LAST DOSE: ABNORMAL H

## 2018-06-14 ENCOUNTER — DOCUMENTATION ONLY (OUTPATIENT)
Dept: TRANSPLANT | Facility: CLINIC | Age: 73
End: 2018-06-14

## 2018-06-14 NOTE — PROGRESS NOTES
Jean Pierre Navarrete MD Etcheverry, Katherine, RN                   I am fine with tac level 3-5

## 2018-07-11 DIAGNOSIS — Z48.298 AFTERCARE FOLLOWING ORGAN TRANSPLANT: ICD-10-CM

## 2018-07-11 DIAGNOSIS — Z94.0 KIDNEY REPLACED BY TRANSPLANT: ICD-10-CM

## 2018-07-11 DIAGNOSIS — Z79.899 ENCOUNTER FOR LONG-TERM CURRENT USE OF MEDICATION: ICD-10-CM

## 2018-07-11 LAB
ANION GAP SERPL CALCULATED.3IONS-SCNC: 11 MMOL/L (ref 3–14)
BUN SERPL-MCNC: 50 MG/DL (ref 7–30)
CALCIUM SERPL-MCNC: 8.6 MG/DL (ref 8.5–10.1)
CHLORIDE SERPL-SCNC: 109 MMOL/L (ref 94–109)
CO2 SERPL-SCNC: 22 MMOL/L (ref 20–32)
CREAT SERPL-MCNC: 2.34 MG/DL (ref 0.52–1.04)
ERYTHROCYTE [DISTWIDTH] IN BLOOD BY AUTOMATED COUNT: 16.7 % (ref 10–15)
GFR SERPL CREATININE-BSD FRML MDRD: 20 ML/MIN/1.7M2
GLUCOSE SERPL-MCNC: 180 MG/DL (ref 70–99)
HCT VFR BLD AUTO: 29.6 % (ref 35–47)
HGB BLD-MCNC: 8.6 G/DL (ref 11.7–15.7)
MCH RBC QN AUTO: 29 PG (ref 26.5–33)
MCHC RBC AUTO-ENTMCNC: 29.1 G/DL (ref 31.5–36.5)
MCV RBC AUTO: 100 FL (ref 78–100)
PLATELET # BLD AUTO: 253 10E9/L (ref 150–450)
POTASSIUM SERPL-SCNC: 5.2 MMOL/L (ref 3.4–5.3)
RBC # BLD AUTO: 2.97 10E12/L (ref 3.8–5.2)
SODIUM SERPL-SCNC: 142 MMOL/L (ref 133–144)
TACROLIMUS BLD-MCNC: 4.8 UG/L (ref 5–15)
TME LAST DOSE: ABNORMAL H
WBC # BLD AUTO: 5.6 10E9/L (ref 4–11)

## 2018-07-11 PROCEDURE — 80048 BASIC METABOLIC PNL TOTAL CA: CPT | Performed by: INTERNAL MEDICINE

## 2018-07-11 PROCEDURE — 36415 COLL VENOUS BLD VENIPUNCTURE: CPT | Performed by: INTERNAL MEDICINE

## 2018-07-11 PROCEDURE — 85027 COMPLETE CBC AUTOMATED: CPT | Performed by: INTERNAL MEDICINE

## 2018-07-11 PROCEDURE — 80197 ASSAY OF TACROLIMUS: CPT | Performed by: INTERNAL MEDICINE

## 2018-07-14 DIAGNOSIS — Z48.298 AFTERCARE FOLLOWING ORGAN TRANSPLANT: ICD-10-CM

## 2018-07-14 DIAGNOSIS — Z94.0 KIDNEY TRANSPLANTED: ICD-10-CM

## 2018-07-14 DIAGNOSIS — T86.10 COMPLICATION OF TRANSPLANTED KIDNEY, UNSPECIFIED COMPLICATION: ICD-10-CM

## 2018-08-15 DIAGNOSIS — Z79.899 ENCOUNTER FOR LONG-TERM CURRENT USE OF MEDICATION: ICD-10-CM

## 2018-08-15 DIAGNOSIS — Z94.0 KIDNEY REPLACED BY TRANSPLANT: ICD-10-CM

## 2018-08-15 DIAGNOSIS — Z48.298 AFTERCARE FOLLOWING ORGAN TRANSPLANT: ICD-10-CM

## 2018-08-15 LAB
ANION GAP SERPL CALCULATED.3IONS-SCNC: 4 MMOL/L (ref 3–14)
BUN SERPL-MCNC: 58 MG/DL (ref 7–30)
CALCIUM SERPL-MCNC: 8.8 MG/DL (ref 8.5–10.1)
CHLORIDE SERPL-SCNC: 110 MMOL/L (ref 94–109)
CO2 SERPL-SCNC: 29 MMOL/L (ref 20–32)
CREAT SERPL-MCNC: 2.15 MG/DL (ref 0.52–1.04)
ERYTHROCYTE [DISTWIDTH] IN BLOOD BY AUTOMATED COUNT: 15.1 % (ref 10–15)
GFR SERPL CREATININE-BSD FRML MDRD: 23 ML/MIN/1.7M2
GLUCOSE SERPL-MCNC: 54 MG/DL (ref 70–99)
HCT VFR BLD AUTO: 33.6 % (ref 35–47)
HGB BLD-MCNC: 10 G/DL (ref 11.7–15.7)
MCH RBC QN AUTO: 28.5 PG (ref 26.5–33)
MCHC RBC AUTO-ENTMCNC: 29.8 G/DL (ref 31.5–36.5)
MCV RBC AUTO: 96 FL (ref 78–100)
PLATELET # BLD AUTO: 238 10E9/L (ref 150–450)
POTASSIUM SERPL-SCNC: 4.5 MMOL/L (ref 3.4–5.3)
RBC # BLD AUTO: 3.51 10E12/L (ref 3.8–5.2)
SODIUM SERPL-SCNC: 143 MMOL/L (ref 133–144)
TACROLIMUS BLD-MCNC: 3.6 UG/L (ref 5–15)
TME LAST DOSE: ABNORMAL H
WBC # BLD AUTO: 5.3 10E9/L (ref 4–11)

## 2018-08-15 PROCEDURE — 36415 COLL VENOUS BLD VENIPUNCTURE: CPT | Performed by: INTERNAL MEDICINE

## 2018-08-15 PROCEDURE — 80197 ASSAY OF TACROLIMUS: CPT | Performed by: INTERNAL MEDICINE

## 2018-08-15 PROCEDURE — 85027 COMPLETE CBC AUTOMATED: CPT | Performed by: INTERNAL MEDICINE

## 2018-08-15 PROCEDURE — 80048 BASIC METABOLIC PNL TOTAL CA: CPT | Performed by: INTERNAL MEDICINE

## 2018-08-23 ENCOUNTER — OFFICE VISIT (OUTPATIENT)
Dept: NEPHROLOGY | Facility: CLINIC | Age: 73
End: 2018-08-23
Attending: INTERNAL MEDICINE
Payer: COMMERCIAL

## 2018-08-23 VITALS
HEART RATE: 73 BPM | SYSTOLIC BLOOD PRESSURE: 138 MMHG | DIASTOLIC BLOOD PRESSURE: 79 MMHG | TEMPERATURE: 98.1 F | OXYGEN SATURATION: 99 % | WEIGHT: 153.8 LBS | BODY MASS INDEX: 26.4 KG/M2

## 2018-08-23 DIAGNOSIS — Z94.0 KIDNEY TRANSPLANTED: ICD-10-CM

## 2018-08-23 DIAGNOSIS — I27.20 PULMONARY HYPERTENSION (H): ICD-10-CM

## 2018-08-23 DIAGNOSIS — Z48.298 AFTERCARE FOLLOWING ORGAN TRANSPLANT: Primary | ICD-10-CM

## 2018-08-23 DIAGNOSIS — T86.10 COMPLICATION OF TRANSPLANTED KIDNEY, UNSPECIFIED COMPLICATION: ICD-10-CM

## 2018-08-23 DIAGNOSIS — R60.9 EDEMA, UNSPECIFIED TYPE: ICD-10-CM

## 2018-08-23 DIAGNOSIS — D64.9 ANEMIA, UNSPECIFIED TYPE: ICD-10-CM

## 2018-08-23 DIAGNOSIS — Z86.2 HISTORY OF ANEMIA DUE TO CKD: ICD-10-CM

## 2018-08-23 DIAGNOSIS — N18.9 HISTORY OF ANEMIA DUE TO CKD: ICD-10-CM

## 2018-08-23 DIAGNOSIS — Z94.0 KIDNEY REPLACED BY TRANSPLANT: ICD-10-CM

## 2018-08-23 DIAGNOSIS — R82.992: ICD-10-CM

## 2018-08-23 DIAGNOSIS — D84.9 IMMUNOSUPPRESSION (H): ICD-10-CM

## 2018-08-23 DIAGNOSIS — I15.1 HYPERTENSION SECONDARY TO OTHER RENAL DISORDERS: ICD-10-CM

## 2018-08-23 PROCEDURE — G0463 HOSPITAL OUTPT CLINIC VISIT: HCPCS | Mod: ZF

## 2018-08-23 RX ORDER — BUMETANIDE 1 MG/1
TABLET ORAL
Qty: 180 TABLET | Refills: 3 | Status: SHIPPED | OUTPATIENT
Start: 2018-08-23 | End: 2018-11-20

## 2018-08-23 RX ORDER — WARFARIN SODIUM 2 MG/1
TABLET ORAL
COMMUNITY
Start: 2018-06-24

## 2018-08-23 ASSESSMENT — PAIN SCALES - GENERAL: PAINLEVEL: NO PAIN (0)

## 2018-08-23 NOTE — LETTER
8/23/2018      RE: Dixie Cunha  [address]  [facility] [address]       TRANSPLANT NEPHROLOGY VISIT    Assessment & Plan   # LDKT: baseline Cr ~ 2.0-2.5; Stable renal function.  Her biggest concern was if her kidney function will improve back to her baseline of 1.5.  This appears unlikely however she does have some hemodynamic fluctuations in her creatinine likely related to volume overload and venous pressure on the kidney in the setting of diuretic use.   - Proteinuria: Minimal     # Immunosuppression: Tacrolimus immediate release (goal  4-6) and Mycophenolate mofetil (goal  1-3.5)   - Changes: No     # Prophylaxis:   - PJP: none, She does of note have a fairly low CD4 count checked in June 2018 at 100.   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 Rejection History:No   CMV IgG Ab Discordance (D+/R-): unknown    EBV IgG Ab Discordance (D+/R-): unknown    History of BK Viremia: Yes    Significant Complications: BK viremia    Transplant Office Phone Number: [phone]    # Hypertension: controlled; Goal BP: 130/80 on bumex, metoprolol   - Changes: Yes - increase the amount of as needed bumetanide and consider addition of metolazone if needed    # Anemia in chronic renal disease: Hgb: Stable   - Iron studies: low, start iron supplementation    # Mineral Bone Disorder:   - Secondary renal hyperparathyroidism: elevated 9/2016 to 214   - Vitamin D: low-normal in 2016   - Calcium: low-normal   - Phosphorus: well controlled on calcium acetate    # Electrolytes:    - Magnesium: within normal on supplementation   - Potassium: within normal limits    # Other Medical Issues:   # CAD: LHC was done and showed obstructive CAD  only amenable to medical intervention, largely asymptomatic but recent noted 5 min chest tightness that resolved spontaneously. She follows with cardiology   # DM: well controlled, Hg A1c in 6.6 range, following with endocrinology  # Skin cancer: no recurrence follows with dermatology  # Dyslipidemia: continues with statin will switch to crestor 5 mg Will continue with monthly labs      Return visit: No Follow-up on file.    Assessment and plan was discussed with the patient and he voiced his understanding and agreement.    Viral Samuel Palomino MD    Chief Complaint   Dixie Cunha is a 72 year old female here for routine follow up    History of Present Illness   The patient has ESKD from Type 2 Diabetes.  Her PMH is significant for DM (well controled), complicated by CAD s/p GABG 2003, with occluded bypass grafts, now with PHT, RHF, diabetic nephropathy s/p LDKT in 2003 from her sister, with baseline Cr was 1.4-1.5, until 8/12/16. She was found with a Cr of 3.5 during her routine f/u with transplant nephrology. Her immunosuppression had been Rapamune and Tacrolimus. 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 Her immunosuppression was switched to Tac/MPA with good tolerance.    She notes that when she had acute kidney injury creatinine up to 3.5 that she had retained a lot of fluid as well.  She has been on diuretics since this time point and has much improved volume from her acute kidney injury episode 2 years ago.  However she does note some worsening edema in the past several weeks.  She had a recent arterial thromboembolism and is now on Coumadin.  In June 2018 she did have an echocardiogram which showed stable heart function.  She also notes stable kidney function over the past several months.  She believes the worsening edema may be related to the tomato season and the salty use with her tomatoes.  She currently is taking bumetanide 1 mg  daily.  She is a little frustrated with her care due to the multiple providers and coordinators that have been involved in addition to the large number of providers she sees from the [facility] clinic.  June 2018 she developed arterial thromboembolism    Recent Hospitalizations:  [] No [x] Yes  June 2018 thromboembolism status post thrombectomy now on chronic Coumadin therapy   New Medical Issues: [] No [x] Yes  chronic anticoagulation   Decreased energy: [x] No [] Yes    Chest pain or SOB with exertion:  [x] No [] Yes    Appetite change or weight change: [x] No [] Yes    Nausea, vomiting or diarrhea:  [x] No [] Yes    Fever, sweats or chills: [x] No [] Yes    Leg swelling: [] No [x] Yes  worsening lower extremity edema over the past several weeks related to salt intake     Other medical issues:  No    Home BP: 110-130's    Review of Systems   A comprehensive review of systems was obtained and negative, except as noted in the HPI or PMH.    Problem List   Patient Active Problem List   Diagnosis     Diabetes (H)     Kidney replaced by transplant     CAD (coronary artery disease)     S/P CABG (coronary artery bypass graft)     Skin cancer     Secondary oxalosis (H)     Hypertension     Aftercare following organ transplant     Immunosuppression (H)       Social History  Social History     Social History     Marital status:      Spouse name: N/A     Number of children: N/A     Years of education: N/A     Occupational History     Not on file.     Social History Main Topics     Smoking status: Former Smoker     Smokeless tobacco: Never Used     Alcohol use Yes     Drug use: No     Sexual activity: Not on file     Other Topics Concern     Not on file     Social History Narrative       Allergies      Allergies   Allergen Reactions     Gabapentin Swelling       Medications   Outpatient Encounter Prescriptions as of 8/23/2018   Medication Sig Dispense Refill     acetaminophen (TYLENOL) 325 MG tablet Take 325 mg by mouth as  needed        ASPIRIN PO Take 81 mg by mouth daily       blood glucose (ONE TOUCH ULTRA) test strip Use to test blood glucose 10 times daily       blood glucose monitoring (ONE TOUCH ULTRASOFT) lancets Dispense item covered by pt ins. E11.9 IDDM type II - Test 4 times/day. Reason: Insulin Pump       bumetanide (BUMEX) 1 MG tablet Take 1 tablet (1 mg) by mouth daily 90 tablet 3     Calcium Acetate, Phos Binder, 667 MG CAPS Take 667 mg by mouth 3 times daily (with meals) 270 capsule 3     CLARITHROMYCIN PO Take 500 mg by mouth 2 times daily       glucagon (GLUCAGON EMERGENCY) 1 MG injection Inject 1 mg subcutaneous. As  idrected  Indications: HYPOGLYCEMIC DISORDER       insulin lispro (HUMALOG) 100 UNIT/ML VIAL Inject 100 Units Subcutaneous 3 times daily (before meals)       magnesium oxide (MAG-OX) 400 (241.3 Mg) MG tablet Take 1 tablet (400 mg) by mouth daily 90 tablet 3     metoclopramide (REGLAN) 10 MG tablet Take 10 mg by mouth as needed        metoprolol (LOPRESSOR) 25 MG tablet Take 1 tablet (25 mg) by mouth 2 times daily 180 tablet 3     mycophenolic acid (GENERIC EQUIVALENT) 360 MG EC tablet Take 1 tablet (360 mg) by mouth 2 times daily 180 tablet 3     nitroglycerin (NITROSTAT) 0.4 MG SL tablet Place 0.4 mg under the tongue       Omega-3 Fatty Acids (FISH OIL) 1000 MG CPDR Take  by mouth. One capsule twice daily.  Indications: Arteriosclerotic Heart Disease       penicillin V (VEETID) 250 mg/5 mL suspension Take 160-800 mg by mouth 3 TAB BEFORE AND 3 TAB AFTER DENTAL PROCEDURE       rosuvastatin (CRESTOR) 5 MG tablet Take 1 tablet (5 mg) by mouth daily 90 tablet 3     Sodium Polystyrene Sulfonate (KAYEXALATE) POWD Take 30 mls by mouth, Mix with water to equal 30 ml. Only take if Potassium level >5.5 (Patient not taking: Reported on 11/27/2017) 453 g 0     sulfamethoxazole-trimethoprim (BACTRIM) 400-80 MG per tablet Take 1 tablet by mouth Every Mon, Wed, Fri Morning (Patient not taking: Reported on 5/21/2018)  45 tablet 3     tacrolimus (GENERIC EQUIVALENT) 0.5 MG capsule Take 1 capsule (0.5 mg) by mouth 2 times daily 60 capsule 11     [DISCONTINUED] Calcium Acetate, Phos Binder, 667 MG CAPS Take 667 mg by mouth 3 times daily (with meals) 270 capsule 3     [DISCONTINUED] magnesium oxide (MAG-OX) 400 (241.3 MG) MG tablet Take 1 tablet (400 mg) by mouth daily 90 tablet 3     No facility-administered encounter medications on file as of 8/23/2018.        Physical Exam   Vital Signs: /79 (BP Location: Right arm)  Pulse 73  Temp 98.1  F (36.7  C) (Oral)  Wt 69.8 kg (153 lb 12.8 oz)  SpO2 99%  BMI 26.4 kg/m2  GENERAL APPEARANCE: alert and no distress  HENT: mouth without ulcers or lesions  LYMPHATICS: no cervical or supraclavicular nodes  RESP: lungs clear to auscultation - no rales, rhonchi or wheezes  CV: regular rhythm, normal rate, no rub, no murmur  EDEMA: 2+ pitting edema bilaterally in her lower extremities  ABDOMEN: soft, nondistended, nontender, bowel sounds normal  MS: extremities normal - no gross deformities noted, no evidence of inflammation in joints, no muscle tenderness  SKIN: no rash  TX KIDNEY: normal    Data   Renal -   DSA Present   Date Value Ref Range Status   09/16/2016 NO  Final     SA1 Comments   Date Value Ref Range Status   09/16/2016   Final    Test performed by modified procedure. Serum heat inactivated. High-risk,   mfi >3,000. Mod-risk, mfi 500-3,000. Predictive PRA derived from local   donor pool.        SA2 Comments   Date Value Ref Range Status   09/16/2016   Final    Test performed by modified procedure. Serum heat inactivated. High-risk,   mfi >3,000. Mod-risk, mfi 500-3,000. Predictive PRA derived from local   donor pool.        BK Virus Log   Date Value Ref Range Status   09/16/2016  <2.7 Log copies/mL Final    Not Calculated   The Real-Time quantitative BK Virus assay was developed and its performance   characteristics determined by the [facility]  at   the [facility] in [place], [place]. The   primers and probes for each analyte are Analyte Specific Reagents (ASRs)   manufactured by [employer].   ASRs are used in many laboratory tests necessary for standard medical care and   generally do not require U.S. Food and Drug Administration approval. The FDA   has determined that such clearance or approval is not necessary.   This test is used for clinical purposes. It should not be regarded as   investigational or for research. This laboratory is certified under the   Clinical Laboratory Improvement Amendments of 1988 (CLIA-88) as qualified to   perform high complexity clinical laboratory testing.       EBV DNA Log of Copies   Date Value Ref Range Status   04/10/2018 <2.7 <2.7 [Log_copies]/mL Final     Comment:     The Real-Time quantitative EBV assay was developed and its performance   characteristics determined by the [facility] Laboratory at   the [facility] in [place], [place].  The   primers and probes are Analyte Specific Reagents (ASRs) manufactured  by   [employer].  ASRs are used in many laboratory tests necessary for standard medical care and   generally do not require U.S. Food and Drug Administration approval.  The FDA   has determined that such clearance or approval is not necessary.  This test   is used for clinical purposes.  It should not be regarded as investigational   or research.  This laboratory is certified under the Clinical Laboratory Improvement   Amendments of 1988 (CLIA-88) as qualified to perform high complexity clinical   laboratory testing.  The quantitative range of this assay is 500-22,500,00 copies/mL (2.7-7.4 log   copies/mL).  A negative result does not rule out the presence of PCR   inhibitors in the patient specimen or EBV DNA nucleic acid in concentrations   below the level of detection of the assay.  Inhibition may also lead to   underestimation of  viral quantitation.       Log IU/mL of CMVQNT   Date Value Ref Range Status   09/18/2017 Not Calculated <2.1 [Log_IU]/mL Final     Creatinine   Date Value Ref Range Status   08/15/2018 2.15 (H) 0.52 - 1.04 mg/dL Final     Creatinine (External)   Date Value Ref Range Status   02/26/2018 2.62 (H) 0.57 - 1.00 mg/dL Final     Protein Total Urine g/gr Creatinine   Date Value Ref Range Status   05/10/2018 0.16 0 - 0.2 g/g Cr Final     Protein Random Urine (External)   Date Value Ref Range Status   02/26/2018 39.4 Not Estab_ ug/mL Final     Tacrolimus Level   Date Value Ref Range Status   08/15/2018 3.6 (L) 5.0 - 15.0 ug/L Final     Comment:     Tacrolimus Reference Range  Kidney Transplant  Pediatric                      ug/L    0-3 months post transplant   10-12    3-6 months post transplant   8-10    6-12 months post transplant  6-8    >12 months post transplant   4-7  Adult    0-6 months post transplant   8-10    6-12 months post transplant  6-8    >12 months post transplant   4-6    >5 years post transplant     3-5  Heart Transplant  Pediatric    0-12 months post transplant  10-15    >12 months post transplant   5-10  Adult    0-3 months post transplant   10-15    3-6 months post transplant   8-12    6-12 months post transplant  6-12    >12 months post transplant   6-10  Lung Transplant    0-12 months post transplant  10-15    >12 months post transplant   8-12  Liver Transplant  Pediatric    0-3 months post transplant   10-15    3-6 months post transplant   8-10    >6 months post transplant    6-8  Adult    0-3 months post transplant   10-12    3-6 months post transplant   8-10    >6 months post transplant    6-8  Pancreas Transplant    0-6 months post transplant   8-10    >6 months post transplant    5-8  This test was developed and its performance characteristics determined by the   [facility],  Special Chemistry Laboratory. It has   not been cleared or approved by the FDA. The laboratory  is regulated under   CLIA as qualified to perform high-complexity testing. This test is used for   clinical purposes. It should not be regarded as investigational or for   research.       Tacrolimus(FK-506) (External)   Date Value Ref Range Status   02/26/2018 3.5 2.0 - 20.0 ng/mL Final     Sirolimus Level   Date Value Ref Range Status   09/29/2016 3.8 (L) 5.0 - 15.0 ug/L Final     Comment:     Sirolimus Reference Range   Kidney Transplant   Adult                          ug/L     6-12 months post transplant  8-10     >12 months post transplant   5-8     >5 years post transplant     4-6   Heart Transplant               4-10   Lung Transplant                5-15   Hematopoietic Stem Cell Tx     3-12   BMT Pediatric                  3-12   This test was developed and its performance characteristics determined by the   [facility],  Special Chemistry Laboratory. It has   not been cleared or approved by the FDA. The laboratory is regulated under   CLIA   as qualified to perform high-complexity testing. This test is used for   clinical   purposes. It should not be regarded as investigational or for research.       Mycophenolic Acid Mg/L   Date Value Ref Range Status   06/11/2018 0.71 (L) 1.00 - 3.50 mg/L Final     Glucose   Date Value Ref Range Status   08/15/2018 54 (L) 70 - 99 mg/dL Final     Glucose (External)   Date Value Ref Range Status   02/26/2018 138 (H) 65 - 99 mg/dL Final     Hemoglobin A1C (External)   Date Value Ref Range Status   01/11/2017 6.6 (H) <=6.4 % Final     Hemoglobin   Date Value Ref Range Status   08/15/2018 10.0 (L) 11.7 - 15.7 g/dL Final     Hemoglobin (External)   Date Value Ref Range Status   02/26/2018 11.2 11.1 - 15.9 g/dL Final     Ferritin   Date Value Ref Range Status   01/20/2017 36 8 - 252 ng/mL Final     Iron Saturation Index   Date Value Ref Range Status   01/20/2017 12 (L) 15 - 46 % Final     Parathyroid Hormone Intact   Date Value Ref Range Status    09/29/2016 214 (H) 12 - 72 pg/mL Final     Parathormone Intact (External)   Date Value Ref Range Status   09/13/2017 83.3  Final     Vitamin D Deficiency screening   Date Value Ref Range Status   10/03/2016 35 20 - 75 ug/L Final     Comment:     Season, race, dietary intake, and treatment affect the concentration of   25-hydroxy-Vitamin D. Values may decrease during winter months and increase   during summer months. Values 20-29 ug/L may indicate Vitamin D insufficiency   and values <20 ug/L may indicate Vitamin D deficiency.   Vitamin D determination is routinely performed by an immunoassay specific for   25 hydroxyvitamin D3.  If an individual is on vitamin D2 (ergocalciferol)   supplementation, please specify 25 OH vitamin D2 and D3 level determination   by   LCMSMS test [id number].       Calcium   Date Value Ref Range Status   08/15/2018 8.8 8.5 - 10.1 mg/dL Final     Calcium (External)   Date Value Ref Range Status   02/26/2018 8.6 (L) 8.7 - 10.3 mg/dL Final     Phosphorus   Date Value Ref Range Status   04/10/2018 4.2 2.5 - 4.5 mg/dL Final     Phosphorus (External)   Date Value Ref Range Status   02/26/2018 4 . 9 (H) 2.5 - 4.5 mg/dL Final     Magnesium   Date Value Ref Range Status   05/10/2018 2.0 1.6 - 2.3 mg/dL Final     Potassium   Date Value Ref Range Status   08/15/2018 4.5 3.4 - 5.3 mmol/L Final     Potassium (External)   Date Value Ref Range Status   02/26/2018 4.3 3.5 - 5.2 mmol/L Final     No results found for: CMVIGG, CMIGG, CMIG, EBVCAG, EBIGG, EBIG2      Kidney/Pancreas Recipient

## 2018-08-23 NOTE — MR AVS SNAPSHOT
After Visit Summary   8/23/2018    Dixie Cunha    MRN: [id number]           Patient Information     Date Of Birth          1945        Visit Information        Provider Department      8/23/2018 1:30 PM Recipient, [facility]        Today's Diagnoses     Anemia, unspecified type    -  1    Edema, unspecified type           Follow-ups after your visit        Follow-up notes from your care team     Return in about 3 months (around 11/23/2018) for Routine Visit.      Your next 10 appointments already scheduled     Nov 19, 2018  2:45 PM CST   (Arrive by 2:15 PM)   Return Kidney Transplant with [facility] Recipient   [facility] ([facility])    [address]  [address]  [facility] [address] [phone]              Future tests that were ordered for you today     Open Future Orders        Priority Expected Expires Ordered    Ferritin Routine  9/22/2018 8/23/2018    Iron and iron binding capacity Routine  9/22/2018 8/23/2018            Who to contact     If you have questions or need follow up information about today's clinic visit or your schedule please contact [facility] directly at [phone].  Normal or non-critical lab and imaging results will be communicated to you by MyChart, letter or phone within 4 business days after the clinic has received the results. If you do not hear from us within 7 days, please contact the clinic through [employer] or phone. If you have a critical or abnormal lab result, we will notify you by phone as soon as possible.  Submit refill requests through [employer] or call your pharmacy and they will forward the refill request to us. Please allow 3 business days for your refill to be completed.          Additional Information About Your Visit        MyChart Information     [employer] gives you secure access to your electronic health record. If you see a primary care provider, you  can also send messages to your care team and make appointments. If you have questions, please call your primary care clinic.  If you do not have a primary care provider, please call [phone] and they will assist you.        Care EveryWhere ID     This is your Care EveryWhere ID. This could be used by other organizations to access your [place] medical records  [id number]        Your Vitals Were     Pulse Temperature Pulse Oximetry BMI (Body Mass Index)          73 98.1  F (36.7  C) (Oral) 99% 26.4 kg/m2         Blood Pressure from Last 3 Encounters:   08/23/18 138/79   05/26/18 163/73   05/21/18 136/72    Weight from Last 3 Encounters:   08/23/18 69.8 kg (153 lb 12.8 oz)   05/26/18 68.8 kg (151 lb 9.6 oz)   05/21/18 69.7 kg (153 lb 9.6 oz)                 Today's Medication Changes          These changes are accurate as of 8/23/18  2:33 PM.  If you have any questions, ask your nurse or doctor.               These medicines have changed or have updated prescriptions.        Dose/Directions    bumetanide 1 MG tablet   Commonly known as:  BUMEX   This may have changed:    - how much to take  - how to take this  - when to take this  - additional instructions   Used for:  Edema, unspecified type   Changed by:  Recipient, [facility]        Take 1-2 tablets daily as needed for leg swelling   Quantity:  180 tablet   Refills:  3         Stop taking these medicines if you haven't already. Please contact your care team if you have questions.     magnesium oxide 400 (241.3 Mg) MG tablet   Commonly known as:  MAG-OX   Stopped by:  Recipient, [facility]           sulfamethoxazole-trimethoprim 400-80 MG per tablet   Commonly known as:  BACTRIM   Stopped by:  Recipient, [facility]                Where to get your medicines      These medications were sent to [id number] [place] - [address]  [address] [place] [address]     Phone:  [phone]     bumetanide 1 MG  tablet                Primary Care Provider Office Phone # Fax #    Dilip Vidal [phone]       [facility] [address] [facility] [address]  [facility] [address]        Equal Access to Services     SRINIVAS MCGARRY : Neno mckeon cartero Sodeidraali, waaxda luqadaha, qaybta kaalmada aderogeryada, shell youngnora hager. So [facility] [phone].    ATENCIÓN: Si habla español, tiene a zapata disposición servicios gratuitos de asistencia lingüística. Llame al [phone].    We comply with applicable federal civil rights laws and [place] laws. We do not discriminate on the basis of race, color, national origin, age, disability, sex, sexual orientation, or gender identity.            Thank you!     Thank you for choosing [facility]  for your care. Our goal is always to provide you with excellent care. Hearing back from our patients is one way we can continue to improve our services. Please take a few minutes to complete the written survey that you may receive in the mail after your visit with us. Thank you!             Your Updated Medication List - Protect others around you: Learn how to safely use, store and throw away your medicines at www.disposemymeds.org.          This list is accurate as of 8/23/18  2:33 PM.  Always use your most recent med list.                   Brand Name Dispense Instructions for use Diagnosis    acetaminophen 325 MG tablet    TYLENOL     Take 325 mg by mouth as needed        ASPIRIN PO      Take 81 mg by mouth daily        blood glucose monitoring lancets      Dispense item covered by pt ins. E11.9 IDDM type II - Test 4 times/day. Reason: Insulin Pump        bumetanide 1 MG tablet    BUMEX    180 tablet    Take 1-2 tablets daily as needed for leg swelling    Edema, unspecified type       Calcium Acetate (Phos Binder) 667 MG Caps     270 capsule    Take 667 mg by mouth 3 times daily (with meals)    Kidney transplanted, Complications, kidney transplant        CLARITHROMYCIN PO      Take 500 mg by mouth 2 times daily        Fish Oil 1000 MG Cpdr      Take  by mouth. One capsule twice daily.  Indications: Arteriosclerotic Heart Disease        GLUCAGON EMERGENCY 1 MG kit   Generic drug:  glucagon      Inject 1 mg subcutaneous. As  idrected  Indications: HYPOGLYCEMIC DISORDER        insulin lispro 100 UNIT/ML injection    HumaLOG     Inject 100 Units Subcutaneous 3 times daily (before meals)        KAYEXALATE Powd     453 g    Take 30 mls by mouth, Mix with water to equal 30 ml. Only take if Potassium level >5.5    Hyperkalemia       metoprolol tartrate 25 MG tablet    LOPRESSOR    180 tablet    Take 1 tablet (25 mg) by mouth 2 times daily    Renal hypertension, stage 1-4 or unspecified chronic kidney disease       mycophenolic acid 360 MG EC tablet    GENERIC EQUIVALENT    180 tablet    Take 1 tablet (360 mg) by mouth 2 times daily    Kidney replaced by transplant       nitroGLYcerin 0.4 MG sublingual tablet    NITROSTAT     Place 0.4 mg under the tongue        ONETOUCH ULTRA test strip   Generic drug:  blood glucose monitoring      Use to test blood glucose 10 times daily        penicillin V 250 mg/5 mL suspension    VEETID     Take 160-800 mg by mouth 3 TAB BEFORE AND 3 TAB AFTER DENTAL PROCEDURE        REGLAN 10 MG tablet   Generic drug:  metoclopramide      Take 10 mg by mouth as needed        rosuvastatin 5 MG tablet    CRESTOR    90 tablet    Take 1 tablet (5 mg) by mouth daily    Hyperlipidemia, unspecified hyperlipidemia type       tacrolimus 0.5 MG capsule    GENERIC EQUIVALENT    60 capsule    Take 1 capsule (0.5 mg) by mouth 2 times daily    Immunosuppression (H)       warfarin 2 MG tablet    COUMADIN

## 2018-08-23 NOTE — NURSING NOTE
Chief Complaint   Patient presents with     RECHECK     3 month follow up 2003     /79 (BP Location: Right arm)  Pulse 73  Temp 98.1  F (36.7  C) (Oral)  Wt 69.8 kg (153 lb 12.8 oz)  SpO2 99%  BMI 26.4 kg/m2   Suyapa [place]

## 2018-08-23 NOTE — PROGRESS NOTES
TRANSPLANT NEPHROLOGY VISIT    Assessment & Plan   # LDKT: baseline Cr ~ 2.0-2.5; Stable renal function.  Her biggest concern was if her kidney function will improve back to her baseline of 1.5.  This appears unlikely however she does have some hemodynamic fluctuations in her creatinine likely related to volume overload and venous pressure on the kidney in the setting of diuretic use.   - Proteinuria: Minimal     # Immunosuppression: Tacrolimus immediate release (goal  4-6) and Mycophenolate mofetil (goal  1-3.5)   - Changes: No     # Prophylaxis:   - PJP: none, She does of note have a fairly low CD4 count checked in June 2018 at 100.   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 Rejection History:No   CMV IgG Ab Discordance (D+/R-): unknown    EBV IgG Ab Discordance (D+/R-): unknown    History of BK Viremia: Yes    Significant Complications: BK viremia    Transplant Office Phone Number: [phone]    # Hypertension: controlled; Goal BP: 130/80 on bumex, metoprolol   - Changes: Yes - increase the amount of as needed bumetanide and consider addition of metolazone if needed    # Anemia in chronic renal disease: Hgb: Stable   - Iron studies: low, start iron supplementation    # Mineral Bone Disorder:   - Secondary renal hyperparathyroidism: elevated 9/2016 to 214   - Vitamin D: low-normal in 2016   - Calcium: low-normal   - Phosphorus: well controlled on calcium acetate    # Electrolytes:    - Magnesium: within normal on supplementation   - Potassium: within normal limits    # Other Medical Issues:   # CAD: LHC was done and showed obstructive CAD only amenable to medical intervention, largely asymptomatic but recent noted 5 min chest  tightness that resolved spontaneously. She follows with cardiology   # DM: well controlled, Hg A1c in 6.6 range, following with endocrinology  # Skin cancer: no recurrence follows with dermatology  # Dyslipidemia: continues with statin will switch to crestor 5 mg Will continue with monthly labs      Return visit: No Follow-up on file.    Assessment and plan was discussed with the patient and he voiced his understanding and agreement.    Devon Palomino MD    Chief Complaint   Dixie Cunha is a 72 year old female here for routine follow up    History of Present Illness   The patient has ESKD from Type 2 Diabetes.  Her PMH is significant for DM (well controled), complicated by CAD s/p GABG 2003, with occluded bypass grafts, now with PHT, RHF, diabetic nephropathy s/p LDKT in 2003 from her sister, with baseline Cr was 1.4-1.5, until 8/12/16. She was found with a Cr of 3.5 during her routine f/u with transplant nephrology. Her immunosuppression had been Rapamune and Tacrolimus. 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 Her immunosuppression was switched to Tac/MPA with good tolerance.    She notes that when she had acute kidney injury creatinine up to 3.5 that she had retained a lot of fluid as well.  She has been on diuretics since this time point and has much improved volume from her acute kidney injury episode 2 years ago.  However she does note some worsening edema in the past several weeks.  She had a recent arterial thromboembolism and is now on Coumadin.  In June 2018 she did have an echocardiogram which showed stable heart function.  She also notes stable kidney function over the past several months.  She believes the worsening edema may be related to the tomato season and the salty use with her tomatoes.  She currently is taking bumetanide 1 mg daily.  She is a little frustrated with her care due to the multiple providers and  coordinators that have been involved in addition to the large number of providers she sees from the [facility].  June 2018 she developed arterial thromboembolism    Recent Hospitalizations:  [] No [x] Yes  June 2018 thromboembolism status post thrombectomy now on chronic Coumadin therapy   New Medical Issues: [] No [x] Yes  chronic anticoagulation   Decreased energy: [x] No [] Yes    Chest pain or SOB with exertion:  [x] No [] Yes    Appetite change or weight change: [x] No [] Yes    Nausea, vomiting or diarrhea:  [x] No [] Yes    Fever, sweats or chills: [x] No [] Yes    Leg swelling: [] No [x] Yes  worsening lower extremity edema over the past several weeks related to salt intake     Other medical issues:  No    Home BP: 110-130's    Review of Systems   A comprehensive review of systems was obtained and negative, except as noted in the HPI or PMH.    Problem List   Patient Active Problem List   Diagnosis     Diabetes (H)     Kidney replaced by transplant     CAD (coronary artery disease)     S/P CABG (coronary artery bypass graft)     Skin cancer     Secondary oxalosis (H)     Hypertension     Aftercare following organ transplant     Immunosuppression (H)       Social History  Social History     Social History     Marital status:      Spouse name: N/A     Number of children: N/A     Years of education: N/A     Occupational History     Not on file.     Social History Main Topics     Smoking status: Former Smoker     Smokeless tobacco: Never Used     Alcohol use Yes     Drug use: No     Sexual activity: Not on file     Other Topics Concern     Not on file     Social History Narrative       Allergies      Allergies   Allergen Reactions     Gabapentin Swelling       Medications   Outpatient Encounter Prescriptions as of 8/23/2018   Medication Sig Dispense Refill     acetaminophen (TYLENOL) 325 MG tablet Take 325 mg by mouth as needed        ASPIRIN PO Take 81 mg by mouth daily       blood glucose (ONE TOUCH  ULTRA) test strip Use to test blood glucose 10 times daily       blood glucose monitoring (ONE TOUCH ULTRASOFT) lancets Dispense item covered by pt ins. E11.9 IDDM type II - Test 4 times/day. Reason: Insulin Pump       bumetanide (BUMEX) 1 MG tablet Take 1 tablet (1 mg) by mouth daily 90 tablet 3     Calcium Acetate, Phos Binder, 667 MG CAPS Take 667 mg by mouth 3 times daily (with meals) 270 capsule 3     CLARITHROMYCIN PO Take 500 mg by mouth 2 times daily       glucagon (GLUCAGON EMERGENCY) 1 MG injection Inject 1 mg subcutaneous. As  idrected  Indications: HYPOGLYCEMIC DISORDER       insulin lispro (HUMALOG) 100 UNIT/ML VIAL Inject 100 Units Subcutaneous 3 times daily (before meals)       magnesium oxide (MAG-OX) 400 (241.3 Mg) MG tablet Take 1 tablet (400 mg) by mouth daily 90 tablet 3     metoclopramide (REGLAN) 10 MG tablet Take 10 mg by mouth as needed        metoprolol (LOPRESSOR) 25 MG tablet Take 1 tablet (25 mg) by mouth 2 times daily 180 tablet 3     mycophenolic acid (GENERIC EQUIVALENT) 360 MG EC tablet Take 1 tablet (360 mg) by mouth 2 times daily 180 tablet 3     nitroglycerin (NITROSTAT) 0.4 MG SL tablet Place 0.4 mg under the tongue       Omega-3 Fatty Acids (FISH OIL) 1000 MG CPDR Take  by mouth. One capsule twice daily.  Indications: Arteriosclerotic Heart Disease       penicillin V (VEETID) 250 mg/5 mL suspension Take 160-800 mg by mouth 3 TAB BEFORE AND 3 TAB AFTER DENTAL PROCEDURE       rosuvastatin (CRESTOR) 5 MG tablet Take 1 tablet (5 mg) by mouth daily 90 tablet 3     Sodium Polystyrene Sulfonate (KAYEXALATE) POWD Take 30 mls by mouth, Mix with water to equal 30 ml. Only take if Potassium level >5.5 (Patient not taking: Reported on 11/27/2017) 453 g 0     sulfamethoxazole-trimethoprim (BACTRIM) 400-80 MG per tablet Take 1 tablet by mouth Every Mon, Wed, Fri Morning (Patient not taking: Reported on 5/21/2018) 45 tablet 3     tacrolimus (GENERIC EQUIVALENT) 0.5 MG capsule Take 1 capsule  (0.5 mg) by mouth 2 times daily 60 capsule 11     [DISCONTINUED] Calcium Acetate, Phos Binder, 667 MG CAPS Take 667 mg by mouth 3 times daily (with meals) 270 capsule 3     [DISCONTINUED] magnesium oxide (MAG-OX) 400 (241.3 MG) MG tablet Take 1 tablet (400 mg) by mouth daily 90 tablet 3     No facility-administered encounter medications on file as of 8/23/2018.        Physical Exam   Vital Signs: /79 (BP Location: Right arm)  Pulse 73  Temp 98.1  F (36.7  C) (Oral)  Wt 69.8 kg (153 lb 12.8 oz)  SpO2 99%  BMI 26.4 kg/m2  GENERAL APPEARANCE: alert and no distress  HENT: mouth without ulcers or lesions  LYMPHATICS: no cervical or supraclavicular nodes  RESP: lungs clear to auscultation - no rales, rhonchi or wheezes  CV: regular rhythm, normal rate, no rub, no murmur  EDEMA: 2+ pitting edema bilaterally in her lower extremities  ABDOMEN: soft, nondistended, nontender, bowel sounds normal  MS: extremities normal - no gross deformities noted, no evidence of inflammation in joints, no muscle tenderness  SKIN: no rash  TX KIDNEY: normal    Data   Renal -   DSA Present   Date Value Ref Range Status   09/16/2016 NO  Final     SA1 Comments   Date Value Ref Range Status   09/16/2016   Final    Test performed by modified procedure. Serum heat inactivated. High-risk,   mfi >3,000. Mod-risk, mfi 500-3,000. Predictive PRA derived from local   donor pool.        SA2 Comments   Date Value Ref Range Status   09/16/2016   Final    Test performed by modified procedure. Serum heat inactivated. High-risk,   mfi >3,000. Mod-risk, mfi 500-3,000. Predictive PRA derived from local   donor pool.        BK Virus Log   Date Value Ref Range Status   09/16/2016  <2.7 Log copies/mL Final    Not Calculated   The Real-Time quantitative BK Virus assay was developed and its performance   characteristics determined by the [facility] Diagnostic Laboratory at   the [facility] in [place], [place].  The   primers and probes for each analyte are Analyte Specific Reagents (ASRs)   manufactured by [employer].   ASRs are used in many laboratory tests necessary for standard medical care and   generally do not require U.S. Food and Drug Administration approval. The FDA   has determined that such clearance or approval is not necessary.   This test is used for clinical purposes. It should not be regarded as   investigational or for research. This laboratory is certified under the   Clinical Laboratory Improvement Amendments of 1988 (CLIA-88) as qualified to   perform high complexity clinical laboratory testing.       EBV DNA Log of Copies   Date Value Ref Range Status   04/10/2018 <2.7 <2.7 [Log_copies]/mL Final     Comment:     The Real-Time quantitative EBV assay was developed and its performance   characteristics determined by the Infect[facility] Diseases Diagnostic Laboratory at   the [facility] in [place], [place].  The   primers and probes are Analyte Specific Reagents (ASRs) manufactured  by   [employer].  ASRs are used in many laboratory tests necessary for standard medical care and   generally do not require U.S. Food and Drug Administration approval.  The FDA   has determined that such clearance or approval is not necessary.  This test   is used for clinical purposes.  It should not be regarded as investigational   or research.  This laboratory is certified under the Clinical Laboratory Improvement   Amendments of 1988 (CLIA-88) as qualified to perform high complexity clinical   laboratory testing.  The quantitative range of this assay is 500-22,500,00 copies/mL (2.7-7.4 log   copies/mL).  A negative result does not rule out the presence of PCR   inhibitors in the patient specimen or EBV DNA nucleic acid in concentrations   below the level of detection of the assay.  Inhibition may also lead to   underestimation of viral quantitation.       Log IU/mL of CMVQNT   Date Value Ref Range  Status   09/18/2017 Not Calculated <2.1 [Log_IU]/mL Final     Creatinine   Date Value Ref Range Status   08/15/2018 2.15 (H) 0.52 - 1.04 mg/dL Final     Creatinine (External)   Date Value Ref Range Status   02/26/2018 2.62 (H) 0.57 - 1.00 mg/dL Final     Protein Total Urine g/gr Creatinine   Date Value Ref Range Status   05/10/2018 0.16 0 - 0.2 g/g Cr Final     Protein Random Urine (External)   Date Value Ref Range Status   02/26/2018 39.4 Not Estab_ ug/mL Final     Tacrolimus Level   Date Value Ref Range Status   08/15/2018 3.6 (L) 5.0 - 15.0 ug/L Final     Comment:     Tacrolimus Reference Range  Kidney Transplant  Pediatric                      ug/L    0-3 months post transplant   10-12    3-6 months post transplant   8-10    6-12 months post transplant  6-8    >12 months post transplant   4-7  Adult    0-6 months post transplant   8-10    6-12 months post transplant  6-8    >12 months post transplant   4-6    >5 years post transplant     3-5  Heart Transplant  Pediatric    0-12 months post transplant  10-15    >12 months post transplant   5-10  Adult    0-3 months post transplant   10-15    3-6 months post transplant   8-12    6-12 months post transplant  6-12    >12 months post transplant   6-10  Lung Transplant    0-12 months post transplant  10-15    >12 months post transplant   8-12  Liver Transplant  Pediatric    0-3 months post transplant   10-15    3-6 months post transplant   8-10    >6 months post transplant    6-8  Adult    0-3 months post transplant   10-12    3-6 months post transplant   8-10    >6 months post transplant    6-8  Pancreas Transplant    0-6 months post transplant   8-10    >6 months post transplant    5-8  This test was developed and its performance characteristics determined by the   [facility],  Special Chemistry Laboratory. It has   not been cleared or approved by the FDA. The laboratory is regulated under   CLIA as qualified to perform high-complexity  testing. This test is used for   clinical purposes. It should not be regarded as investigational or for   research.       Tacrolimus(FK-506) (External)   Date Value Ref Range Status   02/26/2018 3.5 2.0 - 20.0 ng/mL Final     Sirolimus Level   Date Value Ref Range Status   09/29/2016 3.8 (L) 5.0 - 15.0 ug/L Final     Comment:     Sirolimus Reference Range   Kidney Transplant   Adult                          ug/L     6-12 months post transplant  8-10     >12 months post transplant   5-8     >5 years post transplant     4-6   Heart Transplant               4-10   Lung Transplant                5-15   Hematopoietic Stem Cell Tx     3-12   BMT Pediatric                  3-12   This test was developed and its performance characteristics determined by the   [facility],  Special Chemistry Laboratory. It has   not been cleared or approved by the FDA. The laboratory is regulated under   CLIA   as qualified to perform high-complexity testing. This test is used for   clinical   purposes. It should not be regarded as investigational or for research.       Mycophenolic Acid Mg/L   Date Value Ref Range Status   06/11/2018 0.71 (L) 1.00 - 3.50 mg/L Final     Glucose   Date Value Ref Range Status   08/15/2018 54 (L) 70 - 99 mg/dL Final     Glucose (External)   Date Value Ref Range Status   02/26/2018 138 (H) 65 - 99 mg/dL Final     Hemoglobin A1C (External)   Date Value Ref Range Status   01/11/2017 6.6 (H) <=6.4 % Final     Hemoglobin   Date Value Ref Range Status   08/15/2018 10.0 (L) 11.7 - 15.7 g/dL Final     Hemoglobin (External)   Date Value Ref Range Status   02/26/2018 11.2 11.1 - 15.9 g/dL Final     Ferritin   Date Value Ref Range Status   01/20/2017 36 8 - 252 ng/mL Final     Iron Saturation Index   Date Value Ref Range Status   01/20/2017 12 (L) 15 - 46 % Final     Parathyroid Hormone Intact   Date Value Ref Range Status   09/29/2016 214 (H) 12 - 72 pg/mL Final     Parathormone Intact  (External)   Date Value Ref Range Status   09/13/2017 83.3  Final     Vitamin D Deficiency screening   Date Value Ref Range Status   10/03/2016 35 20 - 75 ug/L Final     Comment:     Season, race, dietary intake, and treatment affect the concentration of   25-hydroxy-Vitamin D. Values may decrease during winter months and increase   during summer months. Values 20-29 ug/L may indicate Vitamin D insufficiency   and values <20 ug/L may indicate Vitamin D deficiency.   Vitamin D determination is routinely performed by an immunoassay specific for   25 hydroxyvitamin D3.  If an individual is on vitamin D2 (ergocalciferol)   supplementation, please specify 25 OH vitamin D2 and D3 level determination   by   LCMSMS test [id number].       Calcium   Date Value Ref Range Status   08/15/2018 8.8 8.5 - 10.1 mg/dL Final     Calcium (External)   Date Value Ref Range Status   02/26/2018 8.6 (L) 8.7 - 10.3 mg/dL Final     Phosphorus   Date Value Ref Range Status   04/10/2018 4.2 2.5 - 4.5 mg/dL Final     Phosphorus (External)   Date Value Ref Range Status   02/26/2018 4 . 9 (H) 2.5 - 4.5 mg/dL Final     Magnesium   Date Value Ref Range Status   05/10/2018 2.0 1.6 - 2.3 mg/dL Final     Potassium   Date Value Ref Range Status   08/15/2018 4.5 3.4 - 5.3 mmol/L Final     Potassium (External)   Date Value Ref Range Status   02/26/2018 4.3 3.5 - 5.2 mmol/L Final     No results found for: CMVIGG, CMIGG, CMIG, EBVCAG, EBIGG, EBIG2

## 2018-09-14 DIAGNOSIS — D64.9 ANEMIA, UNSPECIFIED TYPE: ICD-10-CM

## 2018-09-14 LAB
FERRITIN SERPL-MCNC: 102 NG/ML (ref 8–252)
IRON SATN MFR SERPL: 10 % (ref 15–46)
IRON SERPL-MCNC: 29 UG/DL (ref 35–180)
TIBC SERPL-MCNC: 292 UG/DL (ref 240–430)

## 2018-09-14 PROCEDURE — 83540 ASSAY OF IRON: CPT | Performed by: INTERNAL MEDICINE

## 2018-09-14 PROCEDURE — 82728 ASSAY OF FERRITIN: CPT | Performed by: INTERNAL MEDICINE

## 2018-09-14 PROCEDURE — 83550 IRON BINDING TEST: CPT | Performed by: INTERNAL MEDICINE

## 2018-09-14 PROCEDURE — 36415 COLL VENOUS BLD VENIPUNCTURE: CPT | Performed by: INTERNAL MEDICINE

## 2018-10-17 ENCOUNTER — DOCUMENTATION ONLY (OUTPATIENT)
Dept: TRANSPLANT | Facility: CLINIC | Age: 73
End: 2018-10-17

## 2018-10-18 NOTE — PROGRESS NOTES
Chart Prep    Clinic Visit on: 11/20/18    Last lab completed: 8/15/18    Lab letter updated: 9/14/18    Lab orders up to date in Epic.

## 2018-10-24 ENCOUNTER — TELEPHONE (OUTPATIENT)
Dept: NEPHROLOGY | Facility: CLINIC | Age: 73
End: 2018-10-24

## 2018-10-24 ENCOUNTER — TRANSFERRED RECORDS (OUTPATIENT)
Dept: HEALTH INFORMATION MANAGEMENT | Facility: CLINIC | Age: 73
End: 2018-10-24

## 2018-10-24 NOTE — TELEPHONE ENCOUNTER
Received voicemail from patient requesting call back for med issue. States she received a refill denial from [facility] for crestor, denial stated provider didn't want her to take it any longer. She was concerned and confused. No documentation in Epic of this. Explained cholesterol medications are not managed long term by the transplant team, and should be approved by her PCP. Per 8/23/18 office visit, patient encouraged to continue medication. She received refills from her cardiologist today, just wanted to make sure it's okay for her to take. Denied further questions.    Debbie Olsen RN

## 2018-11-13 DIAGNOSIS — Z79.899 ENCOUNTER FOR LONG-TERM CURRENT USE OF MEDICATION: ICD-10-CM

## 2018-11-13 DIAGNOSIS — Z48.298 AFTERCARE FOLLOWING ORGAN TRANSPLANT: ICD-10-CM

## 2018-11-13 DIAGNOSIS — Z94.0 KIDNEY REPLACED BY TRANSPLANT: ICD-10-CM

## 2018-11-13 LAB
CREAT UR-MCNC: 65 MG/DL
ERYTHROCYTE [DISTWIDTH] IN BLOOD BY AUTOMATED COUNT: 16.2 % (ref 10–15)
HCT VFR BLD AUTO: 35.3 % (ref 35–47)
HGB BLD-MCNC: 10.3 G/DL (ref 11.7–15.7)
MCH RBC QN AUTO: 26.7 PG (ref 26.5–33)
MCHC RBC AUTO-ENTMCNC: 29.2 G/DL (ref 31.5–36.5)
MCV RBC AUTO: 92 FL (ref 78–100)
PLATELET # BLD AUTO: 196 10E9/L (ref 150–450)
PROT UR-MCNC: 0.22 G/L
PROT/CREAT 24H UR: 0.33 G/G CR (ref 0–0.2)
RBC # BLD AUTO: 3.86 10E12/L (ref 3.8–5.2)
WBC # BLD AUTO: 5.2 10E9/L (ref 4–11)

## 2018-11-13 PROCEDURE — 80197 ASSAY OF TACROLIMUS: CPT | Performed by: INTERNAL MEDICINE

## 2018-11-13 PROCEDURE — 84156 ASSAY OF PROTEIN URINE: CPT | Performed by: INTERNAL MEDICINE

## 2018-11-13 PROCEDURE — 36415 COLL VENOUS BLD VENIPUNCTURE: CPT | Performed by: INTERNAL MEDICINE

## 2018-11-13 PROCEDURE — 85027 COMPLETE CBC AUTOMATED: CPT | Performed by: INTERNAL MEDICINE

## 2018-11-13 PROCEDURE — 80048 BASIC METABOLIC PNL TOTAL CA: CPT | Performed by: INTERNAL MEDICINE

## 2018-11-14 LAB
ANION GAP SERPL CALCULATED.3IONS-SCNC: 11 MMOL/L (ref 3–14)
BUN SERPL-MCNC: 45 MG/DL (ref 7–30)
CALCIUM SERPL-MCNC: 9.3 MG/DL (ref 8.5–10.1)
CHLORIDE SERPL-SCNC: 110 MMOL/L (ref 94–109)
CO2 SERPL-SCNC: 22 MMOL/L (ref 20–32)
CREAT SERPL-MCNC: 1.98 MG/DL (ref 0.52–1.04)
GFR SERPL CREATININE-BSD FRML MDRD: 25 ML/MIN/1.7M2
GLUCOSE SERPL-MCNC: 57 MG/DL (ref 70–99)
POTASSIUM SERPL-SCNC: 4.4 MMOL/L (ref 3.4–5.3)
SODIUM SERPL-SCNC: 143 MMOL/L (ref 133–144)
TACROLIMUS BLD-MCNC: 4.2 UG/L (ref 5–15)
TME LAST DOSE: ABNORMAL H

## 2018-11-20 ENCOUNTER — OFFICE VISIT (OUTPATIENT)
Dept: NEPHROLOGY | Facility: CLINIC | Age: 73
End: 2018-11-20
Attending: INTERNAL MEDICINE
Payer: COMMERCIAL

## 2018-11-20 VITALS
HEART RATE: 76 BPM | WEIGHT: 157.2 LBS | OXYGEN SATURATION: 98 % | DIASTOLIC BLOOD PRESSURE: 67 MMHG | BODY MASS INDEX: 26.98 KG/M2 | SYSTOLIC BLOOD PRESSURE: 169 MMHG

## 2018-11-20 DIAGNOSIS — D63.1 ANEMIA DUE TO CHRONIC KIDNEY DISEASE, UNSPECIFIED CKD STAGE: ICD-10-CM

## 2018-11-20 DIAGNOSIS — E61.1 LOW IRON: ICD-10-CM

## 2018-11-20 DIAGNOSIS — N18.9 ANEMIA DUE TO CHRONIC KIDNEY DISEASE, UNSPECIFIED CKD STAGE: ICD-10-CM

## 2018-11-20 DIAGNOSIS — E78.5 HYPERLIPIDEMIA, UNSPECIFIED HYPERLIPIDEMIA TYPE: ICD-10-CM

## 2018-11-20 DIAGNOSIS — R60.9 EDEMA, UNSPECIFIED TYPE: ICD-10-CM

## 2018-11-20 PROCEDURE — G0463 HOSPITAL OUTPT CLINIC VISIT: HCPCS | Mod: ZF

## 2018-11-20 RX ORDER — BUMETANIDE 0.5 MG/1
TABLET ORAL
Qty: 360 TABLET | Refills: 3 | Status: SHIPPED | OUTPATIENT
Start: 2018-11-20 | End: 2019-10-16

## 2018-11-20 ASSESSMENT — PAIN SCALES - GENERAL: PAINLEVEL: NO PAIN (0)

## 2018-11-20 NOTE — MR AVS SNAPSHOT
After Visit Summary   11/20/2018    Dixie Cunha    MRN: [id number]           Patient Information     Date Of Birth          1945        Visit Information        Provider Department      11/20/2018 2:05 PM 1,  Kidney/Pancreas Recipient [facility]ph[facility]        Today's Diagnoses     Hyperlipidemia, unspecified hyperlipidemia type        Edema, unspecified type           Follow-ups after your visit        Your next 10 appointments already scheduled     May 21, 2019  2:05 PM CDT   (Arrive by 1:35 PM)   Return Kidney Transplant with [facility] Kidney/Pancreas Recipient 1   [facility] ([facility] [place])    [address]  [address]  [facility] [address] [phone]              Who to contact     If you have questions or need follow up information about today's clinic visit or your schedule please contact [facility] NEPHROLOGY directly at [phone].  Normal or non-critical lab and imaging results will be communicated to you by MyChart, letter or phone within 4 business days after the clinic has received the results. If you do not hear from us within 7 days, please contact the clinic through Tele[employer] or phone. If you have a critical or abnormal lab result, we will notify you by phone as soon as possible.  Submit refill requests through [employer] or call your pharmacy and they will forward the refill request to us. Please allow 3 business days for your refill to be completed.          Additional Information About Your Visit        MyChart Information     [employer] gives you secure access to your electronic health record. If you see a primary care provider, you can also send messages to your care team and make appointments. If you have questions, please call your primary care clinic.  If you do not have a primary care provider, please call [phone] and they will assist you.        Care EveryWhere ID     This is your Care EveryWhere ID. This could be used by  other organizations to access your [place] medical records  [id number]        Your Vitals Were     Pulse Pulse Oximetry BMI (Body Mass Index)             76 98% 26.98 kg/m2          Blood Pressure from Last 3 Encounters:   11/20/18 169/67   08/23/18 138/79   05/26/18 163/73    Weight from Last 3 Encounters:   11/20/18 71.3 kg (157 lb 3.2 oz)   08/23/18 69.8 kg (153 lb 12.8 oz)   05/26/18 68.8 kg (151 lb 9.6 oz)              Today, you had the following     No orders found for display         Today's Medication Changes          These changes are accurate as of 11/20/18  2:52 PM.  If you have any questions, ask your nurse or doctor.               These medicines have changed or have updated prescriptions.        Dose/Directions    bumetanide 0.5 MG tablet   Commonly known as:  BUMEX   This may have changed:    - medication strength  - additional instructions   Used for:  Edema, unspecified type   Changed by:  1,  Kidney/Pancreas Recipient        Take 1-2 mg daily as needed for leg swelling   Quantity:  360 tablet   Refills:  3         Stop taking these medicines if you haven't already. Please contact your care team if you have questions.     CLARITHROMYCIN PO   Stopped by:  1,  Kidney/Pancreas Recipient                Where to get your medicines      These medications were sent to [facility]  for [address] [address]     Phone:  [phone]     bumetanide 0.5 MG tablet                Primary Care Provider Office Phone # Fax #    Dilip Vidal [phone]       [place] [address] [address] [address]        Equal Access to Services     [facility] : Hadberyl Boyd, wasirida lumagalys, qaybta kaalshell dimas. So [facility] [phone].    ATENCIÓN: Si habla español, tiene a zapata disposición servicios gratuitos de asistencia lingüística.  Aurea al [phone].    We comply with applicable federal civil rights laws and [place] laws. We do not discriminate on the basis of race, color, national origin, age, disability, sex, sexual orientation, or gender identity.            Thank you!     Thank you for choosing [facility] NEPHROLOGY  for your care. Our goal is always to provide you with excellent care. Hearing back from our patients is one way we can continue to improve our services. Please take a few minutes to complete the written survey that you may receive in the mail after your visit with us. Thank you!             Your Updated Medication List - Protect others around you: Learn how to safely use, store and throw away your medicines at www.disposemymeds.org.          This list is accurate as of 11/20/18  2:52 PM.  Always use your most recent med list.                   Brand Name Dispense Instructions for use Diagnosis    acetaminophen 325 MG tablet    TYLENOL     Take 325 mg by mouth as needed        ASPIRIN PO      Take 81 mg by mouth daily        blood glucose monitoring lancets      Dispense item covered by pt ins. E11.9 IDDM type II - Test 4 times/day. Reason: Insulin Pump        bumetanide 0.5 MG tablet    BUMEX    360 tablet    Take 1-2 mg daily as needed for leg swelling    Edema, unspecified type       Calcium Acetate (Phos Binder) 667 MG Caps     270 capsule    Take 667 mg by mouth 3 times daily (with meals)    Kidney transplanted, Complications, kidney transplant       Fish Oil 1000 MG Cpdr      Take  by mouth. One capsule twice daily.  Indications: Arteriosclerotic Heart Disease        GLUCAGON EMERGENCY 1 MG kit   Generic drug:  glucagon      Inject 1 mg subcutaneous. As  idrected  Indications: HYPOGLYCEMIC DISORDER        insulin lispro 100 UNIT/ML injection    HumaLOG     Inject 100 Units Subcutaneous 3 times daily (before meals)        KAYEXALATE Powd     453 g    Take 30 mls by mouth, Mix with water to equal 30 ml. Only take if  Potassium level >5.5    Hyperkalemia       metoprolol tartrate 25 MG tablet    LOPRESSOR    180 tablet    Take 1 tablet (25 mg) by mouth 2 times daily    Renal hypertension, stage 1-4 or unspecified chronic kidney disease       mycophenolic acid 360 MG EC tablet    GENERIC EQUIVALENT    180 tablet    Take 1 tablet (360 mg) by mouth 2 times daily    Kidney replaced by transplant       nitroGLYcerin 0.4 MG sublingual tablet    NITROSTAT     Place 0.4 mg under the tongue        ONETOUCH ULTRA test strip   Generic drug:  blood glucose monitoring      Use to test blood glucose 10 times daily        penicillin V 250 mg/5 mL suspension    VEETID     Take 160-800 mg by mouth 3 TAB BEFORE AND 3 TAB AFTER DENTAL PROCEDURE        REGLAN 10 MG tablet   Generic drug:  metoclopramide      Take 10 mg by mouth as needed        rosuvastatin 5 MG tablet    CRESTOR    90 tablet    Take 1 tablet (5 mg) by mouth daily    Hyperlipidemia, unspecified hyperlipidemia type       tacrolimus 0.5 MG capsule    GENERIC EQUIVALENT    60 capsule    Take 1 capsule (0.5 mg) by mouth 2 times daily    Immunosuppression (H)       warfarin 2 MG tablet    COUMADIN

## 2018-11-20 NOTE — NURSING NOTE
Chief Complaint   Patient presents with     RECHECK     kidney tx follow up     Blood pressure 169/67, pulse 76, weight 71.3 kg (157 lb 3.2 oz), SpO2 98 %.    MARJORIE FAGAN CMA

## 2018-11-20 NOTE — LETTER
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     # Hypertension: Controlled; Goal BP: < 130/80   - Changes: No    # Diabetes: Controlled    - Insulin regiment per primary care    # Anemia in chronic renal disease: Hgb: Stable, goal at approximately 10, but iron deficient. Saturation of 10%. No visible blood in stools. No black stools.     - Appointment for Iron infusion course has been placed. Will hold on her daily iron at this time.   - Will check stools samples after withholding oral iron.   - Will refer to GI based on stool studies.     # Mineral Bone Disorder:    - Secondary renal hyperparathyroidism; PTH level is: Acceptable for the degree of renal dysfunction   - Vitamin D; level is: Normal   - Calcium; level is: Normal   - Phosphorus; level is: normal on last check in 2018. Will need to be updated     # Electrolytes:   - Potassium; level: Normal  - Magnesium; level: Normal  - Bicarbonate; level: Normal    # Hyperuricemia    - Uric acid was 10 in 2018. Will start allopurinol     # Dyslipidemia    - On Crestor    # Pulmonary hypertension   - Controlled   - Stable diuretic regiment, will change bumex to daily dosing instead of every other day    # Irregular  heartbeat   - On lifelong anticoagulation for atrial fibrillation and history of DVT    # Skin Cancer Risk:    - Discussed sun protection and recommend regular follow up with Dermatology.    # Medical Compliance: Yes    Follow-up: Data Unavailable     # Transplant History:  Etiology of kidney failure: diabetes mellitus type 2  Tx: LDKT  Transplant: 8/26/2003 (Kidney)  Donor Type: Living Donor Class:   Significant changes in immunosuppression: None  Significant transplant-related complications: None    Transplant Office Phone Number: [phone]    Assessment and plan was discussed with the patient and they voiced understanding and agreement.    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 Patient was last evaluated by Dr. Palomino on 08/23/18, please refer to his note for further details. Today, patient reports she is confused with which medications she should be taking, such as her magnesium. She has discontinued her probiotics. Patient is not concerned today for any new skin cancers today, however reports that she does have new occurrence around her scalp every six months. Last colonoscopy was competed within the last six months and was normal. Does endorse slight heartburn today, but denies any abdominal pains, abdominal swelling, or black stools. Does takes iron supplementation twice daily. Has not completed a PAP smear in several years and has not completed a mammogram this year (does have a history of breast cancer from the early 1990s). She is very compliant about her yearly dermatologist appointments. Has received her flu vaccination this year. Has not completed the new shingrix vaccine as she has not been able to find a clinic which carries it.     Recent Hospitalizations:  [x] No [] Yes    New Medical Issues: [x] No [] Yes     Decreased energy: [x] No [] Yes    Chest pain or SOB with exertion:  [] No [x] Yes Heartburn    Appetite change or weight change: [x] No [] Yes    Nausea, vomiting or diarrhea:  [x] No [] Yes    Fever, sweats or chills: [x] No [] Yes    Leg swelling: [] No [x] Yes  Wears compression stockings and takes bumex      Other medical issues:  No     Review of Systems:   A comprehensive review of systems was obtained and negative, except as noted in the HPI or past medical history.     Active Medications:   Current Outpatient Prescriptions:      acetaminophen (TYLENOL) 325 MG tablet, Take 325 mg by mouth as needed , Disp: , Rfl:      ASPIRIN PO, Take 81 mg by mouth daily, Disp: , Rfl:      blood glucose (ONE TOUCH ULTRA) test strip, Use to test blood glucose 10 times daily, Disp: , Rfl:      blood glucose monitoring (ONE TOUCH ULTRASOFT) lancets, Dispense item covered by pt ins. E11.9 IDDM type II - Test 4 times/day. Reason: Insulin Pump, Disp: , Rfl:      bumetanide (BUMEX) 0.5 MG tablet, Take 1-2 mg daily as needed for leg swelling, Disp: 360 tablet, Rfl: 3     Calcium Acetate, Phos Binder, 667 MG CAPS, Take 667 mg by mouth 3 times daily (with meals), Disp: 270 capsule, Rfl: 3     insulin lispro (HUMALOG) 100 UNIT/ML VIAL, Inject 100 Units Subcutaneous 3 times daily (before meals), Disp: , Rfl:      metoclopramide (REGLAN) 10 MG tablet, Take 10 mg by mouth as needed , Disp: , Rfl:      metoprolol (LOPRESSOR) 25 MG tablet, Take 1 tablet (25 mg) by mouth 2 times daily, Disp: 180 tablet, Rfl: 3     mycophenolic acid (GENERIC EQUIVALENT) 360 MG EC tablet, Take 1 tablet (360 mg) by mouth 2 times daily, Disp: 180 tablet, Rfl: 3     penicillin V (VEETID) 250 mg/5 mL suspension, Take 160-800 mg by mouth 3 TAB BEFORE AND 3 TAB AFTER DENTAL PROCEDURE, Disp: , Rfl:      rosuvastatin (CRESTOR) 5 MG tablet, Take 1 tablet (5 mg) by mouth daily, Disp: 90 tablet, Rfl: 3     tacrolimus (GENERIC EQUIVALENT) 0.5 MG capsule, Take 1 capsule  (0.5 mg) by mouth 2 times daily, Disp: 60 capsule, Rfl: 11     warfarin (COUMADIN) 2 MG tablet, , Disp: , Rfl:      glucagon (GLUCAGON EMERGENCY) 1 MG injection, Inject 1 mg subcutaneous. As  idrected  Indications: HYPOGLYCEMIC DISORDER, Disp: , Rfl:      nitroglycerin (NITROSTAT) 0.4 MG SL tablet, Place 0.4 mg under the tongue, Disp: , Rfl:      Omega-3 Fatty Acids (FISH OIL) 1000 MG CPDR, Take  by mouth. One capsule twice daily.  Indications: Arteriosclerotic Heart Disease, Disp: , Rfl:      Sodium Polystyrene Sulfonate (KAYEXALATE) POWD, Take 30 mls by mouth, Mix with water to equal 30 ml. Only take if Potassium level >5.5 (Patient not taking: Reported on 11/20/2018), Disp: 453 g, Rfl: 0     Allergies:   Gabapentin      Active Medical Problems:  Diabetes  CAD  Skin cancer  Oxalosis  Hypertension  Immunosuppressed      Social History:   . Former smoker. Occasionally consumes alcohol.     Physical Exam:   /67  Pulse 76  Wt 71.3 kg (157 lb 3.2 oz)  SpO2 98%  BMI 26.98 kg/m2   Wt Readings from Last 4 Encounters:   11/20/18 71.3 kg (157 lb 3.2 oz)   08/23/18 69.8 kg (153 lb 12.8 oz)   05/26/18 68.8 kg (151 lb 9.6 oz)   05/21/18 69.7 kg (153 lb 9.6 oz)       GENERAL APPEARANCE: alert and no distress  HENT: mouth without ulcers or lesions  LYMPHATICS: no cervical or supraclavicular nodes  RESP: lungs clear to auscultation - no rales, rhonchi or wheezes  CV: regular rhythm, normal rate, no rub, no murmur  EDEMA: no LE edema bilaterally  ABDOMEN: soft, nondistended, nontender, bowel sounds normal  MS: extremities normal - no gross deformities noted, no evidence of inflammation in joints, no muscle tenderness  SKIN: no rash  TX KIDNEY: normal      Data:     Renal Latest Ref Rng & Units 11/13/2018 8/15/2018 7/11/2018   Na 133 - 144 mmol/L 143 143 142   Na (external) 134 - 144 mmol/L - - -   K 3.4 - 5.3 mmol/L 4.4 4.5 5.2   K (external) 3.5 - 5.2 mmol/L - - -   Cl 94 - 109 mmol/L 110(H) 110(H) 109   Cl  (external) 96 - 106 mmol/L - - -   CO2 20 - 32 mmol/L 22 29 22   CO2 (external) 18 - 29 mmol/L - - -   BUN 7 - 30 mg/dL 45(H) 58(H) 50(H)   BUN (external) 8 - 27 mg/dL - - -   Cr 0.52 - 1.04 mg/dL 1.98(H) 2.15(H) 2.34(H)   Cr (external) 0.57 - 1.00 mg/dL - - -   Glucose 70 - 99 mg/dL 57(L) 54(L) 180(H)   Glucose (external) 65 - 99 mg/dL - - -   Ca  8.5 - 10.1 mg/dL 9.3 8.8 8.6   Ca (external) 8.7 - 10.3 mg/dL - - -   Mg 1.6 - 2.3 mg/dL - - -   Mg (external) 1.6 - 2.3 mg/dL - - -     Bone Health Latest Ref Rng & Units 4/10/2018 2/26/2018 11/20/2017   Phos 2.5 - 4.5 mg/dL 4.2 - 3.2   Phos (external) 2.5 - 4.5 mg/dL - 4 . 9(H) -   PTHi 12 - 72 pg/mL - - -   PTHi (external) 15 - 65 Pg/mL - 87(H) -   Vit D Def 20 - 75 ug/L - - -   Vit D Def (external) 30.0 - 100.0 ng/mL - 44.8 -     Heme Latest Ref Rng & Units 11/13/2018 8/15/2018 7/11/2018   WBC 4.0 - 11.0 10e9/L 5.2 5.3 5.6   WBC (external) 3.4 - 10.8 x10E3/uL - - -   Hgb 11.7 - 15.7 g/dL 10.3(L) 10.0(L) 8.6(L)   Hgb (external) 11.1 - 15.9 g/dL - - -   Plt 150 - 450 10e9/L 196 238 253   Plt (external) 150 - 379 x10E3/uL - - -     Liver Latest Ref Rng & Units 5/10/2018 4/10/2018 2/26/2018   AP 40 - 150 U/L 75 - -   AP (external) 39 - 117 IU/L - - 72   TBili 0.2 - 1.3 mg/dL 0.4 - -   TBili (external) 0 0-1.2 mg/dL - - 0.4   DBili 0.0 - 0.2 mg/dL 0.1 - -   DBili (external) 0.1 - 0.5 mg/dL - - -   ALT 0 - 50 U/L 32 - -   ALT (external) 0 - 32 IU/L - - 14   AST 0 - 45 U/L 20 - -   AST (external) 0 - 40 IU/L - - 21   Tot Protein 6.8 - 8.8 g/dL 6.7(L) - -   Tot Protein (external) 6.0 - 8.5 g/dL - - 6.5   Albumin 3.4 - 5.0 g/dL 3.4 3.8 -   Albumin (external) 3.5 - 4.8 g/dL - - 4.4     Pancreas Latest Ref Rng & Units 1/11/2017 4/8/2016 4/20/2015   A1C (external) <=6.4 % 6.6(H) 6.5(H) 7.2(H)     Iron studies Latest Ref Rng & Units 9/14/2018 1/20/2017   Iron 35 - 180 ug/dL 29(L) 46   Iron sat 15 - 46 % 10(L) 12(L)   Ferritin 8 - 252 ng/mL 102 36     [facility] Txp Virology Latest Ref  Rng & Units 2/26/2018 9/18/2017 9/16/2016   CVM DNA Quant - - Plasma, EDTA anticoagulant Plasma   CMV DNA Quant Ext NEGATIVE NEGATIVE - -   BK Spec - - - Plasma   BK Res BKNEG copies/mL - - BK Virus DNA Not Detected   BK Log <2.7 Log copies/mL - - Not Calculated   The Real-Time quantitative BK Virus assay was developed and its performance   characteristics determined by the [facility] Laboratory at   the [facility] in [place], [place]. The   primers and probes for each analyte are Analyte Specific Reagents (ASRs)   manufactured by [employer].   ASRs are used in many laboratory tests necessary for standard medical care and   generally do not require U.S. Food and Drug Administration approval. The FDA   has determined that such clearance or approval is not necessary.   This test is used for clinical purposes. It should not be regarded as   investigational or for research. This laboratory is certified under the   Clinical Laboratory Improvement Amendments of 1988 (CLIA-88) as qualified to   perform high complexity clinical laboratory testing.          Recent Labs   Lab Test  07/11/18   0916  08/15/18   0907  11/13/18   0903   DOSTAC  2100 on 07/10/2018  9:00 PM ON 8/14/18  9:00PM ON 11/12/18   TACROL  4.8*  3.6*  4.2*     Recent Labs   Lab Test  11/20/17   0925  05/10/18   0923  06/11/18   0911   DOSMPA  2115 ON 11/19/2017  2100  TAKEN 2100 ON [id number]   MPACID  2.24  1.31  0.71*   MPAG  132.0*  164.8*  105.5*         Scribe Disclosure:   I, Tano Leal, am serving as a scribe to document services personally performed by Jean Pierre Navarrete   at this visit, based upon the provider's statements to me. All documentation has been reviewed by the aforementioned provider prior to being entered into the official medical record.     Portions of this medical record were completed by a scribe. UPON MY REVIEW AND AUTHENTICATION BY ELECTRONIC SIGNATURE, this confirms (a) I performed  the applicable clinical services, and (b) the record is accurate.     Kidney/Pancreas Recipient

## 2018-11-20 NOTE — PROGRESS NOTES
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     # Hypertension: Controlled; Goal BP: < 130/80   - Changes: No    # Diabetes: Controlled    - Insulin regiment per primary care    # Anemia in chronic renal disease: Hgb: Stable, goal at approximately 10, but iron deficient. Saturation of 10%. No visible blood in stools. No black stools.     - Appointment for Iron infusion course has been placed. Will hold on her daily iron at this time.   - Will check stools samples after withholding oral iron.   - Will refer to GI based on stool studies.     # Mineral Bone Disorder:    - Secondary renal hyperparathyroidism; PTH level is: Acceptable for the degree of renal dysfunction   - Vitamin D; level is: Normal   - Calcium; level is: Normal   - Phosphorus; level is: normal on last check in 2018. Will need to be updated     # Electrolytes:   - Potassium; level: Normal  - Magnesium; level: Normal  - Bicarbonate; level: Normal    # Hyperuricemia    - Uric acid was 10 in 2018. Will start allopurinol     # Dyslipidemia    - On Crestor    # Pulmonary hypertension   - Controlled   - Stable diuretic regiment, will change bumex to daily dosing instead of every other day    # Irregular heartbeat   - On lifelong anticoagulation for atrial fibrillation and history of  DVT    # Skin Cancer Risk:    - Discussed sun protection and recommend regular follow up with Dermatology.    # Medical Compliance: Yes    Follow-up: Data Unavailable     # Transplant History:  Etiology of kidney failure: diabetes mellitus type 2  Tx: LDKT  Transplant: 8/26/2003 (Kidney)  Donor Type: Living Donor Class:   Significant changes in immunosuppression: None  Significant transplant-related complications: None    Transplant Office Phone Number: [phone]    Assessment and plan was discussed with the patient and they voiced understanding and agreement.    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 Patient was last evaluated by Dr. Palomino on 08/23/18, please refer to his note for further details. Today, patient reports she is confused with which medications she should be taking, such as her magnesium. She has discontinued her probiotics. Patient is not concerned today for any new skin cancers today, however reports that she does have new occurrence around her scalp every six months. Last colonoscopy was competed within the last six months and was normal. Does endorse slight heartburn today, but denies any abdominal pains, abdominal swelling, or black stools. Does takes iron supplementation twice daily. Has not completed a PAP smear in several years and has not completed a mammogram this year (does have a history of breast cancer from the early 1990s). She is very compliant about her yearly dermatologist appointments. Has received her flu vaccination this year. Has not completed the new shingrix vaccine as she has not been able to find a clinic which carries it.     Recent Hospitalizations:  [x] No [] Yes    New Medical Issues: [x] No [] Yes    Decreased energy: [x] No [] Yes    Chest pain or SOB with exertion:  [] No [x] Yes  Heartburn    Appetite change or weight change: [x] No [] Yes    Nausea, vomiting or diarrhea:  [x] No [] Yes    Fever, sweats or chills: [x] No [] Yes    Leg swelling: [] No [x] Yes  Wears compression stockings and takes bumex      Other medical issues:  No     Review of Systems:   A comprehensive review of systems was obtained and negative, except as noted in the HPI or past medical history.     Active Medications:   Current Outpatient Prescriptions:      acetaminophen (TYLENOL) 325 MG tablet, Take 325 mg by mouth as needed , Disp: , Rfl:      ASPIRIN PO, Take 81 mg by mouth daily, Disp: , Rfl:      blood glucose (ONE TOUCH ULTRA) test strip, Use to test blood glucose 10 times daily, Disp: , Rfl:      blood glucose monitoring (ONE TOUCH ULTRASOFT) lancets, Dispense item covered by pt ins. E11.9 IDDM type II - Test 4 times/day. Reason: Insulin Pump, Disp: , Rfl:      bumetanide (BUMEX) 0.5 MG tablet, Take 1-2 mg daily as needed for leg swelling, Disp: 360 tablet, Rfl: 3     Calcium Acetate, Phos Binder, 667 MG CAPS, Take 667 mg by mouth 3 times daily (with meals), Disp: 270 capsule, Rfl: 3     insulin lispro (HUMALOG) 100 UNIT/ML VIAL, Inject 100 Units Subcutaneous 3 times daily (before meals), Disp: , Rfl:      metoclopramide (REGLAN) 10 MG tablet, Take 10 mg by mouth as needed , Disp: , Rfl:      metoprolol (LOPRESSOR) 25 MG tablet, Take 1 tablet (25 mg) by mouth 2 times daily, Disp: 180 tablet, Rfl: 3     mycophenolic acid (GENERIC EQUIVALENT) 360 MG EC tablet, Take 1 tablet (360 mg) by mouth 2 times daily, Disp: 180 tablet, Rfl: 3     penicillin V (VEETID) 250 mg/5 mL suspension, Take 160-800 mg by mouth 3 TAB BEFORE AND 3 TAB AFTER DENTAL PROCEDURE, Disp: , Rfl:      rosuvastatin (CRESTOR) 5 MG tablet, Take 1 tablet (5 mg) by mouth daily, Disp: 90 tablet, Rfl: 3     tacrolimus (GENERIC EQUIVALENT) 0.5 MG capsule, Take 1 capsule (0.5 mg) by mouth 2 times daily, Disp: 60 capsule, Rfl: 11     warfarin (COUMADIN) 2  MG tablet, , Disp: , Rfl:      glucagon (GLUCAGON EMERGENCY) 1 MG injection, Inject 1 mg subcutaneous. As  idrected  Indications: HYPOGLYCEMIC DISORDER, Disp: , Rfl:      nitroglycerin (NITROSTAT) 0.4 MG SL tablet, Place 0.4 mg under the tongue, Disp: , Rfl:      Omega-3 Fatty Acids (FISH OIL) 1000 MG CPDR, Take  by mouth. One capsule twice daily.  Indications: Arteriosclerotic Heart Disease, Disp: , Rfl:      Sodium Polystyrene Sulfonate (KAYEXALATE) POWD, Take 30 mls by mouth, Mix with water to equal 30 ml. Only take if Potassium level >5.5 (Patient not taking: Reported on 11/20/2018), Disp: 453 g, Rfl: 0     Allergies:   Gabapentin      Active Medical Problems:  Diabetes  CAD  Skin cancer  Oxalosis  Hypertension  Immunosuppressed      Social History:   . Former smoker. Occasionally consumes alcohol.     Physical Exam:   /67  Pulse 76  Wt 71.3 kg (157 lb 3.2 oz)  SpO2 98%  BMI 26.98 kg/m2   Wt Readings from Last 4 Encounters:   11/20/18 71.3 kg (157 lb 3.2 oz)   08/23/18 69.8 kg (153 lb 12.8 oz)   05/26/18 68.8 kg (151 lb 9.6 oz)   05/21/18 69.7 kg (153 lb 9.6 oz)       GENERAL APPEARANCE: alert and no distress  HENT: mouth without ulcers or lesions  LYMPHATICS: no cervical or supraclavicular nodes  RESP: lungs clear to auscultation - no rales, rhonchi or wheezes  CV: regular rhythm, normal rate, no rub, no murmur  EDEMA: no LE edema bilaterally  ABDOMEN: soft, nondistended, nontender, bowel sounds normal  MS: extremities normal - no gross deformities noted, no evidence of inflammation in joints, no muscle tenderness  SKIN: no rash  TX KIDNEY: normal      Data:     Renal Latest Ref Rng & Units 11/13/2018 8/15/2018 7/11/2018   Na 133 - 144 mmol/L 143 143 142   Na (external) 134 - 144 mmol/L - - -   K 3.4 - 5.3 mmol/L 4.4 4.5 5.2   K (external) 3.5 - 5.2 mmol/L - - -   Cl 94 - 109 mmol/L 110(H) 110(H) 109   Cl (external) 96 - 106 mmol/L - - -   CO2 20 - 32 mmol/L 22 29 22   CO2 (external) 18 - 29  mmol/L - - -   BUN 7 - 30 mg/dL 45(H) 58(H) 50(H)   BUN (external) 8 - 27 mg/dL - - -   Cr 0.52 - 1.04 mg/dL 1.98(H) 2.15(H) 2.34(H)   Cr (external) 0.57 - 1.00 mg/dL - - -   Glucose 70 - 99 mg/dL 57(L) 54(L) 180(H)   Glucose (external) 65 - 99 mg/dL - - -   Ca  8.5 - 10.1 mg/dL 9.3 8.8 8.6   Ca (external) 8.7 - 10.3 mg/dL - - -   Mg 1.6 - 2.3 mg/dL - - -   Mg (external) 1.6 - 2.3 mg/dL - - -     Bone Health Latest Ref Rng & Units 4/10/2018 2/26/2018 11/20/2017   Phos 2.5 - 4.5 mg/dL 4.2 - 3.2   Phos (external) 2.5 - 4.5 mg/dL - 4 . 9(H) -   PTHi 12 - 72 pg/mL - - -   PTHi (external) 15 - 65 Pg/mL - 87(H) -   Vit D Def 20 - 75 ug/L - - -   Vit D Def (external) 30.0 - 100.0 ng/mL - 44.8 -     Heme Latest Ref Rng & Units 11/13/2018 8/15/2018 7/11/2018   WBC 4.0 - 11.0 10e9/L 5.2 5.3 5.6   WBC (external) 3.4 - 10.8 x10E3/uL - - -   Hgb 11.7 - 15.7 g/dL 10.3(L) 10.0(L) 8.6(L)   Hgb (external) 11.1 - 15.9 g/dL - - -   Plt 150 - 450 10e9/L 196 238 253   Plt (external) 150 - 379 x10E3/uL - - -     Liver Latest Ref Rng & Units 5/10/2018 4/10/2018 2/26/2018   AP 40 - 150 U/L 75 - -   AP (external) 39 - 117 IU/L - - 72   TBili 0.2 - 1.3 mg/dL 0.4 - -   TBili (external) 0 0-1.2 mg/dL - - 0.4   DBili 0.0 - 0.2 mg/dL 0.1 - -   DBili (external) 0.1 - 0.5 mg/dL - - -   ALT 0 - 50 U/L 32 - -   ALT (external) 0 - 32 IU/L - - 14   AST 0 - 45 U/L 20 - -   AST (external) 0 - 40 IU/L - - 21   Tot Protein 6.8 - 8.8 g/dL 6.7(L) - -   Tot Protein (external) 6.0 - 8.5 g/dL - - 6.5   Albumin 3.4 - 5.0 g/dL 3.4 3.8 -   Albumin (external) 3.5 - 4.8 g/dL - - 4.4     Pancreas Latest Ref Rng & Units 1/11/2017 4/8/2016 4/20/2015   A1C (external) <=6.4 % 6.6(H) 6.5(H) 7.2(H)     Iron studies Latest Ref Rng & Units 9/14/2018 1/20/2017   Iron 35 - 180 ug/dL 29(L) 46   Iron sat 15 - 46 % 10(L) 12(L)   Ferritin 8 - 252 ng/mL 102 36     [facility] Txp Virology Latest Ref Rng & Units 2/26/2018 9/18/2017 9/16/2016   CVM DNA Quant - - Plasma, EDTA anticoagulant  Plasma   CMV DNA Quant Ext NEGATIVE NEGATIVE - -   BK Spec - - - Plasma   BK Res BKNEG copies/mL - - BK Virus DNA Not Detected   BK Log <2.7 Log copies/mL - - Not Calculated   The Real-Time quantitative BK Virus assay was developed and its performance   characteristics determined by the [facility] at   the [facility] in [place], [place]. The   primers and probes for each analyte are Analyte Specific Reagents (ASRs)   manufactured by [employer].   ASRs are used in many laboratory tests necessary for standard medical care and   generally do not require U.S. Food and Drug Administration approval. The FDA   has determined that such clearance or approval is not necessary.   This test is used for clinical purposes. It should not be regarded as   investigational or for research. This laboratory is certified under the   Clinical Laboratory Improvement Amendments of 1988 (CLIA-88) as qualified to   perform high complexity clinical laboratory testing.          Recent Labs   Lab Test  07/11/18   0916  08/15/18   0907  11/13/18   0903   DOSTAC  2100 on 07/10/2018  9:00 PM ON 8/14/18  9:00PM ON 11/12/18   TACROL  4.8*  3.6*  4.2*     Recent Labs   Lab Test  11/20/17   0925  05/10/18   0923  06/11/18   0911   DOSMPA  2115 ON 11/19/2017  2100  TAKEN 2100 ON [id number]   MPACID  2.24  1.31  0.71*   MPAG  132.0*  164.8*  105.5*         Scribe Disclosure:   I, Tano Leal, am serving as a scribe to document services personally performed by Jean Pierre Navarrete   at this visit, based upon the provider's statements to me. All documentation has been reviewed by the aforementioned provider prior to being entered into the official medical record.     Portions of this medical record were completed by a scribe. UPON MY REVIEW AND AUTHENTICATION BY ELECTRONIC SIGNATURE, this confirms (a) I performed the applicable clinical services, and (b) the record is accurate.

## 2018-11-21 ENCOUNTER — TELEPHONE (OUTPATIENT)
Dept: NEPHROLOGY | Facility: CLINIC | Age: 73
End: 2018-11-21

## 2018-11-21 DIAGNOSIS — E83.42 HYPOMAGNESEMIA: ICD-10-CM

## 2018-11-21 DIAGNOSIS — E79.0 HYPERURICEMIA: ICD-10-CM

## 2018-11-21 DIAGNOSIS — Z94.0 KIDNEY REPLACED BY TRANSPLANT: Primary | ICD-10-CM

## 2018-11-21 DIAGNOSIS — E61.1 LOW IRON: ICD-10-CM

## 2018-11-21 PROBLEM — D64.9 ANEMIA: Status: ACTIVE | Noted: 2018-11-21

## 2018-11-21 RX ORDER — ALLOPURINOL 100 MG/1
100 TABLET ORAL DAILY
Qty: 90 TABLET | Refills: 3 | Status: SHIPPED | OUTPATIENT
Start: 2018-11-21 | End: 2019-05-21

## 2018-11-21 NOTE — TELEPHONE ENCOUNTER
Per clinic review with Dr. Navarrete:  To find out status on allopurinol - if she is not on this, to start Allopurinol 100mg/day.  Needs updated Mag script  Needs lab letter sent to her  Needs IV iron 750mg X 2 doses (2 weeks apart) - hold oral iron supplement during this time  Check occult stool 5 days after 1st iron infusion.       Patient is a 86y old  Female who presents with a chief complaint of Slurring of speech (06 Nov 2017 10:42)    pt seen in tele [x  ], reg med floor [   ], sitting on bed [x  ], chair at bedside [   ], a+o x3 [ x ],   lethargic [  ],    nad [x  ]        Allergies    No Known Allergies        Vitals    T(F): 97.7 (11-12-17 @ 05:11), Max: 99.4 (11-11-17 @ 17:24)  HR: 71 (11-12-17 @ 05:11) (60 - 88)  BP: 155/77 (11-12-17 @ 05:11) (118/79 - 155/77)  RR: 17 (11-12-17 @ 05:11) (15 - 18)  SpO2: 99% (11-12-17 @ 05:11) (99% - 100%)  Wt(kg): --  CAPILLARY BLOOD GLUCOSE          Labs                          Radiology Results      Meds    MEDICATIONS  (STANDING):  atorvastatin 40 milliGRAM(s) Oral at bedtime  fenofibrate Tablet 145 milliGRAM(s) Oral daily  metoprolol     tartrate 25 milliGRAM(s) Oral two times a day  pantoprazole    Tablet 40 milliGRAM(s) Oral before breakfast      MEDICATIONS  (PRN):      Physical Exam    Neuro :  no focal deficits  Respiratory: CTA B/L  CV: RRR, S1S2, no murmurs,   Abdominal: Soft, NT, ND +BS,  Extremities: No edema, + peripheral pulses    ASSESSMENT    s/p Slurred speech 2nd to acute/ subacute lacunar infarct in the right corona radiata/centrum semiovale likely 2nd to xarelto failure  h/o HTN (hypertension)  A fib   No significant past surgical history      PLAN    mri/ mra result with acute/ subacute cva noted  neuro f/u   hold coumadin 2nd to supratherapeutic inr   f/u inr in am and dose coumadin accordingly   cardio f/u noted  stress test in 4-6 wks  cont current meds  d/c plan when inr 2-3 Patient is a 86y old  Female who presents with a chief complaint of Slurring of speech (06 Nov 2017 10:42)    pt seen in tele [x  ], reg med floor [   ], sitting on bed [x  ], chair at bedside [   ], a+o x3 [ x ],   lethargic [  ],    nad [x  ]        Allergies    No Known Allergies        Vitals    T(F): 97.7 (11-12-17 @ 05:11), Max: 99.4 (11-11-17 @ 17:24)  HR: 71 (11-12-17 @ 05:11) (60 - 88)  BP: 155/77 (11-12-17 @ 05:11) (118/79 - 155/77)  RR: 17 (11-12-17 @ 05:11) (15 - 18)  SpO2: 99% (11-12-17 @ 05:11) (99% - 100%)  Wt(kg): --  CAPILLARY BLOOD GLUCOSE          Labs    INR: 4.97: Please note: New Critical Value: 5.0 ratio as of 1/2/14. ratio (11.12.17 @ 07:24)    Radiology Results      Meds    MEDICATIONS  (STANDING):  atorvastatin 40 milliGRAM(s) Oral at bedtime  fenofibrate Tablet 145 milliGRAM(s) Oral daily  metoprolol     tartrate 25 milliGRAM(s) Oral two times a day  pantoprazole    Tablet 40 milliGRAM(s) Oral before breakfast      MEDICATIONS  (PRN):      Physical Exam    Neuro :  no focal deficits  Respiratory: CTA B/L  CV: RRR, S1S2, no murmurs,   Abdominal: Soft, NT, ND +BS,  Extremities: No edema, + peripheral pulses    ASSESSMENT    s/p Slurred speech 2nd to acute/ subacute lacunar infarct in the right corona radiata/centrum semiovale likely 2nd to xarelto failure  h/o HTN (hypertension)  A fib   No significant past surgical history      PLAN    mri/ mra result with acute/ subacute cva noted  neuro f/u   hold coumadin 2nd to supratherapeutic inr   f/u inr in am and dose coumadin accordingly   cardio f/u noted  stress test in 4-6 wks  cont current meds  d/c plan when inr 2-3

## 2018-11-21 NOTE — TELEPHONE ENCOUNTER
Also, to check CD4 count as she is not on Bactrim.     Call placed to pt. She is in agreement with plan. All lab orders placed.

## 2018-11-21 NOTE — TELEPHONE ENCOUNTER
Pt has been scheduled for Injectafer infusion. She is aware. She has also been educated to hold iron tabs through duration of Injectafer infusions.   She has no further questions.

## 2018-11-23 DIAGNOSIS — D84.9 IMMUNOSUPPRESSION (H): ICD-10-CM

## 2018-11-23 RX ORDER — TACROLIMUS 0.5 MG/1
0.5 CAPSULE ORAL 2 TIMES DAILY
Qty: 60 CAPSULE | Refills: 11 | Status: SHIPPED | OUTPATIENT
Start: 2018-11-23 | End: 2019-01-22

## 2018-11-28 ENCOUNTER — INFUSION THERAPY VISIT (OUTPATIENT)
Dept: INFUSION THERAPY | Facility: CLINIC | Age: 73
End: 2018-11-28
Attending: INTERNAL MEDICINE
Payer: COMMERCIAL

## 2018-11-28 VITALS
RESPIRATION RATE: 16 BRPM | HEART RATE: 60 BPM | TEMPERATURE: 98.6 F | DIASTOLIC BLOOD PRESSURE: 79 MMHG | OXYGEN SATURATION: 98 % | SYSTOLIC BLOOD PRESSURE: 151 MMHG

## 2018-11-28 DIAGNOSIS — E61.1 LOW IRON: Primary | ICD-10-CM

## 2018-11-28 DIAGNOSIS — D64.9 ANEMIA: ICD-10-CM

## 2018-11-28 PROCEDURE — 96374 THER/PROPH/DIAG INJ IV PUSH: CPT

## 2018-11-28 PROCEDURE — 25000128 H RX IP 250 OP 636: Mod: ZF | Performed by: INTERNAL MEDICINE

## 2018-11-28 RX ADMIN — FERRIC CARBOXYMALTOSE INJECTION 750 MG: 50 INJECTION, SOLUTION INTRAVENOUS at 11:25

## 2018-11-28 NOTE — PATIENT INSTRUCTIONS
Dear Dixie Cunha    Thank you for choosing [facility] ([facility]) for your infusion.  The following information is a summary of our appointment as well as important reminders.          We look forward in seeing you on your next appointment here at [facility].  Please don t hesitate to call us at [phone] to reschedule any of your appointments or to speak with one of the [facility] registered nurses.  It was a pleasure taking care of you today.    Sincerely,    [facility]  [address] [place] [address]  [id number]  Phone:  [phone]    Ferric carboxymaltose injection  Brand Name: Injectafer  What is this medicine?  FERRIC CARBOXYMALTOSE (ferr-ik car-box-ee-mol-toes) is an iron complex. Iron is used to make healthy red blood cells, which carry oxygen and nutrients throughout the body. This medicine is used to treat anemia in people with chronic kidney disease or people who cannot take iron by mouth.  How should I use this medicine?  This medicine is for infusion into a vein. It is given by a health care professional in a hospital or clinic setting.  Talk to your pediatrician regarding the use of this medicine in children. Special care may be needed.  What side effects may I notice from receiving this medicine?  Side effects that you should report to your doctor or health care professional as soon as possible:    allergic reactions like skin rash, itching or hives, swelling of the face, lips, or tongue    dizziness    facial flushing  Side effects that usually do not require medical attention (report to your doctor or health care professional if they continue or are bothersome):    changes in taste    constipation    headache    nausea, vomiting    pain, redness, or irritation at site where injected  What may interact with this medicine?  Do not take this medicine with any of the following  medications:    deferoxamine    dimercaprol    other iron products  What if I miss a dose?  It is important not to miss your dose. Call your doctor or health care professional if you are unable to keep an appointment.  Where should I keep my medicine?  This drug is given in a hospital or clinic and will not be stored at home.  What should I tell my health care provider before I take this medicine?  They need to know if you have any of these conditions:    high levels of iron in the blood    liver disease    an unusual or allergic reaction to iron, other medicines, foods, dyes, or preservatives    pregnant or trying to get pregnant    breast-feeding  What should I watch for while using this medicine?  Visit your doctor or health care professional regularly. Tell your doctor if your symptoms do not start to get better or if they get worse. You may need blood work done while you are taking this medicine.  You may need to follow a special diet. Talk to your doctor. Foods that contain iron include: whole grains/cereals, dried fruits, beans, or peas, leafy green vegetables, and organ meats (liver, kidney).  NOTE:This sheet is a summary. It may not cover all possible information. If you have questions about this medicine, talk to your doctor, pharmacist, or health care provider. Copyright  2018 [employer]

## 2018-11-28 NOTE — MR AVS SNAPSHOT
After Visit Summary   11/28/2018    Dixie Cunha    MRN: [id number]           Patient Information     Date Of Birth          1945        Visit Information        Provider Department      11/28/2018 11:00 AM  45 ATC;  SPEC [facility] Specialty and Procedure        Today's Diagnoses     Low iron    -  1    Anemia          Care Instructions    Dear Dixie Cunha    Thank you for choosing [facility] ([facility]) for your infusion.  The following information is a summary of our appointment as well as important reminders.          We look forward in seeing you on your next appointment here at [facility].  Please don t hesitate to call us at [phone] to reschedule any of your appointments or to speak with one of the [facility] registered nurses.  It was a pleasure taking care of you today.    Sincerely,    [facility]  Specialty Infusion [facility] Procedure Center  [address] [place] [address]  [address]  Phone:  [phone]    Ferric carboxymaltose injection  Brand Name: Injectafer  What is this medicine?  FERRIC CARBOXYMALTOSE (ferr-ik car-box-ee-mol-toes) is an iron complex. Iron is used to make healthy red blood cells, which carry oxygen and nutrients throughout the body. This medicine is used to treat anemia in people with chronic kidney disease or people who cannot take iron by mouth.  How should I use this medicine?  This medicine is for infusion into a vein. It is given by a health care professional in a hospital or clinic setting.  Talk to your pediatrician regarding the use of this medicine in children. Special care may be needed.  What side effects may I notice from receiving this medicine?  Side effects that you should report to your doctor or health care professional as soon as possible:    allergic reactions like skin rash, itching or hives, swelling of the face,  lips, or tongue    dizziness    facial flushing  Side effects that usually do not require medical attention (report to your doctor or health care professional if they continue or are bothersome):    changes in taste    constipation    headache    nausea, vomiting    pain, redness, or irritation at site where injected  What may interact with this medicine?  Do not take this medicine with any of the following medications:    deferoxamine    dimercaprol    other iron products  What if I miss a dose?  It is important not to miss your dose. Call your doctor or health care professional if you are unable to keep an appointment.  Where should I keep my medicine?  This drug is given in a hospital or clinic and will not be stored at home.  What should I tell my health care provider before I take this medicine?  They need to know if you have any of these conditions:    high levels of iron in the blood    liver disease    an unusual or allergic reaction to iron, other medicines, foods, dyes, or preservatives    pregnant or trying to get pregnant    breast-feeding  What should I watch for while using this medicine?  Visit your doctor or health care professional regularly. Tell your doctor if your symptoms do not start to get better or if they get worse. You may need blood work done while you are taking this medicine.  You may need to follow a special diet. Talk to your doctor. Foods that contain iron include: whole grains/cereals, dried fruits, beans, or peas, leafy green vegetables, and organ meats (liver, kidney).  NOTE:This sheet is a summary. It may not cover all possible information. If you have questions about this medicine, talk to your doctor, pharmacist, or health care provider. Copyright  2018 Elsevier                Follow-ups after your visit        Your next 10 appointments already scheduled     Dec 03, 2018  1:00 PM CHATO   [facility]    [address]  [facility] [place] [id number] [phone]           Please do not eat 10-12 hours before your appointment if you are coming in fasting for labs on lipids, cholesterol, or glucose (sugar). This does not apply to pregnant women. Water, hot tea and black coffee (with nothing added) are okay. Do not drink other fluids, diet soda or chew gum.            Dec 05, 2018  2:00 PM CST   Infusion 120 with  SPEC INFUSION, UC 50 ATC   [facility] Specialty and Procedure ([facility])    [address]  [address]  [facility] [address] [phone]            May 21, 2019  2:05 PM CDT   (Arrive by 1:35 PM)   Return Kidney Transplant with  Kidney/Pancreas Recipient 1   [facility] Nephrology ([facility])    [address]  [address]  [facility] [address] [phone]              Who to contact     If you have questions or need follow up information about today's clinic visit or your schedule please contact [facility] SPECIALTY AND PROCEDURE directly at [phone].  Normal or non-critical lab and imaging results will be communicated to you by [employer], letter or phone within 4 business days after the clinic has received the results. If you do not hear from us within 7 days, please contact the clinic through [employer] or phone. If you have a critical or abnormal lab result, we will notify you by phone as soon as possible.  Submit refill requests through [employer] or call your pharmacy and they will forward the refill request to us. Please allow 3 business days for your refill to be completed.          Additional Information About Your Visit        [employer] Information     [employer] gives you secure access to your electronic health record. If you see a primary care provider, you can also send messages to your care team and make appointments. If you have questions, please call your primary care clinic.  If you  do not have a primary care provider, please call [phone] and they will assist you.        Care EveryWhere ID     This is your Care EveryWhere ID. This could be used by other organizations to access your [place] medical records  [id number]         Blood Pressure from Last 3 Encounters:   11/20/18 169/67   08/23/18 138/79   05/26/18 163/73    Weight from Last 3 Encounters:   11/20/18 71.3 kg (157 lb 3.2 oz)   08/23/18 69.8 kg (153 lb 12.8 oz)   05/26/18 68.8 kg (151 lb 9.6 oz)              Today, you had the following     No orders found for display       Primary Care Provider Office Phone # Fax #    Dilip Vidal [phone]       [facility] [address]  [facility] [address]        Equal Access to Services     [facility]: Hadii steven ku hadasho Soomaali, waaxda luqadaha, qaybta kaalmada adeegyada, shell lin . So [facility] [phone].    ATENCIÓN: Si habla español, tiene a zapata disposición servicios gratuitos de asistencia lingüística. Ursulamary jane al [phone].    We comply with applicable federal civil rights laws and [place] laws. We do not discriminate on the basis of race, color, national origin, age, disability, sex, sexual orientation, or gender identity.            Thank you!     Thank you for choosing [facility] SPECIALTY AND PROCEDURE  for your care. Our goal is always to provide you with excellent care. Hearing back from our patients is one way we can continue to improve our services. Please take a few minutes to complete the written survey that you may receive in the mail after your visit with us. Thank you!             Your Updated Medication List - Protect others around you: Learn how to safely use, store and throw away your medicines at www.disposemymeds.org.          This list is accurate as of 11/28/18 11:01 AM.  Always use your most recent med list.                   Brand Name Dispense Instructions for  use Diagnosis    acetaminophen 325 MG tablet    TYLENOL     Take 325 mg by mouth as needed        allopurinol 100 MG tablet    ZYLOPRIM    90 tablet    Take 1 tablet (100 mg) by mouth daily    Hyperuricemia       ASPIRIN PO      Take 81 mg by mouth daily        blood glucose monitoring lancets      Dispense item covered by pt ins. E11.9 IDDM type II - Test 4 times/day. Reason: Insulin Pump        bumetanide 0.5 MG tablet    BUMEX    360 tablet    Take 1-2 mg daily as needed for leg swelling    Edema, unspecified type       Calcium Acetate (Phos Binder) 667 MG Caps     270 capsule    Take 667 mg by mouth 3 times daily (with meals)    Kidney transplanted, Complications, kidney transplant       Fish Oil 1000 MG Cpdr      Take  by mouth. One capsule twice daily.  Indications: Arteriosclerotic Heart Disease        GLUCAGON EMERGENCY 1 MG kit   Generic drug:  glucagon      Inject 1 mg subcutaneous. As  idrected  Indications: HYPOGLYCEMIC DISORDER        insulin lispro 100 UNIT/ML vial    HumaLOG VIAL     Inject 100 Units Subcutaneous 3 times daily (before meals)        KAYEXALATE Powd     453 g    Take 30 mls by mouth, Mix with water to equal 30 ml. Only take if Potassium level >5.5    Hyperkalemia       magnesium oxide 400 (241.3 Mg) MG tablet    MAG-OX    180 tablet    Take 1 tablet by mouth 2 times daily    Hypomagnesemia       metoprolol tartrate 25 MG tablet    LOPRESSOR    180 tablet    Take 1 tablet (25 mg) by mouth 2 times daily    Renal hypertension, stage 1-4 or unspecified chronic kidney disease       mycophenolic acid 360 MG EC tablet    GENERIC EQUIVALENT    180 tablet    Take 1 tablet (360 mg) by mouth 2 times daily    Kidney replaced by transplant       nitroGLYcerin 0.4 MG sublingual tablet    NITROSTAT     Place 0.4 mg under the tongue        ONETOUCH ULTRA test strip   Generic drug:  blood glucose monitoring      Use to test blood glucose 10 times daily        penicillin V 250 mg/5 mL suspension     VEETID     Take 160-800 mg by mouth 3 TAB BEFORE AND 3 TAB AFTER DENTAL PROCEDURE        REGLAN 10 MG tablet   Generic drug:  metoclopramide      Take 10 mg by mouth as needed        rosuvastatin 5 MG tablet    CRESTOR    90 tablet    Take 1 tablet (5 mg) by mouth daily    Hyperlipidemia, unspecified hyperlipidemia type       tacrolimus 0.5 MG capsule    GENERIC EQUIVALENT    60 capsule    Take 1 capsule (0.5 mg) by mouth 2 times daily    Immunosuppression (H)       warfarin 2 MG tablet    COUMADIN

## 2018-11-28 NOTE — PROGRESS NOTES
Nursing Note  Dixie Cunha presents today [facility] for:   Chief Complaint   Patient presents with     Infusion     Injectafer     During today's [facility] appointment, orders from Dr. Navarrete were completed.  Frequency: today is dose 1 of 2 total.    Progress note:  Patient identification verified by name and date of birth.  Assessment completed.  Vitals recorded in Doc Flowsheets.  Patient was provided with education regarding infusion and possible side effects.  Patient verbalized understanding.      needed: No  Premedications: were not ordered.  Infusion Rates: 500 ml/hr.  Approximate Infusion length:15 minutes.   Labs: were not ordered for this appointment.  Vascular access: peripheral IV placed today.  Treatment Conditions: patient monitored for 30 minutes after medication was infused.  Patient tolerated infusion: well.    Drug Waste Record? No     Discharge Plan:   Follow up plan of care with: ongoing infusions at [facility]. and primary medical doctor.  Discharge instructions were reviewed with patient.  Patient/representative verbalized understanding of discharge instructions and all questions answered.  Patient discharged from [facility] in stable condition.    Brittni Hunt RN    Administrations This Visit     ferric carboxymaltose (INJECTAFER) 750 mg in sodium chloride 0.9 % 100 mL intermittent infusion     Admin Date Action Dose Rate Route Administered By          11/28/2018 New Bag 750 mg 500 mL/hr Intravenous Brittni Hunt RN                         BP (!) 152/103  Temp 98.6  F (37  C) (Oral)  Resp 16  SpO2 98%\

## 2018-12-03 DIAGNOSIS — Z94.0 KIDNEY REPLACED BY TRANSPLANT: ICD-10-CM

## 2018-12-03 PROCEDURE — 86359 T CELLS TOTAL COUNT: CPT | Performed by: INTERNAL MEDICINE

## 2018-12-03 PROCEDURE — 86360 T CELL ABSOLUTE COUNT/RATIO: CPT | Performed by: INTERNAL MEDICINE

## 2018-12-03 PROCEDURE — 36415 COLL VENOUS BLD VENIPUNCTURE: CPT | Performed by: INTERNAL MEDICINE

## 2018-12-04 LAB
CD3 CELLS # BLD: 508 CELLS/UL (ref 603–2990)
CD3 CELLS NFR BLD: 71 % (ref 49–84)
CD3+CD4+ CELLS # BLD: 240 CELLS/UL (ref 441–2156)
CD3+CD4+ CELLS NFR BLD: 34 % (ref 28–63)
CD3+CD4+ CELLS/CD3+CD8+ CLL BLD: 0.92 % (ref 1.4–2.6)
CD3+CD8+ CELLS # BLD: 267 CELLS/UL (ref 125–1312)
CD3+CD8+ CELLS NFR BLD: 37 % (ref 10–40)
IFC SPECIMEN: ABNORMAL

## 2018-12-05 ENCOUNTER — INFUSION THERAPY VISIT (OUTPATIENT)
Dept: INFUSION THERAPY | Facility: CLINIC | Age: 73
End: 2018-12-05
Attending: INTERNAL MEDICINE
Payer: COMMERCIAL

## 2018-12-05 VITALS
OXYGEN SATURATION: 95 % | SYSTOLIC BLOOD PRESSURE: 155 MMHG | DIASTOLIC BLOOD PRESSURE: 90 MMHG | RESPIRATION RATE: 16 BRPM | TEMPERATURE: 98.1 F | HEART RATE: 85 BPM

## 2018-12-05 DIAGNOSIS — N18.9 ANEMIA DUE TO CHRONIC KIDNEY DISEASE, UNSPECIFIED CKD STAGE: ICD-10-CM

## 2018-12-05 DIAGNOSIS — E61.1 LOW IRON: Primary | ICD-10-CM

## 2018-12-05 DIAGNOSIS — D63.1 ANEMIA DUE TO CHRONIC KIDNEY DISEASE, UNSPECIFIED CKD STAGE: ICD-10-CM

## 2018-12-05 PROCEDURE — 96365 THER/PROPH/DIAG IV INF INIT: CPT

## 2018-12-05 PROCEDURE — 25000128 H RX IP 250 OP 636: Mod: ZF | Performed by: INTERNAL MEDICINE

## 2018-12-05 RX ADMIN — FERRIC CARBOXYMALTOSE INJECTION 750 MG: 50 INJECTION, SOLUTION INTRAVENOUS at 14:27

## 2018-12-05 NOTE — MR AVS SNAPSHOT
After Visit Summary   12/5/2018    Dixie Cunha    MRN: [id number]           Patient Information     Date Of Birth          1945        Visit Information        Provider Department      12/5/2018 2:00 PM UC 50 ATC; UC SPEC INFUSION [facility] Specialty [facility] Procedure        Today's Diagnoses     Low iron    -  1    Anemia due to chronic kidney disease, unspecified CKD stage          Care Instructions    Dear Dixie Cunha    Thank you for choosing [facility] Specialty In[facility] ([facility]) for your infusion.  The following information is a summary of our appointment as well as important reminders.      Ferric carboxymaltose Solution for injection  What is this medicine?  FERRIC CARBOXYMALTOSE (ferr-ik car-box-ee-mol-toes) is an iron complex. Iron is used to make healthy red blood cells, which carry oxygen and nutrients throughout the body. This medicine is used to treat anemia in people with chronic kidney disease or people who cannot take iron by mouth.  This medicine may be used for other purposes; ask your health care provider or pharmacist if you have questions.  What should I tell my health care provider before I take this medicine?  They need to know if you have any of these conditions:    anemia not caused by low iron levels    high levels of iron in the blood    liver disease    an unusual or allergic reaction to iron, other medicines, foods, dyes, or preservatives    pregnant or trying to get pregnant    breast-feeding  How should I use this medicine?  This medicine is for infusion into a vein. It is given by a health care professional in a hospital or clinic setting.  Talk to your pediatrician regarding the use of this medicine in children. Special care may be needed.  Overdosage: If you think you've taken too much of this medicine contact a poison control center or emergency room at once.  NOTE: This medicine is  only for you. Do not share this medicine with others.  What if I miss a dose?  It is important not to miss your dose. Call your doctor or health care professional if you are unable to keep an appointment.  What may interact with this medicine?  Do not take this medicine with any of the following medications:  deferoxamine  dimercaprol  other iron products  This medicine may also interact with the following medications:  chloramphenicol  deferasirox  This list may not describe all possible interactions. Give your health care provider a list of all the medicines, herbs, non-prescription drugs, or dietary supplements you use. Also tell them if you smoke, drink alcohol, or use illegal drugs. Some items may interact with your medicine.  What should I watch for while using this medicine?  Visit your doctor or health care professional regularly. Tell your doctor if your symptoms do not start to get better or if they get worse. You may need blood work done while you are taking this medicine.  You may need to follow a special diet. Talk to your doctor. Foods that contain iron include: whole grains/cereals, dried fruits, beans, or peas, leafy green vegetables, and organ meats (liver, kidney).  What side effects may I notice from receiving this medicine?  Side effects that you should report to your doctor or health care professional as soon as possible:  allergic reactions like skin rash, itching or hives, swelling of the face, lips, or tonguebreathing problems  changes in blood pressure  feeling faint or lightheaded, falls  flushing, sweating, or hot feelings  Side effects that usually do not require medical attention (Report these to your doctor or health care professional if they continue or are bothersome.):  changes in taste  constipation  dizziness  headache  nausea  pain, redness, or irritation at site where injected  vomiting  This list may not describe all possible side effects. Call your doctor for medical advice  about side effects. You may report side effects to FDA at 6-741-FWT-1311.  Where should I keep my medicine?  This drug is given in a hospital or clinic and will not be stored at home.  NOTE: This sheet is a summary. It may not cover all possible information. If you have questions about this medicine, talk to your doctor, pharmacist, or health care provider.  NOTE:This sheet is a summary. It may not cover all possible information. If you have questions about this medicine, talk to your doctor, pharmacist, or health care provider. Copyright  2016 Gold Standard    We look forward in seeing you on your next appointment here at [facility].  Please don t hesitate to call us at [phone] to reschedule any of your appointments or to speak with one of the [facility] registered nurses.  It was a pleasure taking care of you today.    Sincerely,    [facility]  Specialty Infusion & Procedure Center  [address] [place]  [address]  Phone:  [phone]            Follow-ups after your visit        Your next 10 appointments already scheduled     May 21, 2019  2:05 PM CDT   (Arrive by 1:35 PM)   Return Kidney Transplant with  Kidney/Pancreas Recipient 1   [facility] Neph[facility] ([facility])    [address] [place] [address]  [address]  [facility] [address] [phone]              Who to contact     If you have questions or need follow up information about today's clinic visit or your schedule please contact [facility] SPECIALTY AND PROCEDURE directly at [phone].  Normal or non-critical lab and imaging results will be communicated to you by MyChart, letter or phone within 4 business days after the clinic has received the results. If you do not hear from us within 7 days, please contact the clinic through MyChart or phone. If you have a critical or abnormal lab result, we will notify you by phone as soon as possible.  Submit refill requests through  [employer] or call your pharmacy and they will forward the refill request to us. Please allow 3 business days for your refill to be completed.          Additional Information About Your Visit        MyChart Information     [employer] gives you secure access to your electronic health record. If you see a primary care provider, you can also send messages to your care team and make appointments. If you have questions, please call your primary care clinic.  If you do not have a primary care provider, please call [phone] and they will assist you.        Care EveryWhere ID     This is your Care EveryWhere ID. This could be used by other organizations to access your [place] medical records  [id number]        Your Vitals Were     Pulse Temperature Respirations Pulse Oximetry          85 98.1  F (36.7  C) (Oral) 16 95%         Blood Pressure from Last 3 Encounters:   12/05/18 165/85   11/28/18 151/79   11/20/18 169/67    Weight from Last 3 Encounters:   11/20/18 71.3 kg (157 lb 3.2 oz)   08/23/18 69.8 kg (153 lb 12.8 oz)   05/26/18 68.8 kg (151 lb 9.6 oz)              Today, you had the following     No orders found for display       Primary Care Provider Office Phone # Fax #    Dilip Vidal [phone]       [facility] [address]  [facility] [address]        Equal Access to Services     SRINIVAS MCGRARY : Hadii steven ku hadasho Soomaali, waaxda luqadaha, qaybta kaalmada shell lombardo. So [facility] [phone].    ATENCIÓN: Si habla español, tiene a zapata disposición servicios gratuitos de asistencia lingüística. Aurea al [phone].    We comply with applicable federal civil rights laws and [place] laws. We do not discriminate on the basis of race, color, national origin, age, disability, sex, sexual orientation, or gender identity.            Thank you!     Thank you for choosing [facility] SPECIALTY AND PROCEDURE  for your  care. Our goal is always to provide you with excellent care. Hearing back from our patients is one way we can continue to improve our services. Please take a few minutes to complete the written survey that you may receive in the mail after your visit with us. Thank you!             Your Updated Medication List - Protect others around you: Learn how to safely use, store and throw away your medicines at www.disposemymeds.org.          This list is accurate as of 12/5/18  3:11 PM.  Always use your most recent med list.                   Brand Name Dispense Instructions for use Diagnosis    acetaminophen 325 MG tablet    TYLENOL     Take 325 mg by mouth as needed        allopurinol 100 MG tablet    ZYLOPRIM    90 tablet    Take 1 tablet (100 mg) by mouth daily    Hyperuricemia       ASPIRIN PO      Take 81 mg by mouth daily        blood glucose monitoring lancets      Dispense item covered by pt ins. E11.9 IDDM type II - Test 4 times/day. Reason: Insulin Pump        bumetanide 0.5 MG tablet    BUMEX    360 tablet    Take 1-2 mg daily as needed for leg swelling    Edema, unspecified type       Calcium Acetate (Phos Binder) 667 MG Caps     270 capsule    Take 667 mg by mouth 3 times daily (with meals)    Kidney transplanted, Complications, kidney transplant       Fish Oil 1000 MG Cpdr      Take  by mouth. One capsule twice daily.  Indications: Arteriosclerotic Heart Disease        GLUCAGON EMERGENCY 1 MG kit   Generic drug:  glucagon      Inject 1 mg subcutaneous. As  idrected  Indications: HYPOGLYCEMIC DISORDER        insulin lispro 100 UNIT/ML vial    HumaLOG VIAL     Inject 100 Units Subcutaneous 3 times daily (before meals)        KAYEXALATE Powd     453 g    Take 30 mls by mouth, Mix with water to equal 30 ml. Only take if Potassium level >5.5    Hyperkalemia       magnesium oxide 400 (241.3 Mg) MG tablet    MAG-OX    180 tablet    Take 1 tablet by mouth 2 times daily    Hypomagnesemia       metoprolol tartrate 25 MG  tablet    LOPRESSOR    180 tablet    Take 1 tablet (25 mg) by mouth 2 times daily    Renal hypertension, stage 1-4 or unspecified chronic kidney disease       mycophenolic acid 360 MG EC tablet    GENERIC EQUIVALENT    180 tablet    Take 1 tablet (360 mg) by mouth 2 times daily    Kidney replaced by transplant       nitroGLYcerin 0.4 MG sublingual tablet    NITROSTAT     Place 0.4 mg under the tongue        ONETOUCH ULTRA test strip   Generic drug:  blood glucose monitoring      Use to test blood glucose 10 times daily        penicillin V 250 mg/5 mL suspension    VEETID     Take 160-800 mg by mouth 3 TAB BEFORE AND 3 TAB AFTER DENTAL PROCEDURE        REGLAN 10 MG tablet   Generic drug:  metoclopramide      Take 10 mg by mouth as needed        rosuvastatin 5 MG tablet    CRESTOR    90 tablet    Take 1 tablet (5 mg) by mouth daily    Hyperlipidemia, unspecified hyperlipidemia type       tacrolimus 0.5 MG capsule    GENERIC EQUIVALENT    60 capsule    Take 1 capsule (0.5 mg) by mouth 2 times daily    Immunosuppression (H)       warfarin 2 MG tablet    COUMADIN

## 2018-12-05 NOTE — PATIENT INSTRUCTIONS
Dear Dixie Cunha    Thank you for choosing [facility] ([facility]) for your infusion.  The following information is a summary of our appointment as well as important reminders.      Ferric carboxymaltose Solution for injection  What is this medicine?  FERRIC CARBOXYMALTOSE (ferr-ik car-box-ee-mol-toes) is an iron complex. Iron is used to make healthy red blood cells, which carry oxygen and nutrients throughout the body. This medicine is used to treat anemia in people with chronic kidney disease or people who cannot take iron by mouth.  This medicine may be used for other purposes; ask your health care provider or pharmacist if you have questions.  What should I tell my health care provider before I take this medicine?  They need to know if you have any of these conditions:    anemia not caused by low iron levels    high levels of iron in the blood    liver disease    an unusual or allergic reaction to iron, other medicines, foods, dyes, or preservatives    pregnant or trying to get pregnant    breast-feeding  How should I use this medicine?  This medicine is for infusion into a vein. It is given by a health care professional in a hospital or clinic setting.  Talk to your pediatrician regarding the use of this medicine in children. Special care may be needed.  Overdosage: If you think you've taken too much of this medicine contact a poison control center or emergency room at once.  NOTE: This medicine is only for you. Do not share this medicine with others.  What if I miss a dose?  It is important not to miss your dose. Call your doctor or health care professional if you are unable to keep an appointment.  What may interact with this medicine?  Do not take this medicine with any of the following medications:  deferoxamine  dimercaprol  other iron products  This medicine may also interact with the following  medications:  chloramphenicol  deferasirox  This list may not describe all possible interactions. Give your health care provider a list of all the medicines, herbs, non-prescription drugs, or dietary supplements you use. Also tell them if you smoke, drink alcohol, or use illegal drugs. Some items may interact with your medicine.  What should I watch for while using this medicine?  Visit your doctor or health care professional regularly. Tell your doctor if your symptoms do not start to get better or if they get worse. You may need blood work done while you are taking this medicine.  You may need to follow a special diet. Talk to your doctor. Foods that contain iron include: whole grains/cereals, dried fruits, beans, or peas, leafy green vegetables, and organ meats (liver, kidney).  What side effects may I notice from receiving this medicine?  Side effects that you should report to your doctor or health care professional as soon as possible:  allergic reactions like skin rash, itching or hives, swelling of the face, lips, or tonguebreathing problems  changes in blood pressure  feeling faint or lightheaded, falls  flushing, sweating, or hot feelings  Side effects that usually do not require medical attention (Report these to your doctor or health care professional if they continue or are bothersome.):  changes in taste  constipation  dizziness  headache  nausea  pain, redness, or irritation at site where injected  vomiting  This list may not describe all possible side effects. Call your doctor for medical advice about side effects. You may report side effects to FDA at 5-378-FDA-2600.  Where should I keep my medicine?  This drug is given in a hospital or clinic and will not be stored at home.  NOTE: This sheet is a summary. It may not cover all possible information. If you have questions about this medicine, talk to your doctor, pharmacist, or health care provider.  NOTE:This sheet is a summary. It may not cover all  possible information. If you have questions about this medicine, talk to your doctor, pharmacist, or health care provider. Copyright  2016 Gold Standard    We look forward in seeing you on your next appointment here at [facility].  Please don t hesitate to call us at [phone] to reschedule any of your appointments or to speak with one of the [facility] registered nurses.  It was a pleasure taking care of you today.    Sincerely,    [facility]  [address] [place] [address]  [address]  Phone:  [phone]

## 2018-12-05 NOTE — PROGRESS NOTES
Nursing Note  Dixie Cunha [facility] for:   Chief Complaint   Patient presents with     Infusion     Injectafer     During today's [facility] appointment, orders from Dr. Navarrete were completed.  Frequency: weekly X2. Today is dose 2 of 2 total.    Progress note:  Patient identification verified by name and date of birth.  Assessment completed.  Vitals recorded in Doc Flowsheets.  Patient was provided with education regarding infusion and possible side effects.  Patient verbalized understanding.      needed: No  Premedications: were not ordered.  Infusion Rates: 500 ml/hr.  Approximate Infusion length:15 minutes.   Labs: were not ordered for this appointment.  Vascular access: peripheral IV placed today.  Treatment Conditions: patient monitored for 30 minutes after medication was infused.  Patient tolerated infusion: well.    Staff message sent to Dr. Navarrete re: ongoing elevated blood pressures. Patient denied headache or dizziness, states she feels well today aside from bilateral lower extremity edema- taking Bumex.     Drug Waste Record? No     Discharge Plan:   AVS given to patient.   Follow up plan of care with: ongoing infusions at [facility]. and primary medical doctor.  Discharge instructions were reviewed with patient.  Patient/representative verbalized understanding of discharge instructions and all questions answered.  Patient discharged from [facility] in stable condition.    Delaney Carmihcael RN     Administrations This Visit     ferric carboxymaltose (INJECTAFER) 750 mg in sodium chloride 0.9 % 100 mL intermittent infusion     Admin Date Action Dose Rate Route Administered By          12/05/2018 New Bag 750 mg 500 mL/hr Intravenous Delaney Carmichael RN                       /85 (BP Location: Left arm)  Pulse 85  Temp 98.1  F (36.7  C) (Oral)  Resp 16   SpO2 95%

## 2018-12-11 ENCOUNTER — CARE COORDINATION (OUTPATIENT)
Dept: NEPHROLOGY | Facility: CLINIC | Age: 73
End: 2018-12-11

## 2018-12-18 ENCOUNTER — CARE COORDINATION (OUTPATIENT)
Dept: NEPHROLOGY | Facility: CLINIC | Age: 73
End: 2018-12-18

## 2018-12-18 NOTE — PROGRESS NOTES
Nephrology Note: Nursing Outreach Encounter    REASON FOR CALL:                                                      REASON FOR CALL: Blood Pressure Follow Up                                          SITUATION/BACKROUND:                                                    Patient is being treated for kidney transplant.    Had recently elevated BP readings during an iron infusion.      ASSESSMENT:                                                      Patient called with an update. States she feels just fine. She is now in [place] for the winter, and hasn't been checking BP as she previously hasn't had issues with it. Today her BP while sitting was 118/68 and standing was 109/51. Didn't write down her heart rate. She will periodically check BP and call if any concerns.    Uremic Symptoms: No       PLAN:                                                      Follow Up:   Patient to call/MyChart message with updates     Patient verbalized understanding and will contact the clinic with any further questions or concerns.     Debbie Olsen RN

## 2019-01-22 DIAGNOSIS — D84.9 IMMUNOSUPPRESSION (H): ICD-10-CM

## 2019-01-22 DIAGNOSIS — Z94.0 KIDNEY REPLACED BY TRANSPLANT: ICD-10-CM

## 2019-01-22 RX ORDER — TACROLIMUS 0.5 MG/1
0.5 CAPSULE ORAL 2 TIMES DAILY
Qty: 60 CAPSULE | Refills: 11 | Status: SHIPPED | OUTPATIENT
Start: 2019-01-22 | End: 2019-05-01

## 2019-01-22 RX ORDER — MYCOPHENOLIC ACID 360 MG/1
360 TABLET, DELAYED RELEASE ORAL 2 TIMES DAILY
Qty: 180 TABLET | Refills: 3 | Status: SHIPPED | OUTPATIENT
Start: 2019-01-22 | End: 2019-05-01

## 2019-01-22 NOTE — TELEPHONE ENCOUNTER
Patient Call: Medication Refill  Route to LPN  Instruct the patient to first contact their pharmacy. If they have called their pharmacy and require further assistance, route to LPN.    Pharmacy Name: [place], [place]  Pharmacy Location: [place], [place]  Name of Medication: MPA and Tacro (Will be out of MPA very soon, has a bout one week left of Tacro  When will the patient be out of this medication?: Less than 3 days (Route high priority)

## 2019-02-11 ENCOUNTER — TELEPHONE (OUTPATIENT)
Dept: TRANSPLANT | Facility: CLINIC | Age: 74
End: 2019-02-11

## 2019-02-11 DIAGNOSIS — I12.9 RENAL HYPERTENSION, STAGE 1-4 OR UNSPECIFIED CHRONIC KIDNEY DISEASE: ICD-10-CM

## 2019-02-11 RX ORDER — METOPROLOL TARTRATE 25 MG/1
25 TABLET, FILM COATED ORAL 2 TIMES DAILY
Qty: 180 TABLET | Refills: 3 | Status: SHIPPED | OUTPATIENT
Start: 2019-02-11 | End: 2020-02-03

## 2019-02-11 NOTE — TELEPHONE ENCOUNTER
Patient Call: Medication Refill  Route to LPN  Instruct the patient to first contact their pharmacy. If they have called their pharmacy and require further assistance, route to LPN.    Pharmacy Name:  [facility]/pharmacy #[id number] - [place], [place] - [address] [phone] (Phone)  [phone] (Fax)       Name of Medication: metoprolol (LOPRESSOR) 25 MG tablet  When will the patient be out of this medication?: Less than 3 days (Route high priority)     Pt call [facility] office for refill, please process refill.

## 2019-03-04 ENCOUNTER — TELEPHONE (OUTPATIENT)
Dept: TRANSPLANT | Facility: CLINIC | Age: 74
End: 2019-03-04

## 2019-03-04 DIAGNOSIS — Z94.0 KIDNEY TRANSPLANTED: Primary | ICD-10-CM

## 2019-03-04 NOTE — LETTER
PHYSICIAN ORDERS      DATE & TIME ISSUED: 2019 11:55 AM  PATIENT NAME: Dixie Cunha   : 1945     [facility] MR# [if applicable]: [id number]     DIAGNOSIS:  Kidney Transplan  ICD-10 CODE: Z94.0     Please draw the following lab today 3/5/19  BMP    Any questions please call: [facility] [phone]  Please fax results to [phone].      Jean Pierre Navarrete

## 2019-03-04 NOTE — TELEPHONE ENCOUNTER
ISSUE:  Creatinine 3.01, up from pt baseline 2-2.5  CO2 17  Glucose 191    PLAN:   Please call pt to ensure pt is drinking 2-3L/day, no new illness/fever/malaise/abdominal pain/edema, medication changes, or normal UOP. If the above is normal, encourage continued hydration and repeat labs this week. Please place order for BMP.  Has pt been having diarrhea?  Prior CO2 levels have been stable.  If pt hasn't been having diarrhea and level remains low on repeat, RNCC to review with MD team regarding starting sodium bicarb.   What have pt fasting glucose levels been?  Pt to f/u with PCP or endocrinologist regarding elevated glucose level.     OUTCOME:   Call placed to pt, no answer. Left message for pt to return call.

## 2019-03-05 NOTE — TELEPHONE ENCOUNTER
Additional call placed to pt regarding lab results, no answer.  Left message for pt to return call to review lab results.     ADDENDUM:  Pt returned call.  Pt aware her creatinine level elevated to 3.01.  Pt reports she had a BX done on her lip 3 days prior for SCC and reports she wasn't able to hydrate well after bx d/t pain.  Pt also reported having several episodes of diarrhea as well.  Pt reports since then her diarrhea has resolved and she has been able to resume her regular hydration getting 2-3 L/day.  Pt reports she is going in for labs today. Pt will have BMP level checked with today's labs.  Order sent to lab and pt.      Pt questions answered, pt v/u.

## 2019-03-06 NOTE — TELEPHONE ENCOUNTER
ISSUE:  Repeat creatinine trending down to baseline.  Now 2.6 from 3.  Baseline 2-2.5    PLAN:   Pt to cont hydrating well and plan on repeating level again in 2-3 weeks to ensure it returns to baseline.      OUTCOME:   Call placed to pt, no answer.  Left message for pt to return call to review lab results.

## 2019-03-06 NOTE — TELEPHONE ENCOUNTER
Returned pt phone call.  Pt aware creatinine trending down.  Pt will plan on cont hydration and will repeat level in 2 weeks.     Pt questions answered, pt v/u.

## 2019-03-19 ENCOUNTER — TELEPHONE (OUTPATIENT)
Dept: TRANSPLANT | Facility: CLINIC | Age: 74
End: 2019-03-19

## 2019-03-19 NOTE — TELEPHONE ENCOUNTER
Pt called and left VM stating she has increased nausea and acid reflux.  Pt requesting to have transplant labs repeated this week prior to her coming back to [place].     Call placed to pt, no answer.  Left message letting pt know orders will be sent to her lab in [place].  RNCC requested pt return call to give further details on s/s.     LPN TASK:  Will you please send an order for full set of transplant labs to pt [place] lab shireen.

## 2019-03-19 NOTE — LETTER
PHYSICIAN ORDERS      DATE & TIME ISSUED: 2019 10:31 AM  PATIENT NAME: Dixie Cunha   : 1945     [facility] MR# [if applicable]: [id number]     DIAGNOSIS:  Kidney transplant   ICD-10 CODE: Z94.0      Please complete the following lab within one week  Tacrolimus level ( ensure 12 hours between last dose and blood draw)  CBC with platelets   BMP    Any questions please call: [phone] option #5    Please fax results to [phone].      Jean Pierre Navarrete

## 2019-03-22 ENCOUNTER — TELEPHONE (OUTPATIENT)
Dept: TRANSPLANT | Facility: CLINIC | Age: 74
End: 2019-03-22

## 2019-03-22 NOTE — TELEPHONE ENCOUNTER
"Less urine output x 2 days ago.  Reports having a hard time hydrating d/t Lip Surgery end of February.  Notes swelling on luis legs. States it feels tight.  Denies redness and warmth. Denies SOB.   Unable to get BP, states she is in the car not at home.  Instructed to see PCP. States she does not want to see PCP in [place] and will wait when she comes home to [place].  States \"does not feel right.\"  Feels tired. Rechecking labs. BMP CMV, EBV, UA/UC, UPCR.    Lab orders faxed to:  [address] [place], [address]  Pt goes to [facility] [place], [place].   Phone [phone]    Fax [phone]      "

## 2019-03-22 NOTE — LETTER
PHYSICIAN ORDERS    DATE & TIME ISSUED: 2019 11:07 AM  PATIENT NAME: Dixie Cunha   : 1945     [facility] MR# [if applicable]: [id number]     DIAGNOSIS / ICD - 10 CODES    Kidney Transplanted (Z94.0)    After Care Following Organ Transplant (Z48.298)    Long Term Use of Medication (Z79.899)    Complications Kidney Transplant (T86.10)      Please complete the following labs:    Basic Metabolic Panel    CBC with differential    Mycophenolic acid (12 hr trough)    CMV PCR Quantitative    EBV PCR Quantitative    Urinalysis    Urine culture    Urine protein / creatinine ratio      Patient should release information to the [facility].   Please fax results to the Transplant Center at [phone].  Any questions please call [phone] or [phone].        Jean Pierre Navarrete MD

## 2019-03-22 NOTE — LETTER
PHYSICIAN ORDERS    DATE & TIME ISSUED: 2019 11:07 AM  PATIENT NAME: Dixie Cunha   : 1945     [facility] MR# [if applicable]: [id number]     DIAGNOSIS / ICD - 10 CODES    Kidney Transplanted (Z94.0)    After Care Following Organ Transplant (Z48.298)    Long Term Use of Medication (Z79.899)    Complications Kidney Transplant (T86.10)      Please complete the following labs:    Basic Metabolic Panel    CBC with differential    Mycophenolic acid (12 hr trough)    CMV PCR Quantitative    EBV PCR Quantitative    Urinalysis    Urine culture    Urine protein / creatinine ratio      Patient should release information to the [facility].   Please fax results to the Transplant Center at [phone].  Any questions please call [phone] or [phone].        Jean Pierre Navarrete MD

## 2019-03-22 NOTE — TELEPHONE ENCOUNTER
Ethan staton aVM 3/22/19 at 10:02am requesting to speak to her transplant coordinator when available

## 2019-05-01 DIAGNOSIS — Z94.0 KIDNEY REPLACED BY TRANSPLANT: ICD-10-CM

## 2019-05-01 DIAGNOSIS — D84.9 IMMUNOSUPPRESSION (H): ICD-10-CM

## 2019-05-01 RX ORDER — TACROLIMUS 0.5 MG/1
0.5 CAPSULE ORAL 2 TIMES DAILY
Qty: 180 CAPSULE | Refills: 3 | Status: SHIPPED | OUTPATIENT
Start: 2019-05-01

## 2019-05-01 RX ORDER — MYCOPHENOLIC ACID 360 MG/1
360 TABLET, DELAYED RELEASE ORAL 2 TIMES DAILY
Qty: 180 TABLET | Refills: 3 | Status: SHIPPED | OUTPATIENT
Start: 2019-05-01 | End: 2019-05-21

## 2019-05-01 NOTE — TELEPHONE ENCOUNTER
Patient Call: Medication Refill  Route to LPN  Instruct the patient to first contact their pharmacy. If they have called their pharmacy and require further assistance, route to LPN.    Pharmacy Name: [facility]   Pharmacy Location: [facility]  Name of Medication: Tacro Dose: 035 mg- 90 day supply and Mycophenolate 360  When will the patient be out of this medication?: Less than 24 hours (Carlin VEGAN, then page if no answer)

## 2019-05-07 ENCOUNTER — TELEPHONE (OUTPATIENT)
Dept: TRANSPLANT | Facility: CLINIC | Age: 74
End: 2019-05-07

## 2019-05-07 NOTE — TELEPHONE ENCOUNTER
Prior Authorization Specialty Medication Request    Medication/Dose:   mycophenolic acid (GENERIC EQUIVALENT) 360 MG EC tablet Take 1 tablet (360 mg) by mouth 2 times daily     ICD code (if different than what is on RX):  Z94.0  Previously Tried and Failed:      Important Lab Values:   Rationale:     Insurance Name: [employer]  Insurance ID:   Insurance Phone Number: [phone]    Pharmacy Information (if different than what is on RX)  Name:  [facility]  Phone:  [phone]

## 2019-05-08 NOTE — TELEPHONE ENCOUNTER
[facility] Prior Authorization Team   Phone: [phone]  Fax: [phone]    Prior Authorization Not Needed per Insurance    Medication: MYCOPHENOLIC - NO PA NEEDED/PART B COVERED  Insurance Company:    Expected CoPay:      Pharmacy Filling the Rx:    Pharmacy Notified:    Patient Notified:        Prior authorization is not required due to coverage is available under Medicare Part-B benefits.  [phone] has already filled it on 5/1/19

## 2019-05-14 DIAGNOSIS — Z94.0 KIDNEY TRANSPLANTED: ICD-10-CM

## 2019-05-14 LAB
ANION GAP SERPL CALCULATED.3IONS-SCNC: 7 MMOL/L (ref 3–14)
BUN SERPL-MCNC: 52 MG/DL (ref 7–30)
CALCIUM SERPL-MCNC: 8.7 MG/DL (ref 8.5–10.1)
CHLORIDE SERPL-SCNC: 111 MMOL/L (ref 94–109)
CO2 SERPL-SCNC: 23 MMOL/L (ref 20–32)
CREAT SERPL-MCNC: 2.53 MG/DL (ref 0.52–1.04)
GFR SERPL CREATININE-BSD FRML MDRD: 18 ML/MIN/{1.73_M2}
GLUCOSE SERPL-MCNC: 109 MG/DL (ref 70–99)
POTASSIUM SERPL-SCNC: 4.7 MMOL/L (ref 3.4–5.3)
SODIUM SERPL-SCNC: 141 MMOL/L (ref 133–144)

## 2019-05-14 PROCEDURE — 80048 BASIC METABOLIC PNL TOTAL CA: CPT | Performed by: INTERNAL MEDICINE

## 2019-05-14 PROCEDURE — 36415 COLL VENOUS BLD VENIPUNCTURE: CPT | Performed by: INTERNAL MEDICINE

## 2019-05-21 ENCOUNTER — OFFICE VISIT (OUTPATIENT)
Dept: NEPHROLOGY | Facility: CLINIC | Age: 74
End: 2019-05-21
Attending: INTERNAL MEDICINE
Payer: COMMERCIAL

## 2019-05-21 VITALS
DIASTOLIC BLOOD PRESSURE: 70 MMHG | WEIGHT: 152 LBS | OXYGEN SATURATION: 100 % | SYSTOLIC BLOOD PRESSURE: 128 MMHG | HEART RATE: 74 BPM | BODY MASS INDEX: 26.09 KG/M2

## 2019-05-21 DIAGNOSIS — Z48.298 AFTERCARE FOLLOWING ORGAN TRANSPLANT: Primary | ICD-10-CM

## 2019-05-21 DIAGNOSIS — Z94.0 KIDNEY TRANSPLANTED: ICD-10-CM

## 2019-05-21 DIAGNOSIS — D63.1 ANEMIA DUE TO CHRONIC KIDNEY DISEASE, UNSPECIFIED CKD STAGE: ICD-10-CM

## 2019-05-21 DIAGNOSIS — D64.9 ANEMIA, UNSPECIFIED TYPE: ICD-10-CM

## 2019-05-21 DIAGNOSIS — Z94.0 KIDNEY TRANSPLANTED: Primary | ICD-10-CM

## 2019-05-21 DIAGNOSIS — D84.9 IMMUNOSUPPRESSION (H): ICD-10-CM

## 2019-05-21 DIAGNOSIS — E79.0 HYPERURICEMIA: ICD-10-CM

## 2019-05-21 DIAGNOSIS — E61.1 LOW IRON: ICD-10-CM

## 2019-05-21 DIAGNOSIS — N18.9 ANEMIA DUE TO CHRONIC KIDNEY DISEASE, UNSPECIFIED CKD STAGE: ICD-10-CM

## 2019-05-21 DIAGNOSIS — R82.992: ICD-10-CM

## 2019-05-21 DIAGNOSIS — R60.9 EDEMA, UNSPECIFIED TYPE: ICD-10-CM

## 2019-05-21 DIAGNOSIS — Z94.0 KIDNEY REPLACED BY TRANSPLANT: ICD-10-CM

## 2019-05-21 DIAGNOSIS — T86.10 COMPLICATION OF TRANSPLANTED KIDNEY, UNSPECIFIED COMPLICATION: ICD-10-CM

## 2019-05-21 PROCEDURE — G0463 HOSPITAL OUTPT CLINIC VISIT: HCPCS | Mod: ZF

## 2019-05-21 RX ORDER — MYCOPHENOLIC ACID 180 MG/1
180 TABLET, DELAYED RELEASE ORAL 2 TIMES DAILY
Qty: 60 TABLET | Refills: 11 | Status: SHIPPED | OUTPATIENT
Start: 2019-05-21 | End: 2019-07-18

## 2019-05-21 RX ORDER — PANCRELIPASE 36000; 180000; 114000 [USP'U]/1; [USP'U]/1; [USP'U]/1
1 CAPSULE, DELAYED RELEASE PELLETS ORAL
Refills: 0 | COMMUNITY
Start: 2019-05-17

## 2019-05-21 RX ORDER — CHOLESTYRAMINE 4 G/9G
4 POWDER, FOR SUSPENSION ORAL
COMMUNITY
Start: 2019-05-14

## 2019-05-21 ASSESSMENT — PAIN SCALES - GENERAL: PAINLEVEL: NO PAIN (0)

## 2019-05-21 NOTE — LETTER
5/21/2019    RE: Dixie Thompsoneduardo  [address]  [facility] [address]     CHRONIC TRANSPLANT NEPHROLOGY VISIT    Assessment & Plan   # LDKT: Variable, likely related to hydration with diuretics and diarrhea.   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    # Anemia in Chronic Renal Disease: Hgb: Stable      DANNA: No   - Iron studies: Was low 6 months ago, received IV iron, will recheck. If normal, will stop supplements    # Mineral Bone Disorder:   - Secondary renal hyperparathyroidism; PTH level: Not checked recently  - Vitamin D; level: Not checked recently  - Calcium; level: Normal  - Phosphorus; level: Will check; if normal will stop Calcium Acetate     # Electrolytes:   - Potassium; level: Normal    # Elevated Uric Acid Level: Likely secondary to diuretics, will stop Allopurinol as she does not have a history of gout.    # Diarrhea: Patient followed up by DOMINIK MASTERS, recently started on Creon for pancreatic insufficiency. The pancreatic elastase was mildly low at 141. I suspect the diarrhea may be do to Myfortic. Will reduce Myfortic per above.     # Skin Cancer Risk:    - Discussed sun protection and recommend regular follow up with Dermatology.    # Medical Compliance: Yes.    # Transplant History:  Etiology of kidney failure: diabetes mellitus type 2  Tx:  LDKT  Transplant: 8/26/2003 (Kidney)  Donor Type: Living      Donor Class:   Significant changes in immunosuppression: None  Significant transplant-related complications: None    Transplant Office Phone Number: [phone]    Assessment and plan was discussed with the patient and she voiced her understanding and agreement.    Return visit: Return in about 6 months (around 11/21/2019).      Chief Complaint   Ms. Cunha is a 73 year old here for routine follow up.    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 Patient was last evaluated by Dr. Navarrete on 11/20/2018. At that time no changes made to medications. Please refer to note for further details.      Today, the patient reports she has had chronic diarrhea for the past year that occurs every time she eats. She states she does not check the diarrhea closely so she does not know if it is oily. She reports dietary limitations secondary to the diarrhea, which she expresses frustrations about. She has been evaluated by GI recently and was found to have pancreatic insufficiency and started on Creon a few days ago. In the past, she was on Imuran, however she had significant increase in sin cancer occurrence, so this was then switched to Rapamune. This was finally switched to Myfortic, which she has been on for some time. She recently went to the dermatologist and had some very small lesions removed from the top of her head. She reports she wears sunscreen and hats to protect herself from the sun. She would like to decrease the amount of medications she is taking if possible. She reports consistent blood pressures in the 120's. She also reports a blood clot last year but denies recent issues. She endorses bilateral lower extremity edema. She denies chest pain and shortness of breath, weight change, appetite change, or  fevers.    Recent Hospitalizations:  [x] No [] Yes    New Medical Issues: [] No [x] Yes Diarrhea   Decreased energy: [x] No [] Yes    Chest pain or SOB with exertion:  [x] No [] Yes    Appetite change or weight change: [] No [x] Yes Dietary limitations secondary to diarrhea. No weight change.    Nausea, vomiting or diarrhea:  [] No [x] Yes Chronic diarrhea, no nausea or vomiting   Fever, sweats or chills: [x] No [] Yes    Leg swelling: [] No [x] Yes      Home BP: 120's systolic    Review of Systems   A comprehensive review of systems was obtained and negative, except as noted in the HPI or PMH.    Problem List   Patient Active Problem List   Diagnosis     Diabetes (H)     Kidney replaced by transplant     CAD (coronary artery disease)     S/P CABG (coronary artery bypass graft)     Skin cancer     Secondary oxalosis (H)     Hypertension     Aftercare following organ transplant     Immunosuppression (H)     Low iron     Anemia       Social History   Social History     Tobacco Use     Smoking status: Former Smoker     Smokeless tobacco: Never Used   Substance Use Topics     Alcohol use: Yes     Drug use: No       Allergies   Allergies   Allergen Reactions     Gabapentin Swelling       Medications   Current Outpatient Medications   Medication Sig     acetaminophen (TYLENOL) 325 MG tablet Take 325 mg by mouth as needed      ASPIRIN PO Take 81 mg by mouth daily     blood glucose (ONE TOUCH ULTRA) test strip Use to test blood glucose 10 times daily     blood glucose monitoring (ONE TOUCH ULTRASOFT) lancets Dispense item covered by pt ins. E11.9 IDDM type II - Test 4 times/day. Reason: Insulin Pump     bumetanide (BUMEX) 0.5 MG tablet Take 1-2 mg daily as needed for leg swelling     Calcium Acetate, Phos Binder, 667 MG CAPS Take 667 mg by mouth 3 times daily (with meals)     cholestyramine (QUESTRAN) 4 g packet Take 4 g by mouth 3 times daily (with meals)     CREON 52169 units CPEP per EC capsule Take 1 capsule by mouth 3  times daily (with meals)     glucagon (GLUCAGON EMERGENCY) 1 MG injection Inject 1 mg subcutaneous. As  idrected  Indications: HYPOGLYCEMIC DISORDER     insulin lispro (HUMALOG) 100 UNIT/ML VIAL Inject 100 Units Subcutaneous 3 times daily (before meals)     metoclopramide (REGLAN) 10 MG tablet Take 10 mg by mouth as needed      metoprolol tartrate (LOPRESSOR) 25 MG tablet Take 1 tablet (25 mg) by mouth 2 times daily     mycophenolic acid (GENERIC EQUIVALENT) 180 MG EC tablet Take 1 tablet (180 mg) by mouth 2 times daily     penicillin V (VEETID) 250 mg/5 mL suspension Take 160-800 mg by mouth 3 TAB BEFORE AND 3 TAB AFTER DENTAL PROCEDURE     rosuvastatin (CRESTOR) 5 MG tablet Take 1 tablet (5 mg) by mouth daily     Sodium Polystyrene Sulfonate (KAYEXALATE) POWD Take 30 mls by mouth, Mix with water to equal 30 ml. Only take if Potassium level >5.5     tacrolimus (GENERIC EQUIVALENT) 0.5 MG capsule Take 1 capsule (0.5 mg) by mouth 2 times daily     warfarin (COUMADIN) 2 MG tablet      nitroglycerin (NITROSTAT) 0.4 MG SL tablet Place 0.4 mg under the tongue     Omega-3 Fatty Acids (FISH OIL) 1000 MG CPDR Take  by mouth. One capsule twice daily.  Indications: Arteriosclerotic Heart Disease     No current facility-administered medications for this visit.      Medications Discontinued During This Encounter   Medication Reason     magnesium oxide (MAG-OX) 400 (241.3 Mg) MG tablet      allopurinol (ZYLOPRIM) 100 MG tablet      mycophenolic acid (GENERIC EQUIVALENT) 360 MG EC tablet        Physical Exam   Vital Signs: /70   Pulse 74   Wt 68.9 kg (152 lb)   SpO2 100%   BMI 26.09 kg/m       GENERAL APPEARANCE: alert and no distress  HENT: mouth without ulcers or lesions  LYMPHATICS: no cervical or supraclavicular nodes  RESP: lungs clear to auscultation - no rales, rhonchi or wheezes  CV: regular rhythm, normal rate, no rub, no murmur  EDEMA: LE edema bilaterally  ABDOMEN: soft, nondistended, nontender, bowel sounds  normal  MS: extremities normal - no gross deformities noted, no evidence of inflammation in joints, no muscle tenderness  SKIN: no rash      Data     Renal Latest Ref Rng & Units 5/14/2019 3/5/2019 2/22/2019   Na 133 - 144 mmol/L 141 - -   Na (external) 134 - 144 mmol/L - 139 142   K 3.4 - 5.3 mmol/L 4.7 - -   K (external) 3.5 - 5.2 mmol/L - 5.1 4.0   Cl 94 - 109 mmol/L 111(H) - -   Cl (external) 96 - 106 mmol/L - 103 105   CO2 20 - 32 mmol/L 23 - -   CO2 (external) 20 - 29 mmol/L - 20 17(L)   BUN 7 - 30 mg/dL 52(H) - -   BUN (external) 8 - 27 mg/dL - 74(H) 76(HH)   Cr 0.52 - 1.04 mg/dL 2.53(H) - -   Cr (external) 0.57 - 1.00 mg/dL - 2.69(H) 3.01(H)   Glucose 70 - 99 mg/dL 109(H) - -   Glucose (external) 65 - 99 mg/dL - 170(H) 191(H)   Ca  8.5 - 10.1 mg/dL 8.7 - -   Ca (external) 8.7 - 10.3 mg/dL - 9.3 9.6   Mg 1.6 - 2.3 mg/dL - - -   Mg (external) 1.6 - 2.3 mg/dL - - -     Bone Health Latest Ref Rng & Units 4/10/2018 2/26/2018 11/20/2017   Phos 2.5 - 4.5 mg/dL 4.2 - 3.2   Phos (external) 2.5 - 4.5 mg/dL - 4 . 9(H) -   PTHi 12 - 72 pg/mL - - -   PTHi (external) 15 - 65 Pg/mL - 87(H) -   Vit D Def 20 - 75 ug/L - - -   Vit D Def (external) 30.0 - 100.0 ng/mL - 44.8 -     Heme Latest Ref Rng & Units 2/22/2019 11/13/2018 8/15/2018   WBC 4.0 - 11.0 10e9/L - 5.2 5.3   WBC (external) 3.4 - 10.8 x10E3/uL 7.8(L) - -   Hgb 11.7 - 15.7 g/dL - 10.3(L) 10.0(L)   Hgb (external) 11.1 - 15.9 g/dL 10.9(L) - -   Plt 150 - 450 10e9/L - 196 238   Plt (external) 150 - 379 X10E3/uL 177(L) - -     Liver Latest Ref Rng & Units 5/10/2018 4/10/2018 2/26/2018   AP 40 - 150 U/L 75 - -   AP (external) 39 - 117 IU/L - - 72   TBili 0.2 - 1.3 mg/dL 0.4 - -   TBili (external) 0 0-1.2 mg/dL - - 0.4   DBili 0.0 - 0.2 mg/dL 0.1 - -   DBili (external) 0.1 - 0.5 mg/dL - - -   ALT 0 - 50 U/L 32 - -   ALT (external) 0 - 32 IU/L - - 14   AST 0 - 45 U/L 20 - -   AST (external) 0 - 40 IU/L - - 21   Tot Protein 6.8 - 8.8 g/dL 6.7(L) - -   Tot Protein  (external) 6.0 - 8.5 g/dL - - 6.5   Albumin 3.4 - 5.0 g/dL 3.4 3.8 -   Albumin (external) 3.5 - 4.8 g/dL - - 4.4     Pancreas Latest Ref Rng & Units 1/11/2017 4/8/2016 4/20/2015   A1C (external) <=6.4 % 6.6(H) 6.5(H) 7.2(H)     Iron studies Latest Ref Rng & Units 9/14/2018 1/20/2017   Iron 35 - 180 ug/dL 29(L) 46   Iron sat 15 - 46 % 10(L) 12(L)   Ferritin 8 - 252 ng/mL 102 36     [facility] Txp Virology Latest Ref Rng & Units 2/26/2018 9/18/2017 9/16/2016   CVM DNA Quant - - Plasma, EDTA anticoagulant Plasma   CMV DNA Quant Ext NEGATIVE NEGATIVE - -   BK Spec - - - Plasma   BK Res BKNEG copies/mL - - BK Virus DNA Not Detected   BK Log <2.7 Log copies/mL - - Not Calculated   The Real-Time quantitative BK Virus assay was developed and its performance   characteristics determined by the Infectious Diseases Diagnostic Laboratory at   the [facility] in [place], [place]. The   primers and probes for each analyte are Analyte Specific Reagents (ASRs)   manufactured by [employer].   ASRs are used in many laboratory tests necessary for standard medical care and   generally do not require U.S. Food and Drug Administration approval. The FDA   has determined that such clearance or approval is not necessary.   This test is used for clinical purposes. It should not be regarded as   investigational or for research. This laboratory is certified under the   Clinical Laboratory Improvement Amendments of 1988 (CLIA-88) as qualified to   perform high complexity clinical laboratory testing.          Recent Labs   Lab Test 07/11/18  0916 08/15/18  0907 11/13/18  0903   DOSTAC 2100 on 07/10/2018 9:00 PM ON 8/14/18 9:00PM ON 11/12/18   TACROL 4.8* 3.6* 4.2*     Recent Labs   Lab Test 11/20/17  0925 05/10/18  0923 06/11/18  0911   DOSMPA 2115 ON 11/19/2017 2,100 TAKEN 2100 ON [id number]   MPACID 2.24 1.31 0.71*   MPAG 132.0* 164.8* 105.5*     Scribe Disclosure:   I, Emelia Wyman, am serving as a scribe to document  services personally performed by Kidney/Pancreas Recipient at this visit, based upon the provider's statements to me. All documentation has been reviewed by the aforementioned provider prior to being entered into the official medical record.       Kidney/Pancreas Recipient

## 2019-05-21 NOTE — PROGRESS NOTES
CHRONIC TRANSPLANT NEPHROLOGY VISIT    Assessment & Plan   # LDKT: Variable, likely related to hydration with diuretics and diarrhea.   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    # Anemia in Chronic Renal Disease: Hgb: Stable      DANNA: No   - Iron studies: Was low 6 months ago, received IV iron, will recheck. If normal, will stop supplements    # Mineral Bone Disorder:   - Secondary renal hyperparathyroidism; PTH level: Not checked recently  - Vitamin D; level: Not checked recently  - Calcium; level: Normal  - Phosphorus; level: Will check; if normal will stop Calcium Acetate     # Electrolytes:   - Potassium; level: Normal    # Elevated Uric Acid Level: Likely secondary to diuretics, will stop Allopurinol as she does not have a history of gout.    # Diarrhea: Patient followed up by DOMINIK MASTERS, recently started on Creon for pancreatic insufficiency. The pancreatic elastase was mildly low at 141. I suspect the diarrhea may be do to Myfortic. Will reduce Myfortic per above.     # Skin Cancer Risk:    - Discussed sun protection and recommend regular follow up with Dermatology.    # Medical Compliance: Yes.    # Transplant History:  Etiology of kidney failure: diabetes mellitus type 2  Tx: LDKT  Transplant: 8/26/2003 (Kidney)  Donor Type: Living      Donor Class:   Significant  changes in immunosuppression: None  Significant transplant-related complications: None    Transplant Office Phone Number: [phone]    Assessment and plan was discussed with the patient and she voiced her understanding and agreement.    Return visit: Return in about 6 months (around 11/21/2019).      Chief Complaint   Ms. Cunha is a 73 year old here for routine follow up.    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 Patient was last evaluated by Dr. Navarrete on 11/20/2018. At that time no changes made to medications. Please refer to note for further details.      Today, the patient reports she has had chronic diarrhea for the past year that occurs every time she eats. She states she does not check the diarrhea closely so she does not know if it is oily. She reports dietary limitations secondary to the diarrhea, which she expresses frustrations about. She has been evaluated by GI recently and was found to have pancreatic insufficiency and started on Creon a few days ago. In the past, she was on Imuran, however she had significant increase in sin cancer occurrence, so this was then switched to Rapamune. This was finally switched to Myfortic, which she has been on for some time. She recently went to the dermatologist and had some very small lesions removed from the top of her head. She reports she wears sunscreen and hats to protect herself from the sun. She would like to decrease the amount of medications she is taking if possible. She reports consistent blood pressures in the 120's. She also reports a blood clot last year but denies recent issues. She endorses bilateral lower extremity edema. She denies chest pain and shortness of breath, weight change, appetite change, or fevers.    Recent Hospitalizations:  [x] No [] Yes    New Medical Issues: [] No [x] Yes Diarrhea    Decreased energy: [x] No [] Yes    Chest pain or SOB with exertion:  [x] No [] Yes    Appetite change or weight change: [] No [x] Yes Dietary limitations secondary to diarrhea. No weight change.    Nausea, vomiting or diarrhea:  [] No [x] Yes Chronic diarrhea, no nausea or vomiting   Fever, sweats or chills: [x] No [] Yes    Leg swelling: [] No [x] Yes      Home BP: 120's systolic    Review of Systems   A comprehensive review of systems was obtained and negative, except as noted in the HPI or PMH.    Problem List   Patient Active Problem List   Diagnosis     Diabetes (H)     Kidney replaced by transplant     CAD (coronary artery disease)     S/P CABG (coronary artery bypass graft)     Skin cancer     Secondary oxalosis (H)     Hypertension     Aftercare following organ transplant     Immunosuppression (H)     Low iron     Anemia       Social History   Social History     Tobacco Use     Smoking status: Former Smoker     Smokeless tobacco: Never Used   Substance Use Topics     Alcohol use: Yes     Drug use: No       Allergies   Allergies   Allergen Reactions     Gabapentin Swelling       Medications   Current Outpatient Medications   Medication Sig     acetaminophen (TYLENOL) 325 MG tablet Take 325 mg by mouth as needed      ASPIRIN PO Take 81 mg by mouth daily     blood glucose (ONE TOUCH ULTRA) test strip Use to test blood glucose 10 times daily     blood glucose monitoring (ONE TOUCH ULTRASOFT) lancets Dispense item covered by pt ins. E11.9 IDDM type II - Test 4 times/day. Reason: Insulin Pump     bumetanide (BUMEX) 0.5 MG tablet Take 1-2 mg daily as needed for leg swelling     Calcium Acetate, Phos Binder, 667 MG CAPS Take 667 mg by mouth 3 times daily (with meals)     cholestyramine (QUESTRAN) 4 g packet Take 4 g by mouth 3 times daily (with meals)     CREON 19058 units CPEP per EC capsule Take 1 capsule by mouth 3 times daily (with meals)     glucagon (GLUCAGON EMERGENCY) 1 MG injection Inject 1 mg  subcutaneous. As  idrected  Indications: HYPOGLYCEMIC DISORDER     insulin lispro (HUMALOG) 100 UNIT/ML VIAL Inject 100 Units Subcutaneous 3 times daily (before meals)     metoclopramide (REGLAN) 10 MG tablet Take 10 mg by mouth as needed      metoprolol tartrate (LOPRESSOR) 25 MG tablet Take 1 tablet (25 mg) by mouth 2 times daily     mycophenolic acid (GENERIC EQUIVALENT) 180 MG EC tablet Take 1 tablet (180 mg) by mouth 2 times daily     penicillin V (VEETID) 250 mg/5 mL suspension Take 160-800 mg by mouth 3 TAB BEFORE AND 3 TAB AFTER DENTAL PROCEDURE     rosuvastatin (CRESTOR) 5 MG tablet Take 1 tablet (5 mg) by mouth daily     Sodium Polystyrene Sulfonate (KAYEXALATE) POWD Take 30 mls by mouth, Mix with water to equal 30 ml. Only take if Potassium level >5.5     tacrolimus (GENERIC EQUIVALENT) 0.5 MG capsule Take 1 capsule (0.5 mg) by mouth 2 times daily     warfarin (COUMADIN) 2 MG tablet      nitroglycerin (NITROSTAT) 0.4 MG SL tablet Place 0.4 mg under the tongue     Omega-3 Fatty Acids (FISH OIL) 1000 MG CPDR Take  by mouth. One capsule twice daily.  Indications: Arteriosclerotic Heart Disease     No current facility-administered medications for this visit.      Medications Discontinued During This Encounter   Medication Reason     magnesium oxide (MAG-OX) 400 (241.3 Mg) MG tablet      allopurinol (ZYLOPRIM) 100 MG tablet      mycophenolic acid (GENERIC EQUIVALENT) 360 MG EC tablet        Physical Exam   Vital Signs: /70   Pulse 74   Wt 68.9 kg (152 lb)   SpO2 100%   BMI 26.09 kg/m      GENERAL APPEARANCE: alert and no distress  HENT: mouth without ulcers or lesions  LYMPHATICS: no cervical or supraclavicular nodes  RESP: lungs clear to auscultation - no rales, rhonchi or wheezes  CV: regular rhythm, normal rate, no rub, no murmur  EDEMA: LE edema bilaterally  ABDOMEN: soft, nondistended, nontender, bowel sounds normal  MS: extremities normal - no gross deformities noted, no evidence of  inflammation in joints, no muscle tenderness  SKIN: no rash      Data     Renal Latest Ref Rng & Units 5/14/2019 3/5/2019 2/22/2019   Na 133 - 144 mmol/L 141 - -   Na (external) 134 - 144 mmol/L - 139 142   K 3.4 - 5.3 mmol/L 4.7 - -   K (external) 3.5 - 5.2 mmol/L - 5.1 4.0   Cl 94 - 109 mmol/L 111(H) - -   Cl (external) 96 - 106 mmol/L - 103 105   CO2 20 - 32 mmol/L 23 - -   CO2 (external) 20 - 29 mmol/L - 20 17(L)   BUN 7 - 30 mg/dL 52(H) - -   BUN (external) 8 - 27 mg/dL - 74(H) 76(HH)   Cr 0.52 - 1.04 mg/dL 2.53(H) - -   Cr (external) 0.57 - 1.00 mg/dL - 2.69(H) 3.01(H)   Glucose 70 - 99 mg/dL 109(H) - -   Glucose (external) 65 - 99 mg/dL - 170(H) 191(H)   Ca  8.5 - 10.1 mg/dL 8.7 - -   Ca (external) 8.7 - 10.3 mg/dL - 9.3 9.6   Mg 1.6 - 2.3 mg/dL - - -   Mg (external) 1.6 - 2.3 mg/dL - - -     Bone Health Latest Ref Rng & Units 4/10/2018 2/26/2018 11/20/2017   Phos 2.5 - 4.5 mg/dL 4.2 - 3.2   Phos (external) 2.5 - 4.5 mg/dL - 4 . 9(H) -   PTHi 12 - 72 pg/mL - - -   PTHi (external) 15 - 65 Pg/mL - 87(H) -   Vit D Def 20 - 75 ug/L - - -   Vit D Def (external) 30.0 - 100.0 ng/mL - 44.8 -     Heme Latest Ref Rng & Units 2/22/2019 11/13/2018 8/15/2018   WBC 4.0 - 11.0 10e9/L - 5.2 5.3   WBC (external) 3.4 - 10.8 x10E3/uL 7.8(L) - -   Hgb 11.7 - 15.7 g/dL - 10.3(L) 10.0(L)   Hgb (external) 11.1 - 15.9 g/dL 10.9(L) - -   Plt 150 - 450 10e9/L - 196 238   Plt (external) 150 - 379 X10E3/uL 177(L) - -     Liver Latest Ref Rng & Units 5/10/2018 4/10/2018 2/26/2018   AP 40 - 150 U/L 75 - -   AP (external) 39 - 117 IU/L - - 72   TBili 0.2 - 1.3 mg/dL 0.4 - -   TBili (external) 0 0-1.2 mg/dL - - 0.4   DBili 0.0 - 0.2 mg/dL 0.1 - -   DBili (external) 0.1 - 0.5 mg/dL - - -   ALT 0 - 50 U/L 32 - -   ALT (external) 0 - 32 IU/L - - 14   AST 0 - 45 U/L 20 - -   AST (external) 0 - 40 IU/L - - 21   Tot Protein 6.8 - 8.8 g/dL 6.7(L) - -   Tot Protein (external) 6.0 - 8.5 g/dL - - 6.5   Albumin 3.4 - 5.0 g/dL 3.4 3.8 -   Albumin  (external) 3.5 - 4.8 g/dL - - 4.4     Pancreas Latest Ref Rng & Units 1/11/2017 4/8/2016 4/20/2015   A1C (external) <=6.4 % 6.6(H) 6.5(H) 7.2(H)     Iron studies Latest Ref Rng & Units 9/14/2018 1/20/2017   Iron 35 - 180 ug/dL 29(L) 46   Iron sat 15 - 46 % 10(L) 12(L)   Ferritin 8 - 252 ng/mL 102 36     [facility] Txp Virology Latest Ref Rng & Units 2/26/2018 9/18/2017 9/16/2016   CVM DNA Quant - - Plasma, EDTA anticoagulant Plasma   CMV DNA Quant Ext NEGATIVE NEGATIVE - -   BK Spec - - - Plasma   BK Res BKNEG copies/mL - - BK Virus DNA Not Detected   BK Log <2.7 Log copies/mL - - Not Calculated   The Real-Time quantitative BK Virus assay was developed and its performance   characteristics determined by the Infectious Diseases Diagnostic Laboratory at   the [facility] in [place], [place]. The   primers and probes for each analyte are Analyte Specific Reagents (ASRs)   manufactured by [employer].   ASRs are used in many laboratory tests necessary for standard medical care and   generally do not require U.S. Food and Drug Administration approval. The FDA   has determined that such clearance or approval is not necessary.   This test is used for clinical purposes. It should not be regarded as   investigational or for research. This laboratory is certified under the   Clinical Laboratory Improvement Amendments of 1988 (CLIA-88) as qualified to   perform high complexity clinical laboratory testing.          Recent Labs   Lab Test 07/11/18  0916 08/15/18  0907 11/13/18  0903   DOSTAC 2100 on 07/10/2018 9:00 PM ON 8/14/18 9:00PM ON 11/12/18   TACROL 4.8* 3.6* 4.2*     Recent Labs   Lab Test 11/20/17  0925 05/10/18  0923 06/11/18  0911   DOSMPA 2115 ON 11/19/2017 2,100 TAKEN 2100 ON 06102018   MPACID 2.24 1.31 0.71*   MPAG 132.0* 164.8* 105.5*     Scribe Disclosure:   I, Emelia Wyman, am serving as a scribe to document services personally performed by Kidney/Pancreas Recipient at this visit, based  upon the provider's statements to me. All documentation has been reviewed by the aforementioned provider prior to being entered into the official medical record.

## 2019-05-21 NOTE — NURSING NOTE
Chief Complaint   Patient presents with     RECHECK     follow up for Kidney tx     Blood pressure 128/70, pulse 74, weight 68.9 kg (152 lb), SpO2 100 %.    Darlin Redding, CMA

## 2019-05-21 NOTE — NURSING NOTE
Dixie Cunha was seen today in clinic by this writer. Medications, lab orders, lab frequency, and necessary follow up discussed with patient. Patient was provided with a copy of the current lab letter. Patient voiced understanding and agreement of education and plan.

## 2019-06-26 DIAGNOSIS — Z94.0 KIDNEY TRANSPLANTED: ICD-10-CM

## 2019-06-26 DIAGNOSIS — T86.10 COMPLICATIONS, KIDNEY TRANSPLANT: ICD-10-CM

## 2019-06-26 RX ORDER — CALCIUM ACETATE 667 MG/1
667 CAPSULE ORAL
Qty: 270 CAPSULE | Refills: 3 | Status: SHIPPED | OUTPATIENT
Start: 2019-06-26

## 2019-06-26 NOTE — TELEPHONE ENCOUNTER
Last Office Visit with Nephrologist:  5/21/19.  Medication refilled per Nephrology Clinic protocol.     Debbie Olsen RN

## 2019-07-17 ENCOUNTER — TELEPHONE (OUTPATIENT)
Dept: TRANSPLANT | Facility: CLINIC | Age: 74
End: 2019-07-17

## 2019-07-17 DIAGNOSIS — Z48.298 AFTERCARE FOLLOWING ORGAN TRANSPLANT: ICD-10-CM

## 2019-07-17 DIAGNOSIS — T86.10 COMPLICATIONS, KIDNEY TRANSPLANT: ICD-10-CM

## 2019-07-17 DIAGNOSIS — Z94.0 KIDNEY TRANSPLANTED: Primary | ICD-10-CM

## 2019-07-18 ENCOUNTER — TELEPHONE (OUTPATIENT)
Dept: NEPHROLOGY | Facility: CLINIC | Age: 74
End: 2019-07-18

## 2019-07-18 RX ORDER — MYCOPHENOLIC ACID 360 MG/1
360 TABLET, DELAYED RELEASE ORAL 2 TIMES DAILY
Qty: 60 TABLET | Refills: 11 | Status: SHIPPED | OUTPATIENT
Start: 2019-07-18

## 2019-07-18 NOTE — TELEPHONE ENCOUNTER
[facility] Call Center    Phone Message    May a detailed message be left on voicemail: yes    Reason for Call: Other: Nurse  from [facility] called, she would like the creatin lab the most recent. Please fax to [phone]     Action Taken: Message routed to:  [facility] ([facility] urology

## 2019-07-18 NOTE — TELEPHONE ENCOUNTER
Called back Dixie Cunha and left detailed medication asking which medication.    OUTCOME:  Myfortic dose was originally 360 mg BID but was decreased to 180 mg BID d/t GI symptoms.  Symptoms did not improve with the dose change. Will increase dose back to 360 mg BID.    Requesting prescription sent to:  [id number] [place] [id number] [phone] (Phone)  [phone] (Fax)     Also asking if she needs to continue taking calcium acetate.  Last phosphorus level 4.2 from 4/10/18. Will add Phosphorus level to next lab draw.  Wants to know if she can be off her iron supplement. Not listed in Epic  Who is prescribing Iron? States she remember it was Dr. Navarrete.  Hemoglobin currently 10.4 (4/11/19) Iron panel from Sept last year.

## 2019-08-15 DIAGNOSIS — Z48.298 AFTERCARE FOLLOWING ORGAN TRANSPLANT: ICD-10-CM

## 2019-08-15 DIAGNOSIS — T86.10 COMPLICATIONS, KIDNEY TRANSPLANT: ICD-10-CM

## 2019-08-15 DIAGNOSIS — Z94.0 KIDNEY TRANSPLANTED: ICD-10-CM

## 2019-08-15 LAB
CREAT UR-MCNC: 27 MG/DL
ERYTHROCYTE [DISTWIDTH] IN BLOOD BY AUTOMATED COUNT: 15.8 % (ref 10–15)
HCT VFR BLD AUTO: 34.6 % (ref 35–47)
HGB BLD-MCNC: 10.6 G/DL (ref 11.7–15.7)
MCH RBC QN AUTO: 29.2 PG (ref 26.5–33)
MCHC RBC AUTO-ENTMCNC: 30.6 G/DL (ref 31.5–36.5)
MCV RBC AUTO: 95 FL (ref 78–100)
PLATELET # BLD AUTO: 157 10E9/L (ref 150–450)
PROT UR-MCNC: 0.11 G/L
PROT/CREAT 24H UR: 0.41 G/G CR (ref 0–0.2)
RBC # BLD AUTO: 3.63 10E12/L (ref 3.8–5.2)
WBC # BLD AUTO: 8 10E9/L (ref 4–11)

## 2019-08-15 PROCEDURE — 80048 BASIC METABOLIC PNL TOTAL CA: CPT | Performed by: INTERNAL MEDICINE

## 2019-08-15 PROCEDURE — 84156 ASSAY OF PROTEIN URINE: CPT | Performed by: INTERNAL MEDICINE

## 2019-08-15 PROCEDURE — 80197 ASSAY OF TACROLIMUS: CPT | Performed by: INTERNAL MEDICINE

## 2019-08-15 PROCEDURE — 85027 COMPLETE CBC AUTOMATED: CPT | Performed by: INTERNAL MEDICINE

## 2019-08-15 PROCEDURE — 36415 COLL VENOUS BLD VENIPUNCTURE: CPT | Performed by: INTERNAL MEDICINE

## 2019-08-15 PROCEDURE — 84100 ASSAY OF PHOSPHORUS: CPT | Performed by: INTERNAL MEDICINE

## 2019-08-16 ENCOUNTER — MYC MEDICAL ADVICE (OUTPATIENT)
Dept: OTHER | Age: 74
End: 2019-08-16

## 2019-08-16 ENCOUNTER — TELEPHONE (OUTPATIENT)
Dept: TRANSPLANT | Facility: CLINIC | Age: 74
End: 2019-08-16

## 2019-08-16 LAB
ANION GAP SERPL CALCULATED.3IONS-SCNC: 13 MMOL/L (ref 3–14)
BUN SERPL-MCNC: 64 MG/DL (ref 7–30)
CALCIUM SERPL-MCNC: 7 MG/DL (ref 8.5–10.1)
CHLORIDE SERPL-SCNC: 113 MMOL/L (ref 94–109)
CO2 SERPL-SCNC: 17 MMOL/L (ref 20–32)
CREAT SERPL-MCNC: 2.33 MG/DL (ref 0.52–1.04)
GFR SERPL CREATININE-BSD FRML MDRD: 20 ML/MIN/{1.73_M2}
GLUCOSE SERPL-MCNC: 49 MG/DL (ref 70–99)
PHOSPHATE SERPL-MCNC: 4.6 MG/DL (ref 2.5–4.5)
POTASSIUM SERPL-SCNC: 3.5 MMOL/L (ref 3.4–5.3)
SODIUM SERPL-SCNC: 143 MMOL/L (ref 133–144)
TACROLIMUS BLD-MCNC: 6.5 UG/L (ref 5–15)
TME LAST DOSE: NORMAL H

## 2019-08-16 NOTE — TELEPHONE ENCOUNTER
DATE:  8/16/2019   TIME OF RECEIPT FROM LAB:  0913  LAB TEST:  Glucose  LAB VALUE:  49 yesterday  RESULTS GIVEN WITH READ-BACK TO (PROVIDER):  CARLOS RICHARDSON LPN  TIME LAB VALUE REPORTED TO PROVIDER:   [id number] Aretha Ramsey RN

## 2019-08-16 NOTE — LETTER
STANDING LABORATORY ORDER      DATE & TIME ISSUED:     August 16, 2019 at 3:15 PM  PATIENT NAME:      Dixie Cunha   DATE OF BIRTH / [facility] MRN:    November 10, 1945 / [id number]     DIAGNOSIS / ICD-10 CODES:   1. Kidney transplant recipient / Z94.0        2. Long-term immunosuppression / Z79.899        3. Hyperlipidemia / E78.5   EVERY 3 MONTHS      - CBC with platelets      - Basic metabolic panel      - Tacrolimus drug level (12 hours after last dose)   EVERY 6 MONTHS     - Random urine protein-creatinine ratio     - Hepatic panel     - Fasting lipid profile    PLEASE FAX LAB RESULTS TO TRANSPLANT OFFICE: [phone].           Jean Pierre Navarrete MD

## 2019-08-16 NOTE — TELEPHONE ENCOUNTER
Pt returned call. States she checks her blood sugars multiple times a day, is very on top of diabetic management. Does note she has a new insulin pump so will continue to keep a close eye on her blood sugars.

## 2019-08-16 NOTE — TELEPHONE ENCOUNTER
Call placed to patient to check in regarding low blood sugar recorded yesterday, ensure she has is eating ok and blood sugars is controlled. LVM to return call to transplant office.

## 2019-08-22 ENCOUNTER — TELEPHONE (OUTPATIENT)
Dept: TRANSPLANT | Facility: CLINIC | Age: 74
End: 2019-08-22

## 2019-08-22 DIAGNOSIS — Z94.0 KIDNEY TRANSPLANTED: Primary | ICD-10-CM

## 2019-08-22 NOTE — TELEPHONE ENCOUNTER
ISSUE:  CO2 17    PLAN:   Kareem White MD Etcheverry, Katherine, RN             Stable kidney function, but low bicarbonate level, as well as low glucose.  Would check to see if patient is having any diarrhea or other symptoms.  Would consider starting sodium bicarbonate supplement.      OUTCOME:   Call placed to pt.  RNCC spoke with pt  Peter.  He reports pt is currently in the hospital at [facility] d/t severe leg cramping and elevated BS.  Peter reports he took her in this morning.  They found pt magnesium level was low <1 and she has been getting mag supplements in hospital.  He reports pt is expected to be discharged tomorrow as long as mag and BS remain stable.  He reports pt has been having some diarrhea the past couple of weeks, but since resolving.  He is aware pt to get full set of transplant labs next week once out of the hospital.     Questions answered, Peter v/u.

## 2019-09-27 ENCOUNTER — TRANSFERRED RECORDS (OUTPATIENT)
Dept: HEALTH INFORMATION MANAGEMENT | Facility: CLINIC | Age: 74
End: 2019-09-27

## 2019-10-02 ENCOUNTER — TELEPHONE (OUTPATIENT)
Dept: TRANSPLANT | Facility: CLINIC | Age: 74
End: 2019-10-02

## 2019-10-02 NOTE — TELEPHONE ENCOUNTER
Issue:  Several labs abnormal from labs on 9/27.    Plan:  Dixie Cunha confirms labs were ordered by new nephrologist she is seeing, Dr. Keyona Hugo.  We discussed labs, she states she will discuss with Dr. Hugo. UA with some bacteria, Dixie denies any s/s of UTI.  Bicarb and creatinine are stable.

## 2019-10-04 ENCOUNTER — HEALTH MAINTENANCE LETTER (OUTPATIENT)
Age: 74
End: 2019-10-04

## 2019-10-16 DIAGNOSIS — R60.9 EDEMA, UNSPECIFIED TYPE: ICD-10-CM

## 2019-10-16 RX ORDER — BUMETANIDE 0.5 MG/1
TABLET ORAL
Qty: 90 TABLET | Refills: 3 | Status: SHIPPED | OUTPATIENT
Start: 2019-10-16

## 2019-10-23 ENCOUNTER — TELEPHONE (OUTPATIENT)
Dept: TRANSPLANT | Facility: CLINIC | Age: 74
End: 2019-10-23

## 2019-10-23 NOTE — LETTER
OUTPATIENT LABORATORY TEST ORDER    Patient Name: Dixie Cunha               Transplant Date: 8/26/2003   YOB: 1945                              Issue Date & Time:10/23/2019  2:22 PM   [facility] MR: [id number]                               Exp. Date (1 year after date issued)    Diagnoses: Aftercare of Organ Transplant (ICD-10 Z48.288)   Kidney Transplant (ICD-10 Z94.0)   Long term use of medications (ICD-10 Z79.899)     Lab results to be available on the same day drawn.   Patient should release information to the [facility], [facility].  Please fax to the Transplant Center at [phone].    Every 3 months:   ?CBC and Platelet   ?Basic Metabolic Panel          ?12 hour Mycophenolic acid  level         ?12 hour tacrolimus drug level    Every 6 months:          ?Random Urine for protein/creatinine ratio          ?Liver Function Tests           ?Fasting Lipid Panel      If you have any questions, please call [facility] at [phone] .    Please fax labs to [phone]  .

## 2019-10-23 NOTE — TELEPHONE ENCOUNTER
Is leaving for [place] 12/07/2019  Will need updated lab letter (2 copies mailed to her [place] address).

## 2019-10-25 ENCOUNTER — TRANSFERRED RECORDS (OUTPATIENT)
Dept: HEALTH INFORMATION MANAGEMENT | Facility: CLINIC | Age: 74
End: 2019-10-25

## 2019-12-02 ENCOUNTER — TELEPHONE (OUTPATIENT)
Dept: TRANSPLANT | Facility: CLINIC | Age: 74
End: 2019-12-02

## 2019-12-02 NOTE — TELEPHONE ENCOUNTER
Issue:  Cr above baseline at 3.1  Follows with Dr. Hugo    Plan:  Call placed to check in with Dixie.   Dixie TUNDE Cunha denies any recent dehydration or change in diuretics. She will discuss elevated Cr level with Dr. Hugo in clinic this week.  She will leave for [place] this week and has standing lab orders.

## 2019-12-16 ENCOUNTER — TELEPHONE (OUTPATIENT)
Dept: TRANSPLANT | Facility: CLINIC | Age: 74
End: 2019-12-16

## 2019-12-16 NOTE — TELEPHONE ENCOUNTER
Prior Authorization Specialty Medication Request    Medication/Dose:   mycophenolic acid (GENERIC EQUIVALENT) 360 MG EC tablet Take 1 tablet (360 mg) by mouth 2 times daily     ICD code (if different than what is on RX):  Z94.0  Previously Tried and Failed:      Important Lab Values:   Rationale:     Insurance Name:   Insurance ID:   Insurance Phone Number:     Pharmacy Information (if different than what is on RX)  [facility]  [facility]  Phone:  [phone]

## 2019-12-17 NOTE — TELEPHONE ENCOUNTER
Prior Authorization Not Needed per Insurance    Medication: MYCOPHENOLIC 360MG - NO PA NEEDED/PART B COVERED  Insurance Company:  [facility] PART B  Expected CoPay:    $66.04  Ph[facility] Filling the Rx[facility]  [facility]  Pharmacy Notified:  YES  Patient Notified:      Prior authorization is not required due to coverage is available under [facility] Medicare Part-B benefits.  TEST CLAIM = $66.04  Spoke to [facility] Oswald and gave the [facility] Part B info below, Rep Oswald stated they would have their resolution team work on this.    ID#[id number]  GRP#[id number]  BIN#[id number]  PCN#[id number]

## 2020-02-03 DIAGNOSIS — I12.9 RENAL HYPERTENSION, STAGE 1-4 OR UNSPECIFIED CHRONIC KIDNEY DISEASE: ICD-10-CM

## 2020-02-03 RX ORDER — METOPROLOL TARTRATE 25 MG/1
25 TABLET, FILM COATED ORAL 2 TIMES DAILY
Qty: 180 TABLET | Refills: 3 | Status: SHIPPED | OUTPATIENT
Start: 2020-02-03 | End: 2021-01-11

## 2020-02-07 DIAGNOSIS — I12.9 RENAL HYPERTENSION, STAGE 1-4 OR UNSPECIFIED CHRONIC KIDNEY DISEASE: ICD-10-CM

## 2020-02-07 RX ORDER — METOPROLOL TARTRATE 25 MG/1
25 TABLET, FILM COATED ORAL 2 TIMES DAILY
Qty: 180 TABLET | Refills: 2 | OUTPATIENT
Start: 2020-02-07

## 2020-02-08 ENCOUNTER — HEALTH MAINTENANCE LETTER (OUTPATIENT)
Age: 75
End: 2020-02-08

## 2020-04-17 ENCOUNTER — MEDICAL CORRESPONDENCE (OUTPATIENT)
Dept: HEALTH INFORMATION MANAGEMENT | Facility: CLINIC | Age: 75
End: 2020-04-17

## 2020-04-21 ENCOUNTER — TRANSFERRED RECORDS (OUTPATIENT)
Dept: HEALTH INFORMATION MANAGEMENT | Facility: CLINIC | Age: 75
End: 2020-04-21

## 2020-05-12 ENCOUNTER — TRANSFERRED RECORDS (OUTPATIENT)
Dept: HEALTH INFORMATION MANAGEMENT | Facility: CLINIC | Age: 75
End: 2020-05-12

## 2020-07-22 ENCOUNTER — MEDICAL CORRESPONDENCE (OUTPATIENT)
Dept: HEALTH INFORMATION MANAGEMENT | Facility: CLINIC | Age: 75
End: 2020-07-22

## 2020-07-22 ENCOUNTER — TRANSFERRED RECORDS (OUTPATIENT)
Dept: HEALTH INFORMATION MANAGEMENT | Facility: CLINIC | Age: 75
End: 2020-07-22

## 2020-10-28 ENCOUNTER — TELEPHONE (OUTPATIENT)
Dept: TRANSPLANT | Facility: CLINIC | Age: 75
End: 2020-10-28

## 2020-10-28 ENCOUNTER — TRANSFERRED RECORDS (OUTPATIENT)
Dept: HEALTH INFORMATION MANAGEMENT | Facility: CLINIC | Age: 75
End: 2020-10-28

## 2020-10-28 NOTE — TELEPHONE ENCOUNTER
ISSUE:   Tacrolimus IR level 2.2 on 10/22/20 0800, goal 3-5, dose 0.5 mg BID.    PLAN:   Please call [facility], Dr. Keyona Hugo's nurse and give the following recommendations:  - Confirm this was an accurate 12-hour trough.   - Verify Tacrolimus IR dose 0.5 mg BID.   - Confirm no new medications or illness. Confirm no missed doses.   -If accurate trough and accurate dose, increase Tacrolimus IR dose to 1 mg BID and repeat labs in 1-2 weeks. Dr. Hugo will need to place order for repeat and update script as we have not prescribed this medication or seen patient at the  since May 2019.    OUTCOME:   RNCC reached out to pt's local nephrologist office in regards to tacrolimus level. Voicemail message left for ALBERT Bailey.

## 2020-11-03 ENCOUNTER — TELEPHONE (OUTPATIENT)
Dept: TRANSPLANT | Facility: CLINIC | Age: 75
End: 2020-11-03

## 2020-11-03 NOTE — TELEPHONE ENCOUNTER
Patient Call: Battlefyil  Date/Time: 11/3/20 202pm  Reason for call: Patient left message returning our call. She states she did see Dr Hugo and she increased her dose, she is now taking 0.5mg twice daily (she did not state what med). Please call her back with questions.

## 2020-11-03 NOTE — TELEPHONE ENCOUNTER
ISSUE: RNCC has not heard back from Dr. Hugo's nurse, Lynn, call placed 10/28/20 regarding low tac level.    PLAN: Call placed to Dixie to inquire whether she had increased her dose after her visit with Dr. Hugo.     OUTCOME: Chart reviewed. RNCC received progress notes from visit with Dr. Hugo, patient's local nephrologist, last Wednesday, 10/28/20. Patient has been taking per notes 0.5 mg every AM and 0.5 mg every other PM. This would account for the inaccurate level or low tacrolimus level. Dr. Hugo looks to have recommended increase of tacrolimus level to 0.5 mg BID and repeat early November (winters in [place]). (See page 6 of outside records/clinic note from [facility]).

## 2020-11-04 ENCOUNTER — TRANSFERRED RECORDS (OUTPATIENT)
Dept: HEALTH INFORMATION MANAGEMENT | Facility: CLINIC | Age: 75
End: 2020-11-04

## 2020-11-04 LAB
GLUCOSE (EXTERNAL): 160 MG/DL (ref 65–100)
HBA1C MFR BLD: 7.3 %

## 2020-11-08 ENCOUNTER — HEALTH MAINTENANCE LETTER (OUTPATIENT)
Age: 75
End: 2020-11-08

## 2021-01-01 ENCOUNTER — TRANSFERRED RECORDS (OUTPATIENT)
Dept: HEALTH INFORMATION MANAGEMENT | Facility: CLINIC | Age: 76
End: 2021-01-01

## 2021-01-01 ENCOUNTER — HEALTH MAINTENANCE LETTER (OUTPATIENT)
Age: 76
End: 2021-01-01

## 2021-01-01 PROCEDURE — 87070 CULTURE OTHR SPECIMN AEROBIC: CPT | Mod: ORL | Performed by: PODIATRIST

## 2021-01-01 PROCEDURE — 87075 CULTR BACTERIA EXCEPT BLOOD: CPT | Mod: ORL | Performed by: PODIATRIST

## 2021-01-08 DIAGNOSIS — I12.9 RENAL HYPERTENSION, STAGE 1-4 OR UNSPECIFIED CHRONIC KIDNEY DISEASE: ICD-10-CM

## 2021-01-11 RX ORDER — METOPROLOL TARTRATE 25 MG/1
TABLET, FILM COATED ORAL
Qty: 180 TABLET | Refills: 3 | Status: SHIPPED | OUTPATIENT
Start: 2021-01-11

## 2021-03-28 ENCOUNTER — HEALTH MAINTENANCE LETTER (OUTPATIENT)
Age: 76
End: 2021-03-28

## 2021-04-13 ENCOUNTER — TRANSFERRED RECORDS (OUTPATIENT)
Dept: HEALTH INFORMATION MANAGEMENT | Facility: CLINIC | Age: 76
End: 2021-04-13

## 2021-04-13 LAB
GLUCOSE (EXTERNAL): 135 MG/DL (ref 65–140)
HBA1C MFR BLD: 7.7 %

## 2021-05-22 ENCOUNTER — HEALTH MAINTENANCE LETTER (OUTPATIENT)
Age: 76
End: 2021-05-22

## 2022-01-01 ENCOUNTER — HEALTH MAINTENANCE LETTER (OUTPATIENT)
Age: 77
End: 2022-01-01

## 2022-01-01 ENCOUNTER — TRANSFERRED RECORDS (OUTPATIENT)
Dept: HEALTH INFORMATION MANAGEMENT | Facility: CLINIC | Age: 77
End: 2022-01-01

## 2022-09-09 ENCOUNTER — POST MORTEM DOCUMENTATION (OUTPATIENT)
Dept: TRANSPLANT | Facility: CLINIC | Age: 77
End: 2022-09-09

## 2022-09-09 NOTE — PROGRESS NOTES
Received notification on 2022 of patient's death from , Peter, contact information is in Epic.  Graft status at the time of death was reported as Unknown.  Additional information: Obituary reviewed online.  TIS verification is: Pending  The Transplant Office has been notified that patient is . The Post Mortem Encounter has been completed. Notifications have been sent to the Care team.   Instructions have been sent to cancel pending appointments, discontinue pending orders and send a sympathy card to the family.  MANDEEP PEREZ    Received notification of patient's death from Marlen Herrera RN.  Place of death was reported as home on hospice..  Graft status at the time of death was reported as Functioning.  Additional information: Patient no longer wished for aggressive treatment of Stage V chronic kidney disease and opted for comfort focused care. Entered hospice care 22 and passed 2022.  TIS verification is: Complete

## 2022-09-09 NOTE — Clinical Note
Admin team - please send sympathy card, do not close encounter. Thanks!  Shanta Herrera, RN, BSN, [facility] Patient Care Supervisor, Post Abdominal Transplant

## 2022-09-12 ENCOUNTER — DOCUMENTATION ONLY (OUTPATIENT)
Dept: TRANSPLANT | Facility: CLINIC | Age: 77
End: 2022-09-12

## 2025-02-17 NOTE — LETTER
5/21/2018      RE: Dixie Cunha  [address]  [facility] [address]       Assessment and Plan:  1. LDKT - 2003, new baseline 2-2.5 mg/dL which remains stable to slightly up   2. Immunosuppression:  MPA and prograf goal 4-6 ug/L, well tolerated   3. HTN: acceptable BP,  Now euvolemic on bumex 1 mg daily   4. CAD: LHC was done and showed obstructive CAD only amenable to medical intervention, largely asymptomatic but recent noted 5 min chest tightness that resolved spontaneously. Will refer back to cardiology   5. DM: well controlled, Hg A1c in 6.6 range, following with endocrinology  6. Skin cancer: no recurrence follows with dermatology  7. Dyslipidemia: continues with statin will switch to crestor 5 mg Will continue with monthly labs    8. CKD 4 but stable   9. Anemia of CKD stable HGB   10. MICHELA: PTH is appropriate will need repeat and her vitamin D was replete last checked   11. Anemia of CKD will refer to anemia clinic    12. Low BK grade viremia no need to check further as it has been stable for several months   13. Diarrhea resolved   14. Persistent sinusitis currently on Biaxin, reduced tacrolimus and will recheck levels   15. Volume overload, likely related to elevated tacrolimus vs others. Sliding scale diuretics (SSD) based on weight   Assessment and plan was discussed with patient and she voiced her understanding and agreement.      Reason for Visit:  Ms. Cunha is here for kidney transplant follow up.     HPI:   Dixie Cunha is a 72 year old year old female with PMH significant for DM (well controled), complicated by CAD s/p GABG 2003, that failed ( as per patient her vein were to small), now with PHT, RHF, diabetic nephropathy s/p LDKT in 2003 from her sister, with baseline Cr was 1.4-1.5, until 8/12/16. She was found with a Cr of 3.5 during her routine f/u with transplant nephrology. Her immunosuppression had been Rapamune and Tacrolimus. She had a kidney Bx showing oxalate  Lab Results   Component Value Date    EGFR 61 02/16/2025    EGFR 53 02/15/2025    EGFR 55 02/15/2025    CREATININE 1.08 02/16/2025    CREATININE 1.21 02/15/2025    CREATININE 1.18 02/15/2025     Baseline Cr 1.0-1.1  Trend renal indices  Monitor UOP  Avoid nephrotoxins   nephropathy, most likely secondary to infectious diarrhea which resolved. Her immunosuppression was switched to Tac/MPA with good tolerance.    Since she was seen last, she has been feeling really well except for sinusitis. She is on her second course of abx. Her tacrolimus was elevated on last check. She is currently on Biaxin. She is improving.          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    Active Medical Problems:  Patient Active Problem List   Diagnosis     Diabetes (H)     Kidney replaced by transplant     CAD (coronary artery disease)     S/P CABG (coronary artery bypass graft)     Skin cancer     Secondary oxalosis (H)     Hypertension     Aftercare following organ transplant     Immunosuppression (H)       Personal Hx:  Social History     Social History     Marital status:      Spouse name: N/A     Number of children: N/A     Years of education: N/A     Occupational History     Not on file.     Social History Main Topics     Smoking status: Former Smoker     Smokeless tobacco: Not on file     Alcohol use Yes     Drug use: No     Sexual activity: Not on file     Other Topics Concern     Not on file     Social History Narrative       Allergies:  Allergies   Allergen Reactions     Gabapentin Swelling       Medications:    Current Outpatient Prescriptions on File Prior to Visit:  acetaminophen (TYLENOL) 325 MG tablet Take 325 mg by mouth as needed    ASPIRIN PO Take 81 mg by mouth daily   blood glucose (ONE TOUCH ULTRA) test strip Use to test blood glucose 10 times daily   blood glucose monitoring (ONE TOUCH ULTRASOFT) lancets Dispense item covered by pt ins. E11.9 IDDM type II - Test 4  times/day. Reason: Insulin Pump   bumetanide (BUMEX) 1 MG tablet Take 1 tablet (1 mg) by mouth daily   Calcium Acetate, Phos Binder, 667 MG CAPS Take 667 mg by mouth 3 times daily (with meals)   glucagon (GLUCAGON EMERGENCY) 1 MG injection Inject 1 mg subcutaneous. As  idrected  Indications: HYPOGLYCEMIC DISORDER   insulin lispro (HUMALOG) 100 UNIT/ML VIAL Inject 100 Units Subcutaneous 3 times daily (before meals)   magnesium oxide (MAG-OX) 400 (241.3 MG) MG tablet Take 1 tablet (400 mg) by mouth daily   metoclopramide (REGLAN) 10 MG tablet Take 10 mg by mouth as needed    metoprolol (LOPRESSOR) 25 MG tablet Take 1 tablet (25 mg) by mouth 2 times daily   mycophenolic acid (GENERIC EQUIVALENT) 360 MG EC tablet Take 1 tablet (360 mg) by mouth 2 times daily   nitroglycerin (NITROSTAT) 0.4 MG SL tablet Place 0.4 mg under the tongue   Omega-3 Fatty Acids (FISH OIL) 1000 MG CPDR Take  by mouth. One capsule twice daily.  Indications: Arteriosclerotic Heart Disease   omeprazole (PRILOSEC) 20 MG capsule Take 20 mg by mouth   penicillin V (VEETID) 250 mg/5 mL suspension Take 160-800 mg by mouth 3 TAB BEFORE AND 3 TAB AFTER DENTAL PROCEDURE   rosuvastatin (CRESTOR) 5 MG tablet Take 1 tablet (5 mg) by mouth daily   Sodium Polystyrene Sulfonate (KAYEXALATE) POWD Take 30 mls by mouth, Mix with water to equal 30 ml. Only take if Potassium level >5.5 (Patient not taking: Reported on 11/27/2017)   sulfamethoxazole-trimethoprim (BACTRIM) 400-80 MG per tablet Take 1 tablet by mouth Every Mon, Wed, Fri Morning   tacrolimus (GENERIC EQUIVALENT) 0.5 MG capsule Take 1 capsule (0.5 mg) by mouth 2 times daily     No current facility-administered medications on file prior to visit.     Vitals:  /72 (BP Location: Right arm)  Pulse 84  Temp 97.9  F (36.6  C) (Oral)  Wt 69.7 kg (153 lb 9.6 oz)  SpO2 98%  BMI 26.37 kg/m2    Exam:   HENT: mouth without ulcers or lesions  LYMPHATICS: no cervical or supraclavicular nodes  RESP: lungs  clear to auscultation - no rales, rhonchi or wheezes  CV: regular rhythm, normal rate, no rub, no murmur  EDEMA: no LE edema bilateral  ABDOMEN: soft, nondistended, nontender, bowel sounds normal  MS: extremities normal - no gross deformities noted, no evidence of inflammation in joints, no muscle tenderness  SKIN: no rash    Results:   Reviewed     Jean Pierre Navarrete MD